# Patient Record
Sex: MALE | Race: WHITE | NOT HISPANIC OR LATINO
[De-identification: names, ages, dates, MRNs, and addresses within clinical notes are randomized per-mention and may not be internally consistent; named-entity substitution may affect disease eponyms.]

---

## 2004-10-01 RX ADMIN — Medication 300 MILLIGRAM(S): at 17:07

## 2017-08-31 ENCOUNTER — APPOINTMENT (OUTPATIENT)
Dept: INTERNAL MEDICINE | Facility: CLINIC | Age: 62
End: 2017-08-31
Payer: COMMERCIAL

## 2017-08-31 VITALS
HEART RATE: 78 BPM | RESPIRATION RATE: 15 BRPM | DIASTOLIC BLOOD PRESSURE: 78 MMHG | HEIGHT: 69 IN | SYSTOLIC BLOOD PRESSURE: 138 MMHG | WEIGHT: 209 LBS | BODY MASS INDEX: 30.96 KG/M2

## 2017-08-31 VITALS — TEMPERATURE: 98.1 F

## 2017-08-31 PROCEDURE — 99215 OFFICE O/P EST HI 40 MIN: CPT

## 2017-10-04 ENCOUNTER — APPOINTMENT (OUTPATIENT)
Dept: CARDIOLOGY | Facility: CLINIC | Age: 62
End: 2017-10-04

## 2017-10-30 ENCOUNTER — APPOINTMENT (OUTPATIENT)
Dept: CARDIOLOGY | Facility: CLINIC | Age: 62
End: 2017-10-30

## 2017-11-08 ENCOUNTER — APPOINTMENT (OUTPATIENT)
Dept: INTERNAL MEDICINE | Facility: CLINIC | Age: 62
End: 2017-11-08
Payer: COMMERCIAL

## 2017-11-08 VITALS
HEART RATE: 80 BPM | HEIGHT: 69 IN | SYSTOLIC BLOOD PRESSURE: 134 MMHG | RESPIRATION RATE: 14 BRPM | DIASTOLIC BLOOD PRESSURE: 74 MMHG | WEIGHT: 220 LBS | BODY MASS INDEX: 32.58 KG/M2

## 2017-11-08 PROCEDURE — 99215 OFFICE O/P EST HI 40 MIN: CPT

## 2017-11-09 ENCOUNTER — LABORATORY RESULT (OUTPATIENT)
Age: 62
End: 2017-11-09

## 2017-11-10 ENCOUNTER — LABORATORY RESULT (OUTPATIENT)
Age: 62
End: 2017-11-10

## 2017-11-13 ENCOUNTER — APPOINTMENT (OUTPATIENT)
Dept: SURGERY | Facility: CLINIC | Age: 62
End: 2017-11-13
Payer: COMMERCIAL

## 2017-11-13 VITALS — WEIGHT: 220 LBS | BODY MASS INDEX: 32.58 KG/M2 | HEIGHT: 69 IN

## 2017-11-13 LAB
25(OH)D3 SERPL-MCNC: 16.1 NG/ML
ALBUMIN SERPL ELPH-MCNC: 4.5 G/DL
ALP BLD-CCNC: 78 U/L
ALT SERPL-CCNC: 48 U/L
ANION GAP SERPL CALC-SCNC: 13 MMOL/L
APPEARANCE: CLEAR
AST SERPL-CCNC: 29 U/L
BASOPHILS # BLD AUTO: 0.02 K/UL
BASOPHILS NFR BLD AUTO: 0.3 %
BILIRUB SERPL-MCNC: 3.9 MG/DL
BILIRUBIN URINE: NEGATIVE
BLOOD URINE: NEGATIVE
BUN SERPL-MCNC: 17 MG/DL
CALCIUM SERPL-MCNC: 9.6 MG/DL
CHLORIDE SERPL-SCNC: 102 MMOL/L
CHOLEST SERPL-MCNC: 210 MG/DL
CHOLEST/HDLC SERPL: 4.7 RATIO
CO2 SERPL-SCNC: 28 MMOL/L
COLOR: YELLOW
CREAT SERPL-MCNC: 1.17 MG/DL
CRP SERPL HS-MCNC: 0.5 MG/L
EOSINOPHIL # BLD AUTO: 0.26 K/UL
EOSINOPHIL NFR BLD AUTO: 3.4 %
GLUCOSE BS SERPL-MCNC: 86 MG/DL
GLUCOSE QUALITATIVE U: 100 MG/DL
GLUCOSE SERPL-MCNC: 98 MG/DL
HBA1C MFR BLD HPLC: 4.8 %
HCT VFR BLD CALC: 46.4 %
HDLC SERPL-MCNC: 45 MG/DL
HGB BLD-MCNC: 16.6 G/DL
IMM GRANULOCYTES NFR BLD AUTO: 0.1 %
KETONES URINE: NEGATIVE
LDLC SERPL CALC-MCNC: 145 MG/DL
LEUKOCYTE ESTERASE URINE: NEGATIVE
LYMPHOCYTES # BLD AUTO: 1.68 K/UL
LYMPHOCYTES NFR BLD AUTO: 22.2 %
MAN DIFF?: NORMAL
MCHC RBC-ENTMCNC: 33.1 PG
MCHC RBC-ENTMCNC: 35.8 GM/DL
MCV RBC AUTO: 92.4 FL
MONOCYTES # BLD AUTO: 0.58 K/UL
MONOCYTES NFR BLD AUTO: 7.7 %
NEUTROPHILS # BLD AUTO: 5.03 K/UL
NEUTROPHILS NFR BLD AUTO: 66.3 %
NITRITE URINE: NEGATIVE
PH URINE: 5.5
PLATELET # BLD AUTO: 256 K/UL
POTASSIUM SERPL-SCNC: 4.4 MMOL/L
PROT SERPL-MCNC: 7 G/DL
PROTEIN URINE: NEGATIVE MG/DL
PSA FREE FLD-MCNC: 42.1
PSA FREE SERPL-MCNC: 0.38 NG/ML
PSA SERPL-MCNC: 0.9 NG/ML
RBC # BLD: 5.02 M/UL
RBC # FLD: 13.8 %
SODIUM SERPL-SCNC: 143 MMOL/L
SPECIFIC GRAVITY URINE: 1.02
T4 FREE SERPL-MCNC: 1.2 NG/DL
TRIGL SERPL-MCNC: 98 MG/DL
TSH SERPL-ACNC: 2.77 UIU/ML
UROBILINOGEN URINE: 1 MG/DL
VIT B12 SERPL-MCNC: 409 PG/ML
WBC # FLD AUTO: 7.58 K/UL

## 2017-11-13 PROCEDURE — 99203 OFFICE O/P NEW LOW 30 MIN: CPT

## 2017-11-17 ENCOUNTER — OUTPATIENT (OUTPATIENT)
Dept: OUTPATIENT SERVICES | Facility: HOSPITAL | Age: 62
LOS: 1 days | End: 2017-11-17
Payer: COMMERCIAL

## 2017-11-17 ENCOUNTER — APPOINTMENT (OUTPATIENT)
Dept: CT IMAGING | Facility: HOSPITAL | Age: 62
End: 2017-11-17
Payer: COMMERCIAL

## 2017-11-17 PROCEDURE — 74178 CT ABD&PLV WO CNTR FLWD CNTR: CPT

## 2017-11-17 PROCEDURE — 74178 CT ABD&PLV WO CNTR FLWD CNTR: CPT | Mod: 26

## 2017-11-29 DIAGNOSIS — Z85.51 PERSONAL HISTORY OF MALIGNANT NEOPLASM OF BLADDER: ICD-10-CM

## 2017-12-05 ENCOUNTER — OUTPATIENT (OUTPATIENT)
Dept: OUTPATIENT SERVICES | Facility: HOSPITAL | Age: 62
LOS: 1 days | End: 2017-12-05
Payer: COMMERCIAL

## 2017-12-05 DIAGNOSIS — Z01.818 ENCOUNTER FOR OTHER PREPROCEDURAL EXAMINATION: ICD-10-CM

## 2017-12-05 DIAGNOSIS — K42.9 UMBILICAL HERNIA WITHOUT OBSTRUCTION OR GANGRENE: ICD-10-CM

## 2017-12-05 DIAGNOSIS — K40.90 UNILATERAL INGUINAL HERNIA, WITHOUT OBSTRUCTION OR GANGRENE, NOT SPECIFIED AS RECURRENT: ICD-10-CM

## 2017-12-05 PROCEDURE — 80048 BASIC METABOLIC PNL TOTAL CA: CPT

## 2017-12-05 PROCEDURE — 85027 COMPLETE CBC AUTOMATED: CPT

## 2017-12-05 PROCEDURE — 93010 ELECTROCARDIOGRAM REPORT: CPT | Mod: NC

## 2017-12-05 PROCEDURE — G0463: CPT

## 2017-12-05 PROCEDURE — 93005 ELECTROCARDIOGRAM TRACING: CPT

## 2017-12-05 PROCEDURE — 36415 COLL VENOUS BLD VENIPUNCTURE: CPT

## 2017-12-11 ENCOUNTER — APPOINTMENT (OUTPATIENT)
Dept: INTERNAL MEDICINE | Facility: CLINIC | Age: 62
End: 2017-12-11
Payer: COMMERCIAL

## 2017-12-11 VITALS
RESPIRATION RATE: 15 BRPM | WEIGHT: 220 LBS | HEART RATE: 76 BPM | HEIGHT: 69 IN | DIASTOLIC BLOOD PRESSURE: 76 MMHG | SYSTOLIC BLOOD PRESSURE: 132 MMHG | BODY MASS INDEX: 32.58 KG/M2

## 2017-12-11 VITALS — SYSTOLIC BLOOD PRESSURE: 164 MMHG | DIASTOLIC BLOOD PRESSURE: 90 MMHG

## 2017-12-11 DIAGNOSIS — Z78.9 OTHER SPECIFIED HEALTH STATUS: ICD-10-CM

## 2017-12-11 DIAGNOSIS — Z83.49 FAMILY HISTORY OF OTHER ENDOCRINE, NUTRITIONAL AND METABOLIC DISEASES: ICD-10-CM

## 2017-12-11 DIAGNOSIS — Z83.42 FAMILY HISTORY OF FAMILIAL HYPERCHOLESTEROLEMIA: ICD-10-CM

## 2017-12-11 DIAGNOSIS — Z63.4 DISAPPEARANCE AND DEATH OF FAMILY MEMBER: ICD-10-CM

## 2017-12-11 PROCEDURE — 99245 OFF/OP CONSLTJ NEW/EST HI 55: CPT

## 2017-12-11 RX ORDER — AZITHROMYCIN 250 MG/1
250 TABLET, FILM COATED ORAL
Qty: 6 | Refills: 1 | Status: DISCONTINUED | COMMUNITY
Start: 2017-08-31 | End: 2017-12-11

## 2017-12-11 RX ORDER — PREDNISONE 10 MG/1
10 TABLET ORAL TWICE DAILY
Qty: 60 | Refills: 0 | Status: DISCONTINUED | COMMUNITY
Start: 2017-10-02 | End: 2017-12-11

## 2017-12-11 RX ORDER — FLUTICASONE PROPIONATE 50 UG/1
50 SPRAY, METERED NASAL DAILY
Qty: 1 | Refills: 3 | Status: DISCONTINUED | COMMUNITY
Start: 2017-08-31 | End: 2017-12-11

## 2017-12-11 SDOH — SOCIAL STABILITY - SOCIAL INSECURITY: DISSAPEARANCE AND DEATH OF FAMILY MEMBER: Z63.4

## 2017-12-12 ENCOUNTER — APPOINTMENT (OUTPATIENT)
Dept: CARDIOLOGY | Facility: CLINIC | Age: 62
End: 2017-12-12
Payer: COMMERCIAL

## 2017-12-12 ENCOUNTER — NON-APPOINTMENT (OUTPATIENT)
Age: 62
End: 2017-12-12

## 2017-12-12 VITALS
SYSTOLIC BLOOD PRESSURE: 167 MMHG | DIASTOLIC BLOOD PRESSURE: 97 MMHG | WEIGHT: 225 LBS | BODY MASS INDEX: 33.33 KG/M2 | HEIGHT: 69 IN | OXYGEN SATURATION: 98 % | RESPIRATION RATE: 17 BRPM | HEART RATE: 69 BPM

## 2017-12-12 DIAGNOSIS — R07.89 OTHER CHEST PAIN: ICD-10-CM

## 2017-12-12 PROCEDURE — 93000 ELECTROCARDIOGRAM COMPLETE: CPT

## 2017-12-12 PROCEDURE — 99244 OFF/OP CNSLTJ NEW/EST MOD 40: CPT

## 2017-12-18 ENCOUNTER — APPOINTMENT (OUTPATIENT)
Dept: CARDIOLOGY | Facility: CLINIC | Age: 62
End: 2017-12-18
Payer: COMMERCIAL

## 2017-12-18 PROCEDURE — 93306 TTE W/DOPPLER COMPLETE: CPT

## 2017-12-21 ENCOUNTER — APPOINTMENT (OUTPATIENT)
Dept: INTERNAL MEDICINE | Facility: CLINIC | Age: 62
End: 2017-12-21

## 2018-01-02 ENCOUNTER — APPOINTMENT (OUTPATIENT)
Dept: CARDIOLOGY | Facility: CLINIC | Age: 63
End: 2018-01-02
Payer: COMMERCIAL

## 2018-01-02 PROCEDURE — 93015 CV STRESS TEST SUPVJ I&R: CPT

## 2018-01-02 PROCEDURE — 78452 HT MUSCLE IMAGE SPECT MULT: CPT

## 2018-01-02 PROCEDURE — A9500: CPT

## 2018-01-19 ENCOUNTER — APPOINTMENT (OUTPATIENT)
Dept: SURGERY | Facility: CLINIC | Age: 63
End: 2018-01-19
Payer: COMMERCIAL

## 2018-01-19 DIAGNOSIS — D41.4 NEOPLASM OF UNCERTAIN BEHAVIOR OF BLADDER: ICD-10-CM

## 2018-01-19 DIAGNOSIS — Z86.79 PERSONAL HISTORY OF OTHER DISEASES OF THE CIRCULATORY SYSTEM: ICD-10-CM

## 2018-01-19 DIAGNOSIS — Z82.49 FAMILY HISTORY OF ISCHEMIC HEART DISEASE AND OTHER DISEASES OF THE CIRCULATORY SYSTEM: ICD-10-CM

## 2018-01-19 DIAGNOSIS — Z80.9 FAMILY HISTORY OF MALIGNANT NEOPLASM, UNSPECIFIED: ICD-10-CM

## 2018-01-19 PROCEDURE — 99214 OFFICE O/P EST MOD 30 MIN: CPT

## 2018-01-30 ENCOUNTER — OUTPATIENT (OUTPATIENT)
Dept: OUTPATIENT SERVICES | Facility: HOSPITAL | Age: 63
LOS: 1 days | End: 2018-01-30
Payer: COMMERCIAL

## 2018-01-30 DIAGNOSIS — I10 ESSENTIAL (PRIMARY) HYPERTENSION: ICD-10-CM

## 2018-01-30 DIAGNOSIS — K42.9 UMBILICAL HERNIA WITHOUT OBSTRUCTION OR GANGRENE: ICD-10-CM

## 2018-01-30 DIAGNOSIS — Z01.818 ENCOUNTER FOR OTHER PREPROCEDURAL EXAMINATION: ICD-10-CM

## 2018-01-30 DIAGNOSIS — K40.90 UNILATERAL INGUINAL HERNIA, WITHOUT OBSTRUCTION OR GANGRENE, NOT SPECIFIED AS RECURRENT: ICD-10-CM

## 2018-01-30 PROCEDURE — 93005 ELECTROCARDIOGRAM TRACING: CPT

## 2018-01-30 PROCEDURE — 80048 BASIC METABOLIC PNL TOTAL CA: CPT

## 2018-01-30 PROCEDURE — G0463: CPT

## 2018-01-30 PROCEDURE — 93010 ELECTROCARDIOGRAM REPORT: CPT | Mod: NC

## 2018-01-30 PROCEDURE — 85027 COMPLETE CBC AUTOMATED: CPT

## 2018-01-30 PROCEDURE — 36415 COLL VENOUS BLD VENIPUNCTURE: CPT

## 2018-02-01 ENCOUNTER — APPOINTMENT (OUTPATIENT)
Dept: CARDIOLOGY | Facility: CLINIC | Age: 63
End: 2018-02-01
Payer: COMMERCIAL

## 2018-02-01 VITALS
DIASTOLIC BLOOD PRESSURE: 80 MMHG | SYSTOLIC BLOOD PRESSURE: 127 MMHG | HEART RATE: 100 BPM | HEIGHT: 69 IN | RESPIRATION RATE: 15 BRPM | OXYGEN SATURATION: 99 % | WEIGHT: 223 LBS | BODY MASS INDEX: 33.03 KG/M2

## 2018-02-01 VITALS — DIASTOLIC BLOOD PRESSURE: 80 MMHG | SYSTOLIC BLOOD PRESSURE: 118 MMHG | HEART RATE: 84 BPM

## 2018-02-01 PROCEDURE — 93000 ELECTROCARDIOGRAM COMPLETE: CPT

## 2018-02-01 PROCEDURE — 99214 OFFICE O/P EST MOD 30 MIN: CPT

## 2018-02-02 ENCOUNTER — APPOINTMENT (OUTPATIENT)
Dept: CARDIOLOGY | Facility: CLINIC | Age: 63
End: 2018-02-02

## 2018-02-06 ENCOUNTER — APPOINTMENT (OUTPATIENT)
Dept: INTERNAL MEDICINE | Facility: CLINIC | Age: 63
End: 2018-02-06
Payer: COMMERCIAL

## 2018-02-06 VITALS — WEIGHT: 225 LBS | BODY MASS INDEX: 33.33 KG/M2 | HEIGHT: 69 IN

## 2018-02-06 PROCEDURE — 99245 OFF/OP CONSLTJ NEW/EST HI 55: CPT

## 2018-02-08 ENCOUNTER — TRANSCRIPTION ENCOUNTER (OUTPATIENT)
Age: 63
End: 2018-02-08

## 2018-02-08 ENCOUNTER — OUTPATIENT (OUTPATIENT)
Dept: OUTPATIENT SERVICES | Facility: HOSPITAL | Age: 63
LOS: 1 days | End: 2018-02-08
Payer: COMMERCIAL

## 2018-02-08 DIAGNOSIS — K40.90 UNILATERAL INGUINAL HERNIA, WITHOUT OBSTRUCTION OR GANGRENE, NOT SPECIFIED AS RECURRENT: ICD-10-CM

## 2018-02-08 DIAGNOSIS — K42.9 UMBILICAL HERNIA WITHOUT OBSTRUCTION OR GANGRENE: ICD-10-CM

## 2018-02-08 PROCEDURE — 49585: CPT | Mod: 59

## 2018-02-08 PROCEDURE — 49585: CPT

## 2018-02-08 PROCEDURE — 49505 PRP I/HERN INIT REDUC >5 YR: CPT | Mod: RT

## 2018-02-08 PROCEDURE — 49505 PRP I/HERN INIT REDUC >5 YR: CPT

## 2018-02-08 PROCEDURE — C1781: CPT

## 2018-02-23 ENCOUNTER — APPOINTMENT (OUTPATIENT)
Dept: SURGERY | Facility: CLINIC | Age: 63
End: 2018-02-23
Payer: COMMERCIAL

## 2018-02-23 DIAGNOSIS — K42.9 UMBILICAL HERNIA W/OUT OBSTRUCTION OR GANGRENE: ICD-10-CM

## 2018-02-23 DIAGNOSIS — K40.90 UNILATERAL INGUINAL HERNIA, W/OUT OBSTRUCTION OR GANGRENE, NOT SPECIFIED AS RECURRENT: ICD-10-CM

## 2018-02-23 PROCEDURE — 99024 POSTOP FOLLOW-UP VISIT: CPT

## 2018-06-02 ENCOUNTER — RX RENEWAL (OUTPATIENT)
Age: 63
End: 2018-06-02

## 2018-12-28 ENCOUNTER — LABORATORY RESULT (OUTPATIENT)
Age: 63
End: 2018-12-28

## 2018-12-28 ENCOUNTER — EMERGENCY (EMERGENCY)
Facility: HOSPITAL | Age: 63
LOS: 1 days | Discharge: HOME | End: 2018-12-28
Attending: EMERGENCY MEDICINE | Admitting: EMERGENCY MEDICINE
Payer: COMMERCIAL

## 2018-12-28 ENCOUNTER — APPOINTMENT (OUTPATIENT)
Dept: INTERNAL MEDICINE | Facility: CLINIC | Age: 63
End: 2018-12-28
Payer: COMMERCIAL

## 2018-12-28 VITALS — DIASTOLIC BLOOD PRESSURE: 92 MMHG | RESPIRATION RATE: 18 BRPM | SYSTOLIC BLOOD PRESSURE: 192 MMHG | HEART RATE: 84 BPM

## 2018-12-28 VITALS
RESPIRATION RATE: 18 BRPM | TEMPERATURE: 98 F | DIASTOLIC BLOOD PRESSURE: 116 MMHG | HEART RATE: 89 BPM | HEIGHT: 69 IN | WEIGHT: 227.96 LBS | SYSTOLIC BLOOD PRESSURE: 192 MMHG | OXYGEN SATURATION: 93 %

## 2018-12-28 VITALS
HEIGHT: 69 IN | SYSTOLIC BLOOD PRESSURE: 200 MMHG | BODY MASS INDEX: 33.77 KG/M2 | WEIGHT: 228 LBS | HEART RATE: 90 BPM | TEMPERATURE: 97.8 F | RESPIRATION RATE: 18 BRPM | DIASTOLIC BLOOD PRESSURE: 100 MMHG | OXYGEN SATURATION: 96 %

## 2018-12-28 VITALS — SYSTOLIC BLOOD PRESSURE: 190 MMHG | DIASTOLIC BLOOD PRESSURE: 100 MMHG

## 2018-12-28 VITALS — DIASTOLIC BLOOD PRESSURE: 94 MMHG | SYSTOLIC BLOOD PRESSURE: 182 MMHG

## 2018-12-28 LAB
25(OH)D3 SERPL-MCNC: 11.2 NG/ML
ALBUMIN SERPL ELPH-MCNC: 4.5 G/DL
ALP BLD-CCNC: 92 U/L
ALT SERPL-CCNC: 57 U/L
ANION GAP SERPL CALC-SCNC: 15 MMOL/L
ANION GAP SERPL CALC-SCNC: 8 MMOL/L — SIGNIFICANT CHANGE UP (ref 5–17)
APPEARANCE UR: CLEAR — SIGNIFICANT CHANGE UP
APPEARANCE: CLEAR
AST SERPL-CCNC: 33 U/L
BACTERIA # UR AUTO: ABNORMAL /HPF
BASOPHILS # BLD AUTO: 0.03 K/UL
BASOPHILS NFR BLD AUTO: 0.5 %
BILIRUB SERPL-MCNC: 1.6 MG/DL
BILIRUB UR-MCNC: ABNORMAL
BILIRUBIN URINE: NEGATIVE
BLOOD URINE: ABNORMAL
BUN SERPL-MCNC: 16 MG/DL
BUN SERPL-MCNC: 18 MG/DL — SIGNIFICANT CHANGE UP (ref 7–23)
CALCIUM SERPL-MCNC: 9.3 MG/DL — SIGNIFICANT CHANGE UP (ref 8.4–10.5)
CALCIUM SERPL-MCNC: 9.6 MG/DL
CHLORIDE SERPL-SCNC: 100 MMOL/L
CHLORIDE SERPL-SCNC: 104 MMOL/L — SIGNIFICANT CHANGE UP (ref 96–108)
CHOLEST SERPL-MCNC: 203 MG/DL
CHOLEST/HDLC SERPL: 4.6 RATIO
CO2 SERPL-SCNC: 26 MMOL/L
CO2 SERPL-SCNC: 29 MMOL/L — SIGNIFICANT CHANGE UP (ref 22–31)
COLOR SPEC: YELLOW — SIGNIFICANT CHANGE UP
COLOR: ABNORMAL
CREAT SERPL-MCNC: 1.11 MG/DL
CREAT SERPL-MCNC: 1.18 MG/DL — SIGNIFICANT CHANGE UP (ref 0.5–1.3)
CRP SERPL HS-MCNC: 0.4 MG/L
DIFF PNL FLD: ABNORMAL
EOSINOPHIL # BLD AUTO: 0.21 K/UL
EOSINOPHIL NFR BLD AUTO: 3.3 %
EPI CELLS # UR: SIGNIFICANT CHANGE UP
GLUCOSE BS SERPL-MCNC: 109 MG/DL
GLUCOSE QUALITATIVE U: 250 MG/DL
GLUCOSE SERPL-MCNC: 106 MG/DL
GLUCOSE SERPL-MCNC: 108 MG/DL — HIGH (ref 70–99)
GLUCOSE UR QL: 50 MG/DL
HBA1C MFR BLD HPLC: 5 %
HCT VFR BLD CALC: 43.8 % — SIGNIFICANT CHANGE UP (ref 39–50)
HCT VFR BLD CALC: 44.5 %
HDLC SERPL-MCNC: 44 MG/DL
HGB BLD-MCNC: 15.8 G/DL
HGB BLD-MCNC: 16.1 G/DL — SIGNIFICANT CHANGE UP (ref 13–17)
IMM GRANULOCYTES NFR BLD AUTO: 0.2 %
KETONES UR-MCNC: NEGATIVE — SIGNIFICANT CHANGE UP
KETONES URINE: NEGATIVE
LDLC SERPL CALC-MCNC: 139 MG/DL
LEUKOCYTE ESTERASE UR-ACNC: ABNORMAL
LEUKOCYTE ESTERASE URINE: NEGATIVE
LYMPHOCYTES # BLD AUTO: 1.59 K/UL
LYMPHOCYTES NFR BLD AUTO: 24.8 %
MAN DIFF?: NORMAL
MCHC RBC-ENTMCNC: 31.7 PG
MCHC RBC-ENTMCNC: 33.1 PG — SIGNIFICANT CHANGE UP (ref 27–34)
MCHC RBC-ENTMCNC: 35.5 GM/DL
MCHC RBC-ENTMCNC: 36.8 GM/DL — HIGH (ref 32–36)
MCV RBC AUTO: 89.4 FL
MCV RBC AUTO: 89.8 FL — SIGNIFICANT CHANGE UP (ref 80–100)
MONOCYTES # BLD AUTO: 0.53 K/UL
MONOCYTES NFR BLD AUTO: 8.3 %
NEUTROPHILS # BLD AUTO: 4.03 K/UL
NEUTROPHILS NFR BLD AUTO: 62.9 %
NITRITE UR-MCNC: NEGATIVE — SIGNIFICANT CHANGE UP
NITRITE URINE: NEGATIVE
PH UR: 6.5 — SIGNIFICANT CHANGE UP (ref 5–8)
PH URINE: 5.5
PLATELET # BLD AUTO: 231 K/UL
PLATELET # BLD AUTO: 258 K/UL — SIGNIFICANT CHANGE UP (ref 150–400)
POTASSIUM SERPL-MCNC: 3.5 MMOL/L — SIGNIFICANT CHANGE UP (ref 3.5–5.3)
POTASSIUM SERPL-SCNC: 3.5 MMOL/L — SIGNIFICANT CHANGE UP (ref 3.5–5.3)
POTASSIUM SERPL-SCNC: 3.9 MMOL/L
PROT SERPL-MCNC: 6.8 G/DL
PROT UR-MCNC: 30 MG/DL
PROTEIN URINE: 30 MG/DL
PSA FREE FLD-MCNC: 44 %
PSA FREE SERPL-MCNC: 0.31 NG/ML
PSA SERPL-MCNC: 0.71 NG/ML
RBC # BLD: 4.88 M/UL — SIGNIFICANT CHANGE UP (ref 4.2–5.8)
RBC # BLD: 4.98 M/UL
RBC # FLD: 11.1 % — SIGNIFICANT CHANGE UP (ref 10.3–14.5)
RBC # FLD: 13.1 %
RBC CASTS # UR COMP ASSIST: NEGATIVE /HPF — SIGNIFICANT CHANGE UP (ref 0–4)
SODIUM SERPL-SCNC: 141 MMOL/L
SODIUM SERPL-SCNC: 141 MMOL/L — SIGNIFICANT CHANGE UP (ref 135–145)
SP GR SPEC: 1.02 — SIGNIFICANT CHANGE UP (ref 1.01–1.02)
SPECIFIC GRAVITY URINE: 1.03
T4 FREE SERPL-MCNC: 1.2 NG/DL
TRIGL SERPL-MCNC: 101 MG/DL
TROPONIN I SERPL-MCNC: 0.03 NG/ML — SIGNIFICANT CHANGE UP (ref 0.02–0.06)
TSH SERPL-ACNC: 2.94 UIU/ML
UROBILINOGEN FLD QL: 1
UROBILINOGEN URINE: 1 MG/DL
VIT B12 SERPL-MCNC: 468 PG/ML
WBC # BLD: 7.9 K/UL — SIGNIFICANT CHANGE UP (ref 3.8–10.5)
WBC # FLD AUTO: 6.4 K/UL
WBC # FLD AUTO: 7.9 K/UL — SIGNIFICANT CHANGE UP (ref 3.8–10.5)
WBC UR QL: NEGATIVE /HPF — SIGNIFICANT CHANGE UP (ref 0–5)

## 2018-12-28 PROCEDURE — 84484 ASSAY OF TROPONIN QUANT: CPT

## 2018-12-28 PROCEDURE — 93005 ELECTROCARDIOGRAM TRACING: CPT

## 2018-12-28 PROCEDURE — 99284 EMERGENCY DEPT VISIT MOD MDM: CPT

## 2018-12-28 PROCEDURE — 70450 CT HEAD/BRAIN W/O DYE: CPT | Mod: 26

## 2018-12-28 PROCEDURE — 85027 COMPLETE CBC AUTOMATED: CPT

## 2018-12-28 PROCEDURE — 81001 URINALYSIS AUTO W/SCOPE: CPT

## 2018-12-28 PROCEDURE — 93010 ELECTROCARDIOGRAM REPORT: CPT

## 2018-12-28 PROCEDURE — 99284 EMERGENCY DEPT VISIT MOD MDM: CPT | Mod: 25

## 2018-12-28 PROCEDURE — 80048 BASIC METABOLIC PNL TOTAL CA: CPT

## 2018-12-28 PROCEDURE — 99215 OFFICE O/P EST HI 40 MIN: CPT

## 2018-12-28 PROCEDURE — 70450 CT HEAD/BRAIN W/O DYE: CPT

## 2018-12-28 RX ORDER — AMLODIPINE BESYLATE 2.5 MG/1
1 TABLET ORAL
Qty: 14 | Refills: 0
Start: 2018-12-28 | End: 2019-01-10

## 2018-12-28 RX ORDER — OXYCODONE AND ACETAMINOPHEN 5; 325 MG/1; MG/1
5-325 TABLET ORAL
Qty: 25 | Refills: 0 | Status: DISCONTINUED | COMMUNITY
Start: 2018-02-08 | End: 2018-12-28

## 2018-12-28 RX ORDER — LISINOPRIL 2.5 MG/1
20 TABLET ORAL ONCE
Qty: 0 | Refills: 0 | Status: COMPLETED | OUTPATIENT
Start: 2018-12-28 | End: 2018-12-28

## 2018-12-28 RX ORDER — AMLODIPINE BESYLATE 2.5 MG/1
10 TABLET ORAL ONCE
Qty: 0 | Refills: 0 | Status: COMPLETED | OUTPATIENT
Start: 2018-12-28 | End: 2018-12-28

## 2018-12-28 RX ADMIN — AMLODIPINE BESYLATE 10 MILLIGRAM(S): 2.5 TABLET ORAL at 16:56

## 2018-12-28 RX ADMIN — LISINOPRIL 20 MILLIGRAM(S): 2.5 TABLET ORAL at 14:35

## 2018-12-28 NOTE — ED ADULT NURSE NOTE - OBJECTIVE STATEMENT
63 yr old male to ED A&Ox3 presents with +headaches, dizziness, and fatigue. Pt has a a hx-HTN. Pt reports being non=compliant with Lisinopril. Pt does not remember the last time that he took his medication. Pt states a 2/10 headache at this time.  Pt went to PMD and sent here for further evaluation.  No acute resp distress noted. Resp even and unlabored. Abd soft, NT, ND. +BS. +PP. ARCHULETA. Skin warm, dry and intact. Pt placed on tele monitor- Sinus rhythm noted @87 bpm. B/p-196/101.  Denies chest pain or SOB. Denies numbness or tingling to ext. Will continue to monitor. Safety maintained.

## 2018-12-28 NOTE — REVIEW OF SYSTEMS
[Fatigue] : fatigue [Headache] : headache [Fever] : no fever [Chills] : no chills [Hot Flashes] : no hot flashes [Night Sweats] : no night sweats [Recent Change In Weight] : ~T no recent weight change [Discharge] : no discharge [Pain] : no pain [Redness] : no redness [Dryness] : no dryness [Vision Problems] : no vision problems [Itching] : no itching [Earache] : no earache [Hearing Loss] : no hearing loss [Nosebleeds] : no nosebleeds [Postnasal Drip] : no postnasal drip [Nasal Discharge] : no nasal discharge [Sore Throat] : no sore throat [Hoarseness] : no hoarseness [Chest Pain] : no chest pain [Palpitations] : no palpitations [Claudication] : no  leg claudication [Lower Ext Edema] : no lower extremity edema [Orthopena] : no orthopnea [Paroysmal Nocturnal Dyspnea] : no paroysmal nocturnal dyspnea [Shortness Of Breath] : no shortness of breath [Wheezing] : no wheezing [Cough] : no cough [Dyspnea on Exertion] : not dyspnea on exertion [Abdominal Pain] : no abdominal pain [Nausea] : no nausea [Constipation] : no constipation [Diarrhea] : no diarrhea [Vomiting] : no vomiting [Heartburn] : no heartburn [Melena] : no melena [Dysuria] : no dysuria [Incontinence] : no incontinence [Hesitancy] : no hesitancy [Nocturia] : no nocturia [Hematuria] : no hematuria [Frequency] : no frequency [Impotence] : no impotency [Poor Libido] : libido not poor [Joint Pain] : no joint pain [Joint Stiffness] : no joint stiffness [Muscle Pain] : no muscle pain [Muscle Weakness] : no muscle weakness [Back Pain] : no back pain [Joint Swelling] : no joint swelling [Itching] : no itching [Mole Changes] : no mole changes [Nail Changes] : no nail changes [Hair Changes] : no hair changes [Skin Rash] : no skin rash [Dizziness] : no dizziness [Fainting] : no fainting [Confusion] : no confusion [Unsteady Walk] : no ataxia [Memory Loss] : no memory loss [Suicidal] : not suicidal [Insomnia] : no insomnia [Anxiety] : no anxiety [Depression] : no depression [Easy Bleeding] : no easy bleeding [Easy Bruising] : no easy bruising [Swollen Glands] : no swollen glands [FreeTextEntry4] : snoring

## 2018-12-28 NOTE — HEALTH RISK ASSESSMENT
[No falls in past year] : Patient reported no falls in the past year [0] : 2) Feeling down, depressed, or hopeless: Not at all (0) [[id number]] : 0

## 2018-12-28 NOTE — ED PROVIDER NOTE - MEDICAL DECISION MAKING DETAILS
imp- asymptomatic hypertension   plan- labs reviewed, head CT negative. Patient given lisinopril 20mg, BP still mildly elevated, spoke with Dr. Jones, will start norvasc 10mg and send UA, he will f/u UA results

## 2018-12-28 NOTE — ED PROVIDER NOTE - OBJECTIVE STATEMENT
62 y/o M with PMH of HTN (non compliant with meds x months) was sent to the ED by Dr. Kyle for elevated BP. Patient reports intermittent HA x 1 year and lightheadedness x 2 weeks, which promoted him to see Dr. Kyle, his BP was 200's/100's in the office. Patient reports his takes ~1200mg of advil daily x months for his headache. He currently denies HA or dizziness, CP, SOB, palpitations, n/v, f/c, abd pain, 62 y/o M with PMH of HTN (non compliant with meds x months) was sent to the ED by Dr. Kyle for elevated BP. Patient reports intermittent HA x 1 year and lightheadedness x 2 weeks, which promoted him to see Dr. Kyle, his BP was 200's/100's in the office. Patient reports his takes ~1200mg of advil daily x months for his headache. He currently denies HA or dizziness, CP, SOB, palpitations, n/v, f/c, abd pain, urinary symptoms, neuro symptoms or all other complaints

## 2018-12-28 NOTE — ASSESSMENT
[FreeTextEntry1] : Physical exam shows a somewhat anxious man in no acute distress his blood pressure when taken by the nurse was 190/100 taken in my room with the patient laying down is 182/94 of note the patient has not taken his lisinopril for several months HEENT was unremarkable chest was clear her cardiovascular exam showed normal S1-S2 no rubs or murmurs abdomen was soft and nontender extremities showed no clubbing cyanosis or edema the neurologic exam was nonfocal patient will go home and take his 20 mg of lisinopril return to the office in 2 hours if blood pressure still over 180 systolic he emergency room for management of blood tests will be obtained patient is is advised to seek an ear nose and throat appointment for possible sleep apnea and to see a neurologist for his headaches name is for both of these were given patient claims not to be depressed and is in a stable relationship for one minute after his wife

## 2018-12-28 NOTE — ED PROVIDER NOTE - ATTENDING CONTRIBUTION TO CARE
Pt seen and examined, history taken and chart reviewed. Case discussed with Dr. Kyle and pt's daughter. Pt instructed to stop taking ibuprofen and to be compliant with medications. Pt is awake and alert. lungs cba, cor s1 and s2 abdomen soft, extremities no edema. I agree with acp's plan and management.

## 2018-12-28 NOTE — HISTORY OF PRESENT ILLNESS
[FreeTextEntry1] : Comes to the office for followup to review his medications discuss his overall health recently has felt fatigued and has had a feeling of malaise [de-identified] : 63-year-old man comes to the office for followup and review his medications discuss his overall health patient has not been taking his blood pressure pill for the last several months he has no explanation as to why patient since noticed that he has gained some weight his chronic headaches are worse than usual and that he has been snoring actively patient denies chest pain shortness of breath exertional dyspnea lightheadedness palpitations dizziness vertigo or syncope he denies sinus congestion sore throat cough wheezing, no pain nausea vomiting diarrhea constipation bright red blood per rectum or black stools he has been under some stress recently

## 2019-01-24 PROBLEM — I10 ESSENTIAL (PRIMARY) HYPERTENSION: Chronic | Status: ACTIVE | Noted: 2018-12-28

## 2019-02-07 ENCOUNTER — FORM ENCOUNTER (OUTPATIENT)
Age: 64
End: 2019-02-07

## 2019-02-08 ENCOUNTER — APPOINTMENT (OUTPATIENT)
Dept: ULTRASOUND IMAGING | Facility: CLINIC | Age: 64
End: 2019-02-08
Payer: COMMERCIAL

## 2019-02-08 ENCOUNTER — OUTPATIENT (OUTPATIENT)
Dept: OUTPATIENT SERVICES | Facility: HOSPITAL | Age: 64
LOS: 1 days | End: 2019-02-08
Payer: COMMERCIAL

## 2019-02-08 DIAGNOSIS — Z00.8 ENCOUNTER FOR OTHER GENERAL EXAMINATION: ICD-10-CM

## 2019-02-08 PROCEDURE — 93975 VASCULAR STUDY: CPT | Mod: 26

## 2019-02-08 PROCEDURE — 93975 VASCULAR STUDY: CPT

## 2019-02-12 ENCOUNTER — APPOINTMENT (OUTPATIENT)
Dept: CARDIOLOGY | Facility: CLINIC | Age: 64
End: 2019-02-12
Payer: COMMERCIAL

## 2019-02-12 ENCOUNTER — NON-APPOINTMENT (OUTPATIENT)
Age: 64
End: 2019-02-12

## 2019-02-12 VITALS
OXYGEN SATURATION: 97 % | RESPIRATION RATE: 16 BRPM | WEIGHT: 229 LBS | BODY MASS INDEX: 33.92 KG/M2 | SYSTOLIC BLOOD PRESSURE: 132 MMHG | HEART RATE: 70 BPM | HEIGHT: 69 IN | DIASTOLIC BLOOD PRESSURE: 66 MMHG

## 2019-02-12 VITALS — DIASTOLIC BLOOD PRESSURE: 70 MMHG | SYSTOLIC BLOOD PRESSURE: 98 MMHG | HEART RATE: 72 BPM

## 2019-02-12 VITALS — SYSTOLIC BLOOD PRESSURE: 104 MMHG | DIASTOLIC BLOOD PRESSURE: 70 MMHG

## 2019-02-12 PROCEDURE — 99215 OFFICE O/P EST HI 40 MIN: CPT

## 2019-02-12 PROCEDURE — 93000 ELECTROCARDIOGRAM COMPLETE: CPT

## 2019-02-12 NOTE — ASSESSMENT
[FreeTextEntry1] : Impression:\par 1. Hypertension improved but still not optimally controlled in a patient who is still symptomatic. Based on the fact that his pressure doesn't come down at times he most probably does not have renal artery stenosis but will await final results of renal stone\par \par Plan:\par 1. Due to persistent fatigue and headache, will taper labetalol to off. He will take half a dose for one week and a half the dose every other day for a week and then stop\par 2. Start hydrochlorothiazide 12.5 mg per day\par 3. If after pressure is better controlled patient still has symptoms could attempt to reassess coronary circulation although this is most probably not a cause of his symptoms based on his normal nuclear stress test one year ago

## 2019-02-12 NOTE — PHYSICAL EXAM
[General Appearance - Well Developed] : well developed [Normal Appearance] : normal appearance [Well Groomed] : well groomed [General Appearance - Well Nourished] : well nourished [No Deformities] : no deformities [General Appearance - In No Acute Distress] : no acute distress [Normal Conjunctiva] : the conjunctiva exhibited no abnormalities [Normal Oral Mucosa] : normal oral mucosa [No Oral Pallor] : no oral pallor [Normal Oropharynx] : normal oropharynx [Normal Jugular Venous V Waves Present] : normal jugular venous V waves present [Respiration, Rhythm And Depth] : normal respiratory rhythm and effort [Auscultation Breath Sounds / Voice Sounds] : lungs were clear to auscultation bilaterally [Abdomen Soft] : soft [Abdomen Tenderness] : non-tender [Abnormal Walk] : normal gait [Nail Clubbing] : no clubbing of the fingernails [Cyanosis, Localized] : no localized cyanosis [Skin Color & Pigmentation] : normal skin color and pigmentation [] : no rash [Oriented To Time, Place, And Person] : oriented to person, place, and time [No Anxiety] : not feeling anxious [Normal] : normal [No Precordial Heave] : no precordial heave was noted [Normal Rate] : normal [Rhythm Regular] : regular [Normal S1] : normal S1 [Normal S2] : normal S2 [No Gallop] : no gallop heard [S3] : no S3 [S4] : no S4 [Click] : no click [Pericardial Rub] : no pericardial rub [I] : a grade 1 [2+] : left 2+ [Right Carotid Bruit] : no bruit heard over the right carotid [Left Carotid Bruit] : no bruit heard over the left carotid [No Pitting Edema] : no pitting edema present

## 2019-02-12 NOTE — REASON FOR VISIT
[Follow-Up - Clinic] : a clinic follow-up of [Dyspnea] : dyspnea [Fatigue] : feeling tired (fatigue) [Hypertension] : hypertension [Medication Management] : Medication management [FreeTextEntry1] : Patient returns for reassessment. Apparently following his hernia surgery last year he stopped taking his medication for blood pressure. Approximately several months ago he began having fatigue and headaches and ultimately was found to have a blood pressure of 210/100. He ultimately was placed on a regimen of lisinopril twice a day amlodipine 10 mg a day and labetalol 100 mg twice a day. He brings a blood pressure record with him and his pressure although still optimally controlled is certainly improved. All of his pressures are essentially in the 140-160/ range. He has had a pressure as low as 109/70. He continues however to have fatigue and some shortness of breath when walking up steps. He does work part-time at [employer] and is able to do it but does state he does get very fatigued. He recently went for a renal sonogram the results of which are pending.

## 2019-02-21 ENCOUNTER — RX RENEWAL (OUTPATIENT)
Age: 64
End: 2019-02-21

## 2019-02-26 ENCOUNTER — APPOINTMENT (OUTPATIENT)
Dept: OPHTHALMOLOGY | Facility: CLINIC | Age: 64
End: 2019-02-26
Payer: COMMERCIAL

## 2019-02-26 DIAGNOSIS — H35.372 PUCKERING OF MACULA, LEFT EYE: ICD-10-CM

## 2019-02-26 DIAGNOSIS — H35.63 RETINAL HEMORRHAGE, BILATERAL: ICD-10-CM

## 2019-02-26 PROCEDURE — 92004 COMPRE OPH EXAM NEW PT 1/>: CPT

## 2019-02-26 PROCEDURE — 92134 CPTRZ OPH DX IMG PST SGM RTA: CPT

## 2019-02-26 PROCEDURE — 92225: CPT | Mod: RT

## 2019-02-27 ENCOUNTER — APPOINTMENT (OUTPATIENT)
Dept: CARDIOLOGY | Facility: CLINIC | Age: 64
End: 2019-02-27
Payer: COMMERCIAL

## 2019-02-27 ENCOUNTER — NON-APPOINTMENT (OUTPATIENT)
Age: 64
End: 2019-02-27

## 2019-02-27 VITALS
SYSTOLIC BLOOD PRESSURE: 123 MMHG | DIASTOLIC BLOOD PRESSURE: 77 MMHG | HEIGHT: 69 IN | HEART RATE: 87 BPM | WEIGHT: 226 LBS | BODY MASS INDEX: 33.47 KG/M2 | RESPIRATION RATE: 15 BRPM | OXYGEN SATURATION: 96 %

## 2019-02-27 VITALS — HEIGHT: 69 IN | WEIGHT: 226 LBS | BODY MASS INDEX: 33.47 KG/M2

## 2019-02-27 VITALS — DIASTOLIC BLOOD PRESSURE: 80 MMHG | SYSTOLIC BLOOD PRESSURE: 130 MMHG

## 2019-02-27 VITALS — HEART RATE: 84 BPM | SYSTOLIC BLOOD PRESSURE: 110 MMHG | DIASTOLIC BLOOD PRESSURE: 74 MMHG

## 2019-02-27 PROCEDURE — 93306 TTE W/DOPPLER COMPLETE: CPT

## 2019-02-27 PROCEDURE — 99215 OFFICE O/P EST HI 40 MIN: CPT

## 2019-02-27 PROCEDURE — 93000 ELECTROCARDIOGRAM COMPLETE: CPT

## 2019-02-27 RX ORDER — LABETALOL HYDROCHLORIDE 100 MG/1
100 TABLET, FILM COATED ORAL
Qty: 60 | Refills: 1 | Status: DISCONTINUED | COMMUNITY
Start: 2018-12-31 | End: 2019-02-27

## 2019-02-27 NOTE — ASSESSMENT
[FreeTextEntry1] : The patient was referred to the echocardiography laboratory. 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 Overall left ventricular systolic performance was well-preserved.\par \par In summary, the patient is a 64-year-old man with hypertension and hyperlipidemia who has recently had a retinal artery occlusion. Noninvasive workup in the past has revealed no evidence of significant obstructive coronary disease.\par \par For now I have advised him to be on aspirin 162 mg per day. He will also begin statin therapy with atorvastatin 40 mg per day. He will return for carotid sonography. Consideration could be given to loop recording if carotid sonography is negative.

## 2019-02-27 NOTE — REASON FOR VISIT
[FreeTextEntry2] : assessment of retinal artery occlusion [FreeTextEntry1] : Patient presents for reassessment at the advice of his ophthalmologist due to the fact that he was found to have a retinal artery occlusion. She wishes him to have an echocardiogram and a carotid sonogram. The patient has no new symptomatology. The patient feels better since coming off his beta blocker. He is much more awake and alert and has less shortness of breath. Review of his most recent lipid profile revealed a total cholesterol 203 HDL 44 and .

## 2019-03-05 ENCOUNTER — APPOINTMENT (OUTPATIENT)
Dept: CARDIOLOGY | Facility: CLINIC | Age: 64
End: 2019-03-05
Payer: COMMERCIAL

## 2019-03-05 ENCOUNTER — NON-APPOINTMENT (OUTPATIENT)
Age: 64
End: 2019-03-05

## 2019-03-05 VITALS
OXYGEN SATURATION: 97 % | WEIGHT: 226 LBS | DIASTOLIC BLOOD PRESSURE: 73 MMHG | HEIGHT: 69 IN | RESPIRATION RATE: 16 BRPM | HEART RATE: 93 BPM | BODY MASS INDEX: 33.47 KG/M2 | SYSTOLIC BLOOD PRESSURE: 120 MMHG

## 2019-03-05 VITALS — SYSTOLIC BLOOD PRESSURE: 120 MMHG | HEART RATE: 88 BPM | DIASTOLIC BLOOD PRESSURE: 70 MMHG

## 2019-03-05 PROCEDURE — 93000 ELECTROCARDIOGRAM COMPLETE: CPT

## 2019-03-05 PROCEDURE — 93880 EXTRACRANIAL BILAT STUDY: CPT

## 2019-03-05 PROCEDURE — 99215 OFFICE O/P EST HI 40 MIN: CPT

## 2019-03-05 NOTE — REASON FOR VISIT
[Follow-Up - Clinic] : a clinic follow-up of [FreeTextEntry1] : Patient returns for followup. He is feeling well. He offers no complaints of chest discomfort shortness of breath palpitations dizziness or syncope. He underwent carotid sonography today which was negative.

## 2019-03-05 NOTE — ASSESSMENT
[FreeTextEntry1] : Impression:\par 1. Possible cryptogenic stroke\par 2. Hypertension well controlled\par 3.Hyperlipidemia\par \par Plan:\par 1. Implantable loop recorder to look for underlying atrial fibrillation as a possible etiology of patient's ocular event.\par 2. Will defer SONIA for now as even if it were present would not necessarily assist in management

## 2019-03-19 ENCOUNTER — APPOINTMENT (OUTPATIENT)
Dept: CARDIOLOGY | Facility: CLINIC | Age: 64
End: 2019-03-19

## 2019-03-22 ENCOUNTER — APPOINTMENT (OUTPATIENT)
Dept: INTERNAL MEDICINE | Facility: CLINIC | Age: 64
End: 2019-03-22
Payer: COMMERCIAL

## 2019-03-22 VITALS
TEMPERATURE: 97.9 F | DIASTOLIC BLOOD PRESSURE: 73 MMHG | SYSTOLIC BLOOD PRESSURE: 131 MMHG | RESPIRATION RATE: 14 BRPM | HEART RATE: 84 BPM | OXYGEN SATURATION: 98 %

## 2019-03-22 VITALS — BODY MASS INDEX: 33.67 KG/M2 | WEIGHT: 228 LBS

## 2019-03-22 VITALS — HEIGHT: 69 IN

## 2019-03-22 DIAGNOSIS — R31.9 HEMATURIA, UNSPECIFIED: ICD-10-CM

## 2019-03-22 PROCEDURE — 99215 OFFICE O/P EST HI 40 MIN: CPT

## 2019-03-22 RX ORDER — HYDROCHLOROTHIAZIDE 12.5 MG/1
12.5 CAPSULE ORAL
Refills: 0 | Status: DISCONTINUED | COMMUNITY
Start: 2019-02-12 | End: 2019-03-22

## 2019-03-23 NOTE — HEALTH RISK ASSESSMENT
[No falls in past year] : Patient reported no falls in the past year [0] : 2) Feeling down, depressed, or hopeless: Not at all (0) [[id number]] : 0

## 2019-03-23 NOTE — REVIEW OF SYSTEMS
[Impotence] : impotence [Poor Libido] : poor libido [Postnasal Drip] : postnasal drip [Nocturia] : nocturia [Joint Stiffness] : joint stiffness [Anxiety] : anxiety [Fever] : no fever [Chills] : no chills [Fatigue] : no fatigue [Hot Flashes] : no hot flashes [Night Sweats] : no night sweats [Recent Change In Weight] : ~T no recent weight change [Discharge] : no discharge [Pain] : no pain [Redness] : no redness [Dryness] : no dryness [Vision Problems] : no vision problems [Itching] : no itching [Earache] : no earache [Hearing Loss] : no hearing loss [Nosebleeds] : no nosebleeds [Nasal Discharge] : no nasal discharge [Sore Throat] : no sore throat [Hoarseness] : no hoarseness [Chest Pain] : no chest pain [Palpitations] : no palpitations [Claudication] : no  leg claudication [Lower Ext Edema] : no lower extremity edema [Orthopena] : no orthopnea [Paroysmal Nocturnal Dyspnea] : no paroysmal nocturnal dyspnea [Shortness Of Breath] : no shortness of breath [Wheezing] : no wheezing [Cough] : no cough [Dyspnea on Exertion] : not dyspnea on exertion [Abdominal Pain] : no abdominal pain [Nausea] : no nausea [Constipation] : no constipation [Diarrhea] : no diarrhea [Vomiting] : no vomiting [Heartburn] : no heartburn [Melena] : no melena [Dysuria] : no dysuria [Incontinence] : no incontinence [Hesitancy] : no hesitancy [Hematuria] : no hematuria [Frequency] : no frequency [Joint Pain] : no joint pain [Muscle Pain] : no muscle pain [Muscle Weakness] : no muscle weakness [Back Pain] : no back pain [Joint Swelling] : no joint swelling [Mole Changes] : no mole changes [Nail Changes] : no nail changes [Hair Changes] : no hair changes [Skin Rash] : no skin rash [Headache] : no headache [Dizziness] : no dizziness [Fainting] : no fainting [Confusion] : no confusion [Unsteady Walk] : no ataxia [Memory Loss] : no memory loss [Suicidal] : not suicidal [Insomnia] : no insomnia [Depression] : no depression [Easy Bleeding] : no easy bleeding [Easy Bruising] : no easy bruising [Swollen Glands] : no swollen glands

## 2019-03-23 NOTE — ASSESSMENT
[FreeTextEntry1] : Physical exam shows a well-developed man in no acute distress his blood pressure was 131/73 height 5 feet 9 inches weight 220 pounds pulse 84 respirations 14 HEENT was unremarkable chest was near cardiovascular exam was regular with no extra heart sounds or murmurs abdomen soft and nontender extremities showed no edema neurologic exam of tests were performed December 28, 2018 showing a normal hemoglobin A1c his total cholesterol was 203 his HDL was 44 his LDL was slightly elevated at 139. He has plans to improve his diet and to increase his exercise patient is working with his urologist recently as given above good report on his bladder with attempting Cialis this is noneffective injection therapy was discussed with the patient is up-to-date with his ophthalmologist and dermatologist

## 2019-03-23 NOTE — HISTORY OF PRESENT ILLNESS
[FreeTextEntry1] : Comes to the office for followup to review his medications and discuss his overall health issues to discuss some issues with erections [de-identified] : 64-year-old man with a history of hyperlipidemia cryptogenic stroke bladder cancer hypertension comes to the office for routine followup recently the patient complaining of nocturia and some erectile dysfunction he denies temperature chills sweats or myalgias he's had no headache sinus congestion sore throat cough wheezing pleurisy chest pain shortness of breath exertional dyspnea lightheadedness palpitations dizziness vertigo or syncope he denies abdominal pain nausea vomiting diarrhea constipation bright red blood per rectum or black stools his joints have been stiff but denies any significant musculoskeletal complaints his appetite has been good his weight has been stable has noted his blood pressure at home to be well controlled

## 2019-04-03 ENCOUNTER — APPOINTMENT (OUTPATIENT)
Dept: OPHTHALMOLOGY | Facility: CLINIC | Age: 64
End: 2019-04-03

## 2019-04-22 ENCOUNTER — APPOINTMENT (OUTPATIENT)
Dept: INTERNAL MEDICINE | Facility: CLINIC | Age: 64
End: 2019-04-22
Payer: COMMERCIAL

## 2019-04-22 VITALS — DIASTOLIC BLOOD PRESSURE: 76 MMHG | SYSTOLIC BLOOD PRESSURE: 128 MMHG

## 2019-04-22 VITALS
DIASTOLIC BLOOD PRESSURE: 82 MMHG | SYSTOLIC BLOOD PRESSURE: 124 MMHG | RESPIRATION RATE: 15 BRPM | HEART RATE: 76 BPM | TEMPERATURE: 98.8 F

## 2019-04-22 VITALS — HEIGHT: 69 IN | WEIGHT: 228 LBS | BODY MASS INDEX: 33.77 KG/M2

## 2019-04-22 DIAGNOSIS — Z87.09 PERSONAL HISTORY OF OTHER DISEASES OF THE RESPIRATORY SYSTEM: ICD-10-CM

## 2019-04-22 PROCEDURE — 99214 OFFICE O/P EST MOD 30 MIN: CPT

## 2019-04-29 ENCOUNTER — MEDICATION RENEWAL (OUTPATIENT)
Age: 64
End: 2019-04-29

## 2019-05-29 ENCOUNTER — EMERGENCY (EMERGENCY)
Facility: HOSPITAL | Age: 64
LOS: 1 days | Discharge: HOME | End: 2019-05-29
Attending: EMERGENCY MEDICINE | Admitting: EMERGENCY MEDICINE
Payer: COMMERCIAL

## 2019-05-29 ENCOUNTER — APPOINTMENT (OUTPATIENT)
Dept: INTERNAL MEDICINE | Facility: CLINIC | Age: 64
End: 2019-05-29
Payer: COMMERCIAL

## 2019-05-29 VITALS
HEIGHT: 69 IN | TEMPERATURE: 98 F | WEIGHT: 210.1 LBS | SYSTOLIC BLOOD PRESSURE: 142 MMHG | OXYGEN SATURATION: 98 % | HEART RATE: 96 BPM | RESPIRATION RATE: 18 BRPM | DIASTOLIC BLOOD PRESSURE: 86 MMHG

## 2019-05-29 VITALS
OXYGEN SATURATION: 97 % | HEART RATE: 86 BPM | SYSTOLIC BLOOD PRESSURE: 112 MMHG | DIASTOLIC BLOOD PRESSURE: 67 MMHG | RESPIRATION RATE: 15 BRPM

## 2019-05-29 VITALS
WEIGHT: 229 LBS | RESPIRATION RATE: 15 BRPM | HEART RATE: 80 BPM | TEMPERATURE: 98.4 F | DIASTOLIC BLOOD PRESSURE: 74 MMHG | HEIGHT: 69 IN | BODY MASS INDEX: 33.92 KG/M2 | SYSTOLIC BLOOD PRESSURE: 122 MMHG

## 2019-05-29 DIAGNOSIS — M79.669 PAIN IN UNSPECIFIED LOWER LEG: ICD-10-CM

## 2019-05-29 DIAGNOSIS — Z90.49 ACQUIRED ABSENCE OF OTHER SPECIFIED PARTS OF DIGESTIVE TRACT: Chronic | ICD-10-CM

## 2019-05-29 DIAGNOSIS — Z98.890 OTHER SPECIFIED POSTPROCEDURAL STATES: Chronic | ICD-10-CM

## 2019-05-29 PROCEDURE — 93971 EXTREMITY STUDY: CPT

## 2019-05-29 PROCEDURE — 99284 EMERGENCY DEPT VISIT MOD MDM: CPT | Mod: 25

## 2019-05-29 PROCEDURE — 99284 EMERGENCY DEPT VISIT MOD MDM: CPT

## 2019-05-29 PROCEDURE — 99215 OFFICE O/P EST HI 40 MIN: CPT

## 2019-05-29 PROCEDURE — 93971 EXTREMITY STUDY: CPT | Mod: 26,RT

## 2019-05-29 RX ORDER — LISINOPRIL 2.5 MG/1
0 TABLET ORAL
Qty: 0 | Refills: 0 | DISCHARGE

## 2019-05-29 RX ORDER — AZITHROMYCIN 250 MG/1
250 TABLET, FILM COATED ORAL
Qty: 1 | Refills: 1 | Status: DISCONTINUED | COMMUNITY
Start: 2019-04-22 | End: 2019-05-29

## 2019-05-29 NOTE — ASSESSMENT
[FreeTextEntry1] : Physical exam shows a well developed man in no acute distress blood pressure 124/82 pulse of 76 temperature 98.8°F orally respiratory rate of 15 HEENT showed nostrils watery mucus chest showed scattered rhonchi there was no rales wheezing or dullness to percussion cardiovascular exam was regular abdomen was soft extremities showed no clubbing cyanosis or edema neurologic exam was nonfocal question upper respiratory infection treated with Flonase and antihistamine Mucinex DM Solu-Medrol pack and azithromycin. He will call back daily to check in

## 2019-05-29 NOTE — HISTORY OF PRESENT ILLNESS
[___ Days ago] : [unfilled] days ago [Musculoskeletal Symptoms Legs] : leg [Moderate] : moderate [Constant] : constant [Ice] : ice [Activity] : with activity [Worsening] : worsening [FreeTextEntry8] : 64-year-old man with a history of hypertension bladder cancer erectile dysfunction hyperlipidemia degenerative stroke presents to the office with 2 days of incredible pain and swelling in his right leg right calf is swollen tense and painful he doesn't remember doing anything to cause it but it did occur while working at [employer] where he is on his feet constantly he denies temperature chills sweats or myalgias he's had no headache sinus congestion sore throat cough wheezing pleurisy chest pain shortness of breath exertional dyspnea lightheadedness palpitations dizziness vertigo or syncope he has had no abdominal pain nausea vomiting diarrhea or constipation bright red blood per rectum or black stools his appetite has been good his weight is stable

## 2019-05-29 NOTE — ED ADULT NURSE REASSESSMENT NOTE - NS ED NURSE REASSESS COMMENT FT1
ultrasound in progress at this time
MD and PA aware of results. family at bedside. pt d/c to self. NAD. denies pain presently

## 2019-05-29 NOTE — PHYSICAL EXAM
[No Acute Distress] : no acute distress [Well Developed] : well developed [Well Nourished] : well nourished [Well-Appearing] : well-appearing [Normal Voice/Communication] : normal voice/communication [PERRL] : pupils equal round and reactive to light [Normal Sclera/Conjunctiva] : normal sclera/conjunctiva [EOMI] : extraocular movements intact [Normal Outer Ear/Nose] : the outer ears and nose were normal in appearance [Normal TMs] : both tympanic membranes were normal [Normal Oropharynx] : the oropharynx was normal [No JVD] : no jugular venous distention [Supple] : supple [No Lymphadenopathy] : no lymphadenopathy [No Respiratory Distress] : no respiratory distress  [Thyroid Normal, No Nodules] : the thyroid was normal and there were no nodules present [No Accessory Muscle Use] : no accessory muscle use [Clear to Auscultation] : lungs were clear to auscultation bilaterally [Normal Percussion] : the chest was normal to percussion [Normal Rate] : normal rate  [Regular Rhythm] : with a regular rhythm [No Murmur] : no murmur heard [Normal S1, S2] : normal S1 and S2 [No Carotid Bruits] : no carotid bruits [No Abdominal Bruit] : a ~M bruit was not heard ~T in the abdomen [No Varicosities] : no varicosities [Pedal Pulses Present] : the pedal pulses are present [No Edema] : there was no peripheral edema [No Extremity Clubbing/Cyanosis] : no extremity clubbing/cyanosis [No Palpable Aorta] : no palpable aorta [Declined Breast Exam] : declined breast exam  [Non Tender] : non-tender [Soft] : abdomen soft [Non-distended] : non-distended [No HSM] : no HSM [No Masses] : no abdominal mass palpated [No Hernias] : no hernias [Normal Bowel Sounds] : normal bowel sounds [Declined Rectal Exam] : declined rectal exam [Normal Supraclavicular Nodes] : no supraclavicular lymphadenopathy [Normal Posterior Cervical Nodes] : no posterior cervical lymphadenopathy [Normal Axillary Nodes] : no axillary lymphadenopathy [Normal Anterior Cervical Nodes] : no anterior cervical lymphadenopathy [Normal Femoral Nodes] : no femoral lymphadenopathy [Normal Inguinal Nodes] : no inguinal lymphadenopathy [No CVA Tenderness] : no CVA  tenderness [No Spinal Tenderness] : no spinal tenderness [No Joint Swelling] : no joint swelling [Grossly Normal Strength/Tone] : grossly normal strength/tone [No Skin Lesions] : no skin lesions [No Rash] : no rash [Normal Gait] : normal gait [Coordination Grossly Intact] : coordination grossly intact [No Focal Deficits] : no focal deficits [Deep Tendon Reflexes (DTR)] : deep tendon reflexes were 2+ and symmetric [Memory Grossly Normal] : memory grossly normal [Speech Grossly Normal] : speech grossly normal [Normal Affect] : the affect was normal [Alert and Oriented x3] : oriented to person, place, and time [Normal Mood] : the mood was normal [Normal Insight/Judgement] : insight and judgment were intact [Scoliosis] : no scoliosis [Kyphosis] : no kyphosis [Acne] : no acne [de-identified] : watery

## 2019-05-29 NOTE — PHYSICAL EXAM
[Well Nourished] : well nourished [Well Developed] : well developed [No Acute Distress] : no acute distress [Well-Appearing] : well-appearing [Normal Voice/Communication] : normal voice/communication [Normal Sclera/Conjunctiva] : normal sclera/conjunctiva [Normal Outer Ear/Nose] : the outer ears and nose were normal in appearance [PERRL] : pupils equal round and reactive to light [EOMI] : extraocular movements intact [Normal TMs] : both tympanic membranes were normal [No JVD] : no jugular venous distention [Normal Oropharynx] : the oropharynx was normal [Supple] : supple [Thyroid Normal, No Nodules] : the thyroid was normal and there were no nodules present [No Lymphadenopathy] : no lymphadenopathy [No Respiratory Distress] : no respiratory distress  [Clear to Auscultation] : lungs were clear to auscultation bilaterally [No Accessory Muscle Use] : no accessory muscle use [Normal Rate] : normal rate  [Normal Percussion] : the chest was normal to percussion [Regular Rhythm] : with a regular rhythm [Normal S1, S2] : normal S1 and S2 [No Murmur] : no murmur heard [Pedal Pulses Present] : the pedal pulses are present [No Abdominal Bruit] : a ~M bruit was not heard ~T in the abdomen [No Carotid Bruits] : no carotid bruits [No Varicosities] : no varicosities [No Palpable Aorta] : no palpable aorta [No Edema] : there was no peripheral edema [No Extremity Clubbing/Cyanosis] : no extremity clubbing/cyanosis [Non Tender] : non-tender [Soft] : abdomen soft [Declined Breast Exam] : declined breast exam  [No Masses] : no abdominal mass palpated [Non-distended] : non-distended [No HSM] : no HSM [Normal Bowel Sounds] : normal bowel sounds [Declined Rectal Exam] : declined rectal exam [No Hernias] : no hernias [Normal Supraclavicular Nodes] : no supraclavicular lymphadenopathy [Normal Axillary Nodes] : no axillary lymphadenopathy [Normal Femoral Nodes] : no femoral lymphadenopathy [Normal Posterior Cervical Nodes] : no posterior cervical lymphadenopathy [No CVA Tenderness] : no CVA  tenderness [Normal Anterior Cervical Nodes] : no anterior cervical lymphadenopathy [Normal Inguinal Nodes] : no inguinal lymphadenopathy [No Spinal Tenderness] : no spinal tenderness [No Joint Swelling] : no joint swelling [No Skin Lesions] : no skin lesions [No Rash] : no rash [Normal Gait] : normal gait [Coordination Grossly Intact] : coordination grossly intact [Deep Tendon Reflexes (DTR)] : deep tendon reflexes were 2+ and symmetric [No Focal Deficits] : no focal deficits [Speech Grossly Normal] : speech grossly normal [Normal Affect] : the affect was normal [Alert and Oriented x3] : oriented to person, place, and time [Memory Grossly Normal] : memory grossly normal [Normal Mood] : the mood was normal [Normal Insight/Judgement] : insight and judgment were intact [Kyphosis] : no kyphosis [Scoliosis] : no scoliosis [Acne] : no acne [de-identified] : watery [de-identified] : massive right leg swelling

## 2019-05-29 NOTE — REVIEW OF SYSTEMS
[Fatigue] : fatigue [Postnasal Drip] : postnasal drip [Nasal Discharge] : nasal discharge [Hoarseness] : hoarseness [Cough] : cough [Nocturia] : nocturia [Impotence] : impotence [Poor Libido] : poor libido [Joint Stiffness] : joint stiffness [Muscle Pain] : muscle pain [Anxiety] : anxiety [Fever] : no fever [Night Sweats] : no night sweats [Hot Flashes] : no hot flashes [Chills] : no chills [Recent Change In Weight] : ~T no recent weight change [Discharge] : no discharge [Pain] : no pain [Redness] : no redness [Vision Problems] : no vision problems [Dryness] : no dryness [Itching] : no itching [Hearing Loss] : no hearing loss [Earache] : no earache [Nosebleeds] : no nosebleeds [Sore Throat] : no sore throat [Palpitations] : no palpitations [Claudication] : no  leg claudication [Chest Pain] : no chest pain [Paroysmal Nocturnal Dyspnea] : no paroysmal nocturnal dyspnea [Orthopena] : no orthopnea [Lower Ext Edema] : no lower extremity edema [Shortness Of Breath] : no shortness of breath [Wheezing] : no wheezing [Abdominal Pain] : no abdominal pain [Dyspnea on Exertion] : not dyspnea on exertion [Nausea] : no nausea [Constipation] : no constipation [Diarrhea] : no diarrhea [Vomiting] : no vomiting [Melena] : no melena [Dysuria] : no dysuria [Heartburn] : no heartburn [Incontinence] : no incontinence [Hesitancy] : no hesitancy [Hematuria] : no hematuria [Joint Pain] : no joint pain [Frequency] : no frequency [Muscle Weakness] : no muscle weakness [Joint Swelling] : no joint swelling [Back Pain] : no back pain [Mole Changes] : no mole changes [Nail Changes] : no nail changes [Skin Rash] : no skin rash [Hair Changes] : no hair changes [Dizziness] : no dizziness [Headache] : no headache [Fainting] : no fainting [Unsteady Walk] : no ataxia [Confusion] : no confusion [Memory Loss] : no memory loss [Suicidal] : not suicidal [Easy Bleeding] : no easy bleeding [Depression] : no depression [Insomnia] : no insomnia [Swollen Glands] : no swollen glands [Easy Bruising] : no easy bruising [FreeTextEntry9] : tender swollen calf

## 2019-05-29 NOTE — ASSESSMENT
[FreeTextEntry1] : Physical exam shows a well-developed man in moderate discomfort secondary to leg pain pain is about a 7/10 is afebrile at 98.4°F orally blood pressure 122/74 pulse of 80 respirations 15 HEENT was unremarkable chest was clear to auscultation and percussion cardiovascular exam showed normal S1 and S2 with no extra heart sounds or murmurs abdomen was soft extremities right leg was swollen 2 times the size of the left leg calf being the most intense area with extreme tenderness to palpation tenderness also behind the right knee and Homans positive and will be sent immediately to the emergency room differential includes muscle or tendon rupture or tear versus spontaneous bleed doubt DVT

## 2019-05-29 NOTE — HEALTH RISK ASSESSMENT
[No falls in past year] : Patient reported no falls in the past year [0] : 2) Feeling down, depressed, or hopeless: Not at all (0) [[id number]] : 0

## 2019-05-29 NOTE — ED PROVIDER NOTE - CLINICAL SUMMARY MEDICAL DECISION MAKING FREE TEXT BOX
pt 64 m hx HTN c/o right leg pain that started this AM. pt works at [employer] and he was walking all day and as the day went on the pain behind the knee increased and started radiating up leg and down to calf and he noticed swelling. went to Dr. Jones and sent for US. Ext:   TTP right posterior knee and calf. +swelling. no ecchymosis or discoloration. soft compartments. FROM, normal cap refill. 2+pedal pulse. US neg DVT. f/u Dr. Jones pt 64 m hx HTN c/o right leg pain that started this AM. pt works at [employer] and he was walking all day and as the day went on the pain behind the knee increased and started radiating up leg and down to calf and he noticed swelling. went to Dr. Jones and sent for US. Ext:   TTP right posterior knee and calf. +swelling. no ecchymosis or discoloration. soft compartments. FROM, normal cap refill. 2+pedal pulse. US neg DVT. f/u Dr. Jones. discussed if pain is still persistent to repeat US in 10 days to reevlauate for blood clot

## 2019-05-29 NOTE — ED PROVIDER NOTE - OBJECTIVE STATEMENT
pt 64 m hx HTN c/o right leg pain that started this AM. pt works at [employer] and he was walking all day and as the day went on the pain behind the knee increased and started radiating up leg and down to calf and he noticed swelling. went to Dr. Jones and sent for US  did not take any medications for pain and does not want any  denies recent travel, hx or fhx blood clots

## 2019-05-29 NOTE — ED PROVIDER NOTE - PHYSICAL EXAMINATION
General:     NAD, well-nourished, well-appearing  Eyes: PERRL  Head:     NC/AT, EOMI, oral mucosa moist  Neck:     trachea midline  Lungs:     CTA b/l  CVS:     RRR  Abd:     +BS, s/nt/nd  Ext:   TTP roght posterior knee and calf. +swelling. no ecchymosis or discoloration. soft compartments. FROM, normal cap refill. 2+pedal pulse  Neuro: AAOx3, normal gait General:     NAD, well-nourished, well-appearing  Eyes: PERRL  Head:     NC/AT, EOMI, oral mucosa moist  Neck:     trachea midline  Lungs:     CTA b/l  CVS:     RRR  Abd:     +BS, s/nt/nd  Ext:   TTP right posterior knee and calf. +swelling. no ecchymosis or discoloration. soft compartments. FROM, normal cap refill. 2+pedal pulse  Neuro: AAOx3, normal gait

## 2019-05-29 NOTE — ED PROVIDER NOTE - NSFOLLOWUPINSTRUCTIONS_ED_ALL_ED_FT
Rest, ice, elevate area.  Take Motrin 600mg every 8 hrs with food for pain.  Follow up with primary care provider within 48-72 hrs.  Any worsening pain, swelling, weakness, numbness return to ED.

## 2019-05-29 NOTE — HEALTH RISK ASSESSMENT
[No falls in past year] : Patient reported no falls in the past year [0] : 2) Feeling down, depressed, or hopeless: Not at all (0) [[id number]] : 0

## 2019-05-29 NOTE — ED ADULT NURSE NOTE - OBJECTIVE STATEMENT
pt reports pain behind his right knee and to his lower right leg. reports pain started yesterday pt reports pain behind his right knee and to his lower right leg. reports pain started yesterday. pt denies long care ride or recent air travel

## 2019-05-29 NOTE — ED PROVIDER NOTE - ATTENDING CONTRIBUTION TO CARE
Ramses with ANNIKA Ascencio. pt 64 m hx HTN c/o right leg pain that started this AM. pt works at [employer] and he was walking all day and as the day went on the pain behind the knee increased and started radiating up leg and down to calf and he noticed swelling. went to Dr. Kyle and sent for US. Ext:   TTP right posterior knee and calf. +swelling. no ecchymosis or discoloration. soft compartments. FROM, normal cap refill. 2+pedal pulse. US neg DVT. f/u Dr. Kyle. discussed if pain is still persistent to repeat US in 10 days to re-evaluate for blood clot. I performed a face to face bedside interview with patient regarding history of present illness, review of symptoms and past medical history. I completed an independent physical exam.  I have discussed the patient's plan of care with Physician Assistant (PA). I agree with note as stated above, having amended the EMR as needed to reflect my findings.   This includes History of Present Illness, HIV, Past Medical/Surgical/Family/Social History, Allergies and Home Medications, Review of Systems, Physical Exam, and any Progress Notes during the time I functioned as the attending physician for this patient.

## 2019-05-29 NOTE — HISTORY OF PRESENT ILLNESS
[Moderate] : moderate [Cold Symptoms] : cold symptoms [Constant] : constant [Gradual] : gradually [Congestion] : congestion [Cough] : cough [OTC Remedies] : OTC remedies [Rest] : rest [Worsening] : worsening [___ Days ago] : [unfilled] days ago [Wheezing] : no wheezing [Sore Throat] : no sore throat [Anorexia] : no anorexia [Chills] : no chills [Shortness Of Breath] : no shortness of breath [Earache] : no earache [Headache] : no headache [Fatigue] : not fatigue [Fever] : no fever [FreeTextEntry8] : 64-year-old man comes to the acutely with a 5 day history of progressive sinus congestion scratchy throat and a productive cough patient has had significant postnasal drip prompting spasms of coughing patient has not seen any colorful mucus although it has been he denies temperature chills sweats or myalgias his head no headache wheezing pleurisy chest pain shortness of breath exertional dyspnea lightheadedness palpitations dizziness vertigo or syncope patient denies abdominal pain nausea vomiting diarrhea constipation bright red blood per rectum or black stools his appetite has been good his weight has been stable he denies significant musculoskeletal complaints he has had no dysuria or polyuria or significant nocturia

## 2019-05-29 NOTE — REVIEW OF SYSTEMS
[Postnasal Drip] : postnasal drip [Nasal Discharge] : nasal discharge [Hoarseness] : hoarseness [Cough] : cough [Nocturia] : nocturia [Impotence] : impotence [Poor Libido] : poor libido [Joint Stiffness] : joint stiffness [Anxiety] : anxiety [Fatigue] : fatigue [Fever] : no fever [Hot Flashes] : no hot flashes [Night Sweats] : no night sweats [Chills] : no chills [Recent Change In Weight] : ~T no recent weight change [Discharge] : no discharge [Redness] : no redness [Pain] : no pain [Vision Problems] : no vision problems [Dryness] : no dryness [Itching] : no itching [Hearing Loss] : no hearing loss [Earache] : no earache [Sore Throat] : no sore throat [Nosebleeds] : no nosebleeds [Palpitations] : no palpitations [Chest Pain] : no chest pain [Lower Ext Edema] : no lower extremity edema [Orthopena] : no orthopnea [Claudication] : no  leg claudication [Paroysmal Nocturnal Dyspnea] : no paroysmal nocturnal dyspnea [Shortness Of Breath] : no shortness of breath [Abdominal Pain] : no abdominal pain [Wheezing] : no wheezing [Dyspnea on Exertion] : not dyspnea on exertion [Nausea] : no nausea [Constipation] : no constipation [Diarrhea] : no diarrhea [Vomiting] : no vomiting [Melena] : no melena [Heartburn] : no heartburn [Incontinence] : no incontinence [Dysuria] : no dysuria [Hesitancy] : no hesitancy [Hematuria] : no hematuria [Muscle Pain] : no muscle pain [Joint Pain] : no joint pain [Frequency] : no frequency [Muscle Weakness] : no muscle weakness [Back Pain] : no back pain [Joint Swelling] : no joint swelling [Hair Changes] : no hair changes [Mole Changes] : no mole changes [Nail Changes] : no nail changes [Skin Rash] : no skin rash [Headache] : no headache [Fainting] : no fainting [Confusion] : no confusion [Dizziness] : no dizziness [Suicidal] : not suicidal [Unsteady Walk] : no ataxia [Memory Loss] : no memory loss [Easy Bleeding] : no easy bleeding [Insomnia] : no insomnia [Depression] : no depression [Easy Bruising] : no easy bruising [Swollen Glands] : no swollen glands

## 2019-05-29 NOTE — ED ADULT NURSE NOTE - NSIMPLEMENTINTERV_GEN_ALL_ED
Implemented All Fall with Harm Risk Interventions:  [place] to call system. Call bell, personal items and telephone within reach. Instruct patient to call for assistance. Room bathroom lighting operational. Non-slip footwear when patient is off stretcher. Physically safe environment: no spills, clutter or unnecessary equipment. Stretcher in lowest position, wheels locked, appropriate side rails in place. Provide visual cue, wrist band, yellow gown, etc. Monitor gait and stability. Monitor for mental status changes and reorient to person, place, and time. Review medications for side effects contributing to fall risk. Reinforce activity limits and safety measures with patient and family. Provide visual clues: red socks.

## 2019-06-10 PROBLEM — E78.00 PURE HYPERCHOLESTEROLEMIA, UNSPECIFIED: Chronic | Status: ACTIVE | Noted: 2019-05-29

## 2019-06-13 ENCOUNTER — APPOINTMENT (OUTPATIENT)
Dept: ORTHOPEDIC SURGERY | Facility: CLINIC | Age: 64
End: 2019-06-13
Payer: OTHER MISCELLANEOUS

## 2019-06-13 VITALS — WEIGHT: 228 LBS | HEIGHT: 69 IN | BODY MASS INDEX: 33.77 KG/M2

## 2019-06-13 PROCEDURE — 73564 X-RAY EXAM KNEE 4 OR MORE: CPT | Mod: RT

## 2019-06-13 PROCEDURE — 99203 OFFICE O/P NEW LOW 30 MIN: CPT

## 2019-06-13 NOTE — HISTORY OF PRESENT ILLNESS
[de-identified] : Pt is a 63 y/o male c/o right knee pain x 2 weeks.  He states that he was at work on 5/28/19 and felt sudden and immediate pain over the posterior knee.  He does not recall the mechanism of injury but he states that he was pulling a lot of weight that day. By the end of the day he was unable to walk.  He was seen by his PCP Dr Jones, 2 days later  who sent him to [place] ED to evaluate for DVT.  Doppler report returned negative for DVT. He had swelling in his leg as well.  He has pain when he walks and stands.  It is difficult to stand from sitting.  The swelling and pain have both improved slightly but it has not resolved.  The pain has improved a little.  He returned to work this week.  He is not currently taking anything for pain.\par Worker's Compensation initial visit.

## 2019-06-13 NOTE — ADDENDUM
[FreeTextEntry1] : I, Mayte Lentz wrote this note acting as a scribe for Dr. Helio Ellison on Jun 13, 2019.

## 2019-06-13 NOTE — END OF VISIT
[FreeTextEntry3] : I, Helio Ellison MD, ordering physician, have read and attest that all the information, medical decision making and discharge instructions within are true and accurate.

## 2019-06-13 NOTE — PHYSICAL EXAM
[de-identified] : Patient is WDWN, alert, and in no acute distress. Breathing is unlabored. He is grossly oriented to person, place, and time. \par \par Right knee: There is medial joint line tenderness and tenderness to palpation over the posterior aspect. Edema present. No valgus or valgus instability present on provocative testing. Flexion and extension 5/5.\par Range of motion: Extension 0 °, flexion to 100 °. No crepitus.\par Tests and Signs: All tests for stability are normal. \par Strength: flexion and extension 5/5  [de-identified] : AP, lateral, skyline, and tunnel views of the right knee were obtained and revealed moderate tricompartmental osteoarthritis.

## 2019-06-13 NOTE — DISCUSSION/SUMMARY
[de-identified] : The underlying pathophysiology was reviewed with the patient. Treatment options were discussed including; observation, NSAIDS, analgesics, bracing, injection, physical therapy. \par \par A script for Meloxicam 15 mg QD was provided. \par Range of motion exercises were encouraged. \par Topical analgesics as needed. \par Patient was advised to continue with observation of his symptoms. Follow up as needed. \par

## 2019-06-21 ENCOUNTER — APPOINTMENT (OUTPATIENT)
Dept: INTERNAL MEDICINE | Facility: CLINIC | Age: 64
End: 2019-06-21
Payer: COMMERCIAL

## 2019-06-21 ENCOUNTER — RX RENEWAL (OUTPATIENT)
Age: 64
End: 2019-06-21

## 2019-06-21 ENCOUNTER — OTHER (OUTPATIENT)
Age: 64
End: 2019-06-21

## 2019-06-21 VITALS
BODY MASS INDEX: 33.62 KG/M2 | HEART RATE: 78 BPM | SYSTOLIC BLOOD PRESSURE: 128 MMHG | HEIGHT: 69 IN | WEIGHT: 227 LBS | DIASTOLIC BLOOD PRESSURE: 74 MMHG | RESPIRATION RATE: 14 BRPM

## 2019-06-21 PROCEDURE — 99215 OFFICE O/P EST HI 40 MIN: CPT

## 2019-06-21 RX ORDER — FLUTICASONE PROPIONATE 50 UG/1
50 SPRAY, METERED NASAL DAILY
Qty: 1 | Refills: 2 | Status: DISCONTINUED | COMMUNITY
Start: 2019-04-22 | End: 2019-06-21

## 2019-06-21 NOTE — HEALTH RISK ASSESSMENT
[No falls in past year] : Patient reported no falls in the past year [0] : 1) Little interest or pleasure doing things: Not at all (0) [[id number]] : 0

## 2019-06-21 NOTE — REVIEW OF SYSTEMS
[Fatigue] : fatigue [Postnasal Drip] : postnasal drip [Nasal Discharge] : nasal discharge [Hoarseness] : hoarseness [Cough] : cough [Nocturia] : nocturia [Impotence] : impotence [Poor Libido] : poor libido [Joint Stiffness] : joint stiffness [Muscle Pain] : muscle pain [Anxiety] : anxiety [Fever] : no fever [Chills] : no chills [Hot Flashes] : no hot flashes [Night Sweats] : no night sweats [Recent Change In Weight] : ~T no recent weight change [Discharge] : no discharge [Pain] : no pain [Redness] : no redness [Dryness] : no dryness [Vision Problems] : no vision problems [Itching] : no itching [Earache] : no earache [Hearing Loss] : no hearing loss [Nosebleeds] : no nosebleeds [Sore Throat] : no sore throat [Chest Pain] : no chest pain [Palpitations] : no palpitations [Claudication] : no  leg claudication [Lower Ext Edema] : no lower extremity edema [Orthopena] : no orthopnea [Paroysmal Nocturnal Dyspnea] : no paroysmal nocturnal dyspnea [Shortness Of Breath] : no shortness of breath [Wheezing] : no wheezing [Dyspnea on Exertion] : not dyspnea on exertion [Abdominal Pain] : no abdominal pain [Nausea] : no nausea [Constipation] : no constipation [Diarrhea] : no diarrhea [Vomiting] : no vomiting [Heartburn] : no heartburn [Melena] : no melena [Dysuria] : no dysuria [Incontinence] : no incontinence [Hesitancy] : no hesitancy [Hematuria] : no hematuria [Frequency] : no frequency [Muscle Weakness] : no muscle weakness [Back Pain] : no back pain [Joint Swelling] : no joint swelling [Itching] : no itching [Mole Changes] : no mole changes [Nail Changes] : no nail changes [Hair Changes] : no hair changes [Skin Rash] : no skin rash [Headache] : no headache [Dizziness] : no dizziness [Fainting] : no fainting [Confusion] : no confusion [Unsteady Walk] : no ataxia [Memory Loss] : no memory loss [Suicidal] : not suicidal [Insomnia] : no insomnia [Depression] : no depression [Easy Bleeding] : no easy bleeding [Easy Bruising] : no easy bruising [Swollen Glands] : no swollen glands [FreeTextEntry9] : tender swollen calf and swollen knee /right

## 2019-06-21 NOTE — ASSESSMENT
[FreeTextEntry1] : Physical examination shows a well-developed man in some distress secondary to knee pain blood pressure 128/74 pulse is 78 respirations 16 HEENT was unremarkable chest was clear cardiovascular was regular abdomen was soft extremities showed swollen right knee with associated right calf swelling negative Homans sign there was trace bilateral edema neurologic exam was nonfocal.............. patient reports x-rays taken by the orthopedic showed severe osteoarthritis he was offered an injection but deferred suggested physical therapy to get that injection of steroids and/or cartilage and to use a brace support the need for now we will continue his anti-inflammatory meloxicam and if no improvement will require an MRI of the right knee and possible consideration for surgical knee replacement.Blood pressure well controlled at current meeting patient is up-to-date with his ophthalmologist and dermatologist and will call me daily to report in

## 2019-06-21 NOTE — PHYSICAL EXAM
[No Acute Distress] : no acute distress [Well Nourished] : well nourished [Well Developed] : well developed [Well-Appearing] : well-appearing [Normal Sclera/Conjunctiva] : normal sclera/conjunctiva [Normal Voice/Communication] : normal voice/communication [PERRL] : pupils equal round and reactive to light [EOMI] : extraocular movements intact [Normal Outer Ear/Nose] : the outer ears and nose were normal in appearance [Normal Oropharynx] : the oropharynx was normal [Normal TMs] : both tympanic membranes were normal [No JVD] : no jugular venous distention [No Lymphadenopathy] : no lymphadenopathy [Supple] : supple [No Respiratory Distress] : no respiratory distress  [Thyroid Normal, No Nodules] : the thyroid was normal and there were no nodules present [No Accessory Muscle Use] : no accessory muscle use [Clear to Auscultation] : lungs were clear to auscultation bilaterally [Normal Rate] : normal rate  [Normal Percussion] : the chest was normal to percussion [Regular Rhythm] : with a regular rhythm [Normal S1, S2] : normal S1 and S2 [No Murmur] : no murmur heard [No Carotid Bruits] : no carotid bruits [No Varicosities] : no varicosities [No Abdominal Bruit] : a ~M bruit was not heard ~T in the abdomen [Pedal Pulses Present] : the pedal pulses are present [No Edema] : there was no peripheral edema [No Extremity Clubbing/Cyanosis] : no extremity clubbing/cyanosis [No Palpable Aorta] : no palpable aorta [Soft] : abdomen soft [Declined Breast Exam] : declined breast exam  [Non Tender] : non-tender [Non-distended] : non-distended [No Masses] : no abdominal mass palpated [Normal Bowel Sounds] : normal bowel sounds [No HSM] : no HSM [No Hernias] : no hernias [Declined Rectal Exam] : declined rectal exam [Normal Supraclavicular Nodes] : no supraclavicular lymphadenopathy [Normal Axillary Nodes] : no axillary lymphadenopathy [Normal Posterior Cervical Nodes] : no posterior cervical lymphadenopathy [Normal Femoral Nodes] : no femoral lymphadenopathy [Normal Anterior Cervical Nodes] : no anterior cervical lymphadenopathy [Normal Inguinal Nodes] : no inguinal lymphadenopathy [No CVA Tenderness] : no CVA  tenderness [No Spinal Tenderness] : no spinal tenderness [No Rash] : no rash [No Joint Swelling] : no joint swelling [No Skin Lesions] : no skin lesions [Normal Gait] : normal gait [Coordination Grossly Intact] : coordination grossly intact [Deep Tendon Reflexes (DTR)] : deep tendon reflexes were 2+ and symmetric [No Focal Deficits] : no focal deficits [Speech Grossly Normal] : speech grossly normal [Normal Affect] : the affect was normal [Memory Grossly Normal] : memory grossly normal [Normal Mood] : the mood was normal [Alert and Oriented x3] : oriented to person, place, and time [Normal Insight/Judgement] : insight and judgment were intact [Kyphosis] : no kyphosis [Scoliosis] : no scoliosis [Acne] : no acne [de-identified] : watery [de-identified] : massive right leg swelling

## 2019-06-21 NOTE — HISTORY OF PRESENT ILLNESS
[FreeTextEntry1] : Comes to the office for followup review his medications and discuss his overall health main issues dealing with severe right knee and calf tenderness [de-identified] : 64-year-old man with a history of bladder cancer hypertension degenerative stroke chronic headaches hyperlipidemia and osteoarthritis of the right knee and so the office for followup accompanied by his significant other patient's main complaint is swelling of the right knee and calf patient went to the emergency room 10 days ago and a Doppler study which was negative patient has been seeing Dr. Ellison/orthopedist who placed him on  Meloxicam which has given him no relief he denies temperature chills sweats or myalgias he's had no headache sinus congestion sore throat cough wheezing pleurisy chest pain shortness of breath exertional dyspnea lightheadedness palpitations dizziness vertigo or syncope denies urinary symptoms has had no claudication or skin rashes he denies abdominal pain nausea vomiting diarrhea constipation bright red blood per rectum or black stools appetite has been good his weight has been stable despite his swollen knee is continuing full-time employment at [employer]

## 2019-06-25 ENCOUNTER — APPOINTMENT (OUTPATIENT)
Dept: MRI IMAGING | Facility: HOSPITAL | Age: 64
End: 2019-06-25
Payer: COMMERCIAL

## 2019-06-25 ENCOUNTER — OUTPATIENT (OUTPATIENT)
Dept: OUTPATIENT SERVICES | Facility: HOSPITAL | Age: 64
LOS: 1 days | End: 2019-06-25
Payer: COMMERCIAL

## 2019-06-25 DIAGNOSIS — Z98.890 OTHER SPECIFIED POSTPROCEDURAL STATES: Chronic | ICD-10-CM

## 2019-06-25 DIAGNOSIS — M17.11 UNILATERAL PRIMARY OSTEOARTHRITIS, RIGHT KNEE: ICD-10-CM

## 2019-06-25 DIAGNOSIS — Z90.49 ACQUIRED ABSENCE OF OTHER SPECIFIED PARTS OF DIGESTIVE TRACT: Chronic | ICD-10-CM

## 2019-06-25 PROCEDURE — 73721 MRI JNT OF LWR EXTRE W/O DYE: CPT

## 2019-06-25 PROCEDURE — 73721 MRI JNT OF LWR EXTRE W/O DYE: CPT | Mod: 26,RT

## 2019-07-01 ENCOUNTER — RX RENEWAL (OUTPATIENT)
Age: 64
End: 2019-07-01

## 2019-07-01 ENCOUNTER — APPOINTMENT (OUTPATIENT)
Dept: ORTHOPEDIC SURGERY | Facility: CLINIC | Age: 64
End: 2019-07-01
Payer: OTHER MISCELLANEOUS

## 2019-07-01 VITALS — WEIGHT: 227 LBS | HEIGHT: 69 IN | BODY MASS INDEX: 33.62 KG/M2

## 2019-07-01 PROCEDURE — 99213 OFFICE O/P EST LOW 20 MIN: CPT

## 2019-07-02 NOTE — HISTORY OF PRESENT ILLNESS
[de-identified] : Pt is a 65 y/o male employee of [employer] who presents for a followup visit involving the right knee.  He states that he was at work on 5/28/19 and felt sudden and immediate pain over the posterior knee.  He does not recall the mechanism of injury but he states that he was pulling a lot of weight that day. By the end of the day he was unable to walk.  He was seen by his PCP Dr Jones, 2 days later  who sent him to [place] ED to evaluate for DVT.  Doppler report returned negative for DVT. He had swelling in his leg as well.  He has pain when he walks and stands.  It is difficult to stand from sitting.  The swelling and pain are both active and have not resolved. An MRI of the knee was done and revealed a complex tear of the posterior root and posterior horn of the medial meniscus. He presents today in a knee hinge brace to review the results and further treatment. \par Worker's Compensation followup visit.

## 2019-07-02 NOTE — DISCUSSION/SUMMARY
[de-identified] : I referred the patient to Dr. Fernández for further evaluation of his condition.

## 2019-07-02 NOTE — PHYSICAL EXAM
[de-identified] : Patient is WDWN, alert, and in no acute distress. Breathing is unlabored. He is grossly oriented to person, place, and time. \par \par Right knee: There is medial joint line tenderness and tenderness to palpation over the posterior aspect. Edema present. No valgus or valgus instability present on provocative testing. Flexion and extension 5/5.\par Range of motion: Extension 0 °, flexion to 100 °. No crepitus.\par Tests and Signs: All tests for stability are normal. \par Strength: flexion and extension 5/5  [de-identified] : MRI right knee 06/28/2019: Complex tear of the posterior root and posterior horn of the medial meniscus. focal high-grade chondral defect n the weightbearing aspect of the medial femoral condyle measuring approximately 7 x 7 mm. Small area of high-grade chondrosis in the medial tibial plateau.

## 2019-07-02 NOTE — ADDENDUM
[FreeTextEntry1] : I, Mayte Lentz wrote this note acting as a scribe for Dr. Helio Ellison on Jul 01, 2019.

## 2019-07-07 ENCOUNTER — RX RENEWAL (OUTPATIENT)
Age: 64
End: 2019-07-07

## 2019-07-08 ENCOUNTER — RX RENEWAL (OUTPATIENT)
Age: 64
End: 2019-07-08

## 2019-07-11 ENCOUNTER — APPOINTMENT (OUTPATIENT)
Dept: ORTHOPEDIC SURGERY | Facility: CLINIC | Age: 64
End: 2019-07-11
Payer: OTHER MISCELLANEOUS

## 2019-07-11 VITALS — WEIGHT: 227 LBS | BODY MASS INDEX: 33.62 KG/M2 | HEIGHT: 69 IN

## 2019-07-11 PROCEDURE — 20610 DRAIN/INJ JOINT/BURSA W/O US: CPT | Mod: RT

## 2019-07-11 PROCEDURE — 99214 OFFICE O/P EST MOD 30 MIN: CPT | Mod: 25

## 2019-07-11 NOTE — PHYSICAL EXAM
[Normal RLE] : Right Lower Extremity: No scars, rashes, lesions, ulcers, skin intact [Normal LLE] : Left Lower Extremity: No scars, rashes, lesions, ulcers, skin intact [Normal] : No swelling, no edema, normal pedal pulses and normal temperature [Normal Touch] : sensation intact for touch [Obese] : obese [Poor Appearance] : well-appearing [Acute Distress] : not in acute distress [de-identified] : o XRays of the right knee were taken in the office on 6/13/2019, revealed:\par o Right Knee : AP, lateral, sunrise, and Lang views of the knee were obtained, there are no soft tissue abnormalities, no fractures, varus alignment, moderate medial compartment osteoarthritis. \par \par o An MRI of the right knee was obtained at [facility] [place] on 6/25/2019, revealed:\par 1. Complex tear of the posterior root and posterior horn of the medial meniscus. \par 2. Focal high-grade chondral defect in the weightbearing aspect of the medial femoral condyle measuring approximately 7 x 7 mm. Small area of high-grade chondrosis in the medial tibial plateau. \par 3. Moderate knee joint effusion. \par 4. Small popliteal cyst with fluid tracking along the medial aspect of the proximal lower leg. \par 5. Mild heterogeneous increased signal in the ACL, question degeneration or sequela of prior partial sprain.  [de-identified] : Right Lower Extremity\par o Knee :\par ¦ Inspection/Palpation : marked medial tenderness, swelling with 2+ effusion, no deformity, varus alignment \par ¦ Range of Motion : 0 - 110 degrees, no crepitus\par ¦ Stability : no valgus or varus instability present on provocative testing, Lachman’s Test (-)\par ¦ Strength : flexion and extension 4/5\par o Muscle Bulk : normal muscle bulk present\par o Skin : no erythema, no ecchymosis \par o Sensation : sensation to pin intact\par o Vascular Exam : no edema, no cyanosis, dorsalis pedis artery pulse 2+, posterior tibial artery pulse 2+ \par \par Left Lower Extremity\par o Knee :\par ¦ Strength : flexion and extension 5/5

## 2019-07-11 NOTE — HISTORY OF PRESENT ILLNESS
[de-identified] : Worker's Compensation Visit:\par 64 year old male presents for an evaluation of right knee pain that began on 5/28/2019 when he was injured at work while pulling a heavy object when he twisted his knee and noted pain the the posterior aspect of his knee, which was making it difficult for him to walk. On 5/30/2019 he was seen at [facility] and had a US Doppler Exam performed which was negative for DVT. XRays performed in the office on 6/13/2019 revealed moderate medial compartment osteoarthritis. On 6/25/2019 MRI of the knee was done and revealed a complex tear of the posterior root and posterior horn of the medial meniscus.\par \par Since his last visit: His pain is unchanged compared to his previous visit. He is still experiencing moderate pain and swelling about his knee that is constant in nature. The patient says his symptoms are exacerbated with walking, deep squatting, climbing stairs and ladders as well as getting up from a seated position. He also reports occasional episodes of instability where he feels as though his right knee is going to give out on him. \par \par Test or Referrals: Authorization for physical therapy of the right knee, medial  brace for the right knee, and a Monovisc injection of the right knee. \par Work Status: The patient is not working, 100% disabled\par Prognosis:The patients prognosis for recovery is good.

## 2019-07-11 NOTE — END OF VISIT
[FreeTextEntry3] : All medical record entries made by the Tobyibe were at my, Dr. Les Fernández, direction and personally dictated by me on 07/11/2019. I have reviewed the chart and agree that the record accurately reflects my personal performance of the history, physical exam, assessment and plan. I have also personally directed, reviewed, and agreed with the chart.

## 2019-07-11 NOTE — PROCEDURE
[de-identified] : At this point I recommended aspiration and therapeutic injection and under sterile precautions an injection of 2 cc 1% lidocaine with 0.5 cc of Kenalog and 0.5 cc of Dexamethasone- was placed into the joint of the Right knee without complication after 25 cc of clear synovial fluid was aspirated and after several minutes the patient felt significant relief.

## 2019-07-11 NOTE — ADDENDUM
[FreeTextEntry1] : I, Marleen Levy, acted solely as a scribe for Dr. Les Fernández on this date 07/11/2019.

## 2019-07-11 NOTE — DISCUSSION/SUMMARY
[de-identified] : The underlying pathophysiology was reviewed in great detail with the patient as well as the various treatment options, including ice, analgesics, NSAIDs, Physical therapy, steroid injections, right knee arthroscopic medial meniscal repair, medial  brace. \par \par The patient wishes to proceed with an ASPIRATION and INJECTION of the right knee. \par \par Prescriptions were provided for Physical Therapy, a medial  brace, and Meloxicam. \par \par Monovisc was ordered for the right knee.\par \par Requests were sent to workman's compensation. \par \par FU after injection is obtained.

## 2019-07-12 ENCOUNTER — MEDICATION RENEWAL (OUTPATIENT)
Age: 64
End: 2019-07-12

## 2019-07-13 ENCOUNTER — RX RENEWAL (OUTPATIENT)
Age: 64
End: 2019-07-13

## 2019-08-01 ENCOUNTER — APPOINTMENT (OUTPATIENT)
Dept: ORTHOPEDIC SURGERY | Facility: CLINIC | Age: 64
End: 2019-08-01
Payer: OTHER MISCELLANEOUS

## 2019-08-01 VITALS — HEIGHT: 69 IN | WEIGHT: 227 LBS | BODY MASS INDEX: 33.62 KG/M2

## 2019-08-01 PROCEDURE — 99214 OFFICE O/P EST MOD 30 MIN: CPT

## 2019-08-01 NOTE — HISTORY OF PRESENT ILLNESS
[de-identified] : Worker's Compensation Visit:\par 64 year old male presents for an evaluation of right knee pain that began on 5/28/2019 when he was injured at work while pulling a heavy object when he twisted his knee and noted pain the the posterior aspect of his knee, which was making it difficult for him to walk. On 5/30/2019 he was seen at [facility] and had a US Doppler Exam performed which was negative for DVT. XRays performed in the office on 6/13/2019 revealed moderate medial compartment osteoarthritis. On 6/25/2019 MRI of the knee was obtained and revealed a complex tear of the posterior root and posterior horn of the medial meniscus as well as a mild heterogeneous increased signal in the ACL.\par \par Since his last visit:  At his last visit on 7/11/2019 he underwent an aspiration and received a corticosteroid injection of the right knee, which temporarily alleviated his symptoms. His swelling has returned and he describes a constant fullness of his knee that is restricting his range of motion. He describes a severe throbbing pain about his knee that is constant in nature. His symptoms are exacerbated with walking, deep knee flexion, climbing stairs and ladders. He also reports occasional episodes of instability where he feels as though his right knee is going to give out on him. \par \par Test or Referrals: Authorization for physical therapy of the right knee, medial  brace for the right knee, and a Monovisc injection of the right knee. \par Work Status: The patient is not working, 100% disabled\par Prognosis:The patients prognosis for recovery is guarded without physical therapy and use of a medial  brace.

## 2019-08-01 NOTE — PHYSICAL EXAM
[Normal RLE] : Right Lower Extremity: No scars, rashes, lesions, ulcers, skin intact [Normal LLE] : Left Lower Extremity: No scars, rashes, lesions, ulcers, skin intact [Normal Touch] : sensation intact for touch [Normal] : No swelling, no edema, normal pedal pulses and normal temperature [Obese] : obese [Poor Appearance] : well-appearing [Acute Distress] : not in acute distress [de-identified] : Right Lower Extremity\par o Knee :\par ¦ Inspection/Palpation : marked medial joint line tenderness, swelling with 1-2+ effusion, no deformity, varus alignment \par ¦ Range of Motion : 0 - 110 degrees, no crepitus\par ¦ Stability : no valgus or varus instability present on provocative testing, Lachman’s Test (1+)\par ¦ Strength : flexion and extension 4/5\par o Muscle Bulk : normal muscle bulk present\par o Skin : no erythema, no ecchymosis \par o Sensation : sensation to pin intact\par o Vascular Exam : no edema, no cyanosis, dorsalis pedis artery pulse 2+, posterior tibial artery pulse 2+

## 2019-08-01 NOTE — END OF VISIT
[FreeTextEntry3] : All medical record entries made by the Tobyibreuben were at my, Dr. Les Fernández, direction and personally dictated by me on 08/01/2019. I have reviewed the chart and agree that the record accurately reflects my personal performance of the history, physical exam, assessment and plan. I have also personally directed, reviewed, and agreed with the chart.

## 2019-08-01 NOTE — ADDENDUM
[FreeTextEntry1] : I, Marleen Levy, acted solely as a scribe for Dr. Les Fernández on this date 08/01/2019.

## 2019-08-01 NOTE — DISCUSSION/SUMMARY
[de-identified] : The underlying pathophysiology was reviewed in great detail with the patient as well as the various treatment options, including ice, analgesics, NSAIDs, Physical therapy, steroid injections, right knee arthroscopic medial meniscal repair, medial  brace. \par \par Prescriptions were provided for Physical Therapy, a medial  brace, and Calcitonin-[place] nasal spray.\par \par Monovisc was ordered for the right knee.\par \par Requests were sent to workman's compensation. \par \par FU after injection is obtained.

## 2019-08-02 ENCOUNTER — APPOINTMENT (OUTPATIENT)
Dept: INTERNAL MEDICINE | Facility: CLINIC | Age: 64
End: 2019-08-02
Payer: COMMERCIAL

## 2019-08-02 VITALS — SYSTOLIC BLOOD PRESSURE: 126 MMHG | DIASTOLIC BLOOD PRESSURE: 76 MMHG

## 2019-08-02 VITALS — RESPIRATION RATE: 15 BRPM | HEART RATE: 74 BPM | WEIGHT: 226 LBS | HEIGHT: 69 IN | BODY MASS INDEX: 33.47 KG/M2

## 2019-08-02 PROCEDURE — 99215 OFFICE O/P EST HI 40 MIN: CPT

## 2019-08-02 RX ORDER — MELOXICAM 15 MG/1
15 TABLET ORAL
Qty: 30 | Refills: 0 | Status: DISCONTINUED | COMMUNITY
Start: 2019-06-13 | End: 2019-08-02

## 2019-08-02 NOTE — REVIEW OF SYSTEMS
[Nocturia] : nocturia [Impotence] : impotence [Joint Stiffness] : joint stiffness [Poor Libido] : poor libido [Muscle Pain] : muscle pain [Anxiety] : anxiety [Lower Ext Edema] : lower extremity edema [Joint Pain] : joint pain [Fever] : no fever [Chills] : no chills [Fatigue] : no fatigue [Hot Flashes] : no hot flashes [Night Sweats] : no night sweats [Discharge] : no discharge [Recent Change In Weight] : ~T no recent weight change [Redness] : no redness [Dryness] : no dryness [Pain] : no pain [Vision Problems] : no vision problems [Itching] : no itching [Earache] : no earache [Hearing Loss] : no hearing loss [Nosebleeds] : no nosebleeds [Nasal Discharge] : no nasal discharge [Postnasal Drip] : no postnasal drip [Sore Throat] : no sore throat [Hoarseness] : no hoarseness [Chest Pain] : no chest pain [Palpitations] : no palpitations [Claudication] : no  leg claudication [Orthopena] : no orthopnea [Paroysmal Nocturnal Dyspnea] : no paroysmal nocturnal dyspnea [Shortness Of Breath] : no shortness of breath [Wheezing] : no wheezing [Cough] : no cough [Dyspnea on Exertion] : not dyspnea on exertion [Constipation] : no constipation [Nausea] : no nausea [Abdominal Pain] : no abdominal pain [Diarrhea] : no diarrhea [Vomiting] : no vomiting [Heartburn] : no heartburn [Dysuria] : no dysuria [Melena] : no melena [Incontinence] : no incontinence [Hesitancy] : no hesitancy [Hematuria] : no hematuria [Frequency] : no frequency [Muscle Weakness] : no muscle weakness [Back Pain] : no back pain [Joint Swelling] : no joint swelling [Mole Changes] : no mole changes [Nail Changes] : no nail changes [Hair Changes] : no hair changes [Skin Rash] : no skin rash [Dizziness] : no dizziness [Headache] : no headache [Confusion] : no confusion [Fainting] : no fainting [Unsteady Walk] : no ataxia [Suicidal] : not suicidal [Insomnia] : no insomnia [Memory Loss] : no memory loss [Easy Bleeding] : no easy bleeding [Depression] : no depression [Swollen Glands] : no swollen glands [Easy Bruising] : no easy bruising [FreeTextEntry9] : tender swollen calf and swollen knee /right

## 2019-08-02 NOTE — ASSESSMENT
[FreeTextEntry1] : Physical exam shows a well-developed man in no acute distress blood pressure 126/76 height 5 feet 9 inches weight 226 pounds heart rate is 78 respiratory rate of 15 HEENT is unremarkable chest clear cardiovascular exam showed a normal S1 and S2 with no extra heart sounds or murmurs abdomen was soft extremities there was significant swelling of his right knee with some mild pitting edema of the ankle neurologic exam was nonfocal of note has had a Doppler of his legs are no phlebitis her options presented to him at present or nonsurgical involving physical therapy anti-inflammatories and a brace now believe that his knee was bothering him much with whatever was seen on the x-ray and it seems that the acute injury relates to a meniscal tear is up to date with his ophthalmologist a dermatologist

## 2019-08-02 NOTE — PHYSICAL EXAM
[No Acute Distress] : no acute distress [Well Nourished] : well nourished [Well Developed] : well developed [Well-Appearing] : well-appearing [Normal Sclera/Conjunctiva] : normal sclera/conjunctiva [Normal Voice/Communication] : normal voice/communication [PERRL] : pupils equal round and reactive to light [EOMI] : extraocular movements intact [Normal Outer Ear/Nose] : the outer ears and nose were normal in appearance [Normal Oropharynx] : the oropharynx was normal [Normal TMs] : both tympanic membranes were normal [Normal Nasal Mucosa] : the nasal mucosa was normal [No JVD] : no jugular venous distention [No Lymphadenopathy] : no lymphadenopathy [Supple] : supple [Thyroid Normal, No Nodules] : the thyroid was normal and there were no nodules present [No Respiratory Distress] : no respiratory distress  [No Accessory Muscle Use] : no accessory muscle use [Clear to Auscultation] : lungs were clear to auscultation bilaterally [Normal Percussion] : the chest was normal to percussion [Normal Rate] : normal rate  [Regular Rhythm] : with a regular rhythm [Normal S1, S2] : normal S1 and S2 [No Murmur] : no murmur heard [No Carotid Bruits] : no carotid bruits [No Abdominal Bruit] : a ~M bruit was not heard ~T in the abdomen [No Varicosities] : no varicosities [Pedal Pulses Present] : the pedal pulses are present [No Edema] : there was no peripheral edema [No Extremity Clubbing/Cyanosis] : no extremity clubbing/cyanosis [No Palpable Aorta] : no palpable aorta [Declined Breast Exam] : declined breast exam  [Non Tender] : non-tender [Soft] : abdomen soft [Non-distended] : non-distended [No Masses] : no abdominal mass palpated [Normal Bowel Sounds] : normal bowel sounds [No HSM] : no HSM [No Hernias] : no hernias [Declined Rectal Exam] : declined rectal exam [Normal Axillary Nodes] : no axillary lymphadenopathy [Normal Supraclavicular Nodes] : no supraclavicular lymphadenopathy [Normal Posterior Cervical Nodes] : no posterior cervical lymphadenopathy [Normal Anterior Cervical Nodes] : no anterior cervical lymphadenopathy [Normal Femoral Nodes] : no femoral lymphadenopathy [Normal Inguinal Nodes] : no inguinal lymphadenopathy [No CVA Tenderness] : no CVA  tenderness [No Spinal Tenderness] : no spinal tenderness [Grossly Normal Strength/Tone] : grossly normal strength/tone [No Rash] : no rash [No Skin Lesions] : no skin lesions [Coordination Grossly Intact] : coordination grossly intact [No Focal Deficits] : no focal deficits [Normal Gait] : normal gait [Deep Tendon Reflexes (DTR)] : deep tendon reflexes were 2+ and symmetric [Speech Grossly Normal] : speech grossly normal [Memory Grossly Normal] : memory grossly normal [Alert and Oriented x3] : oriented to person, place, and time [Normal Affect] : the affect was normal [Normal Insight/Judgement] : insight and judgment were intact [Normal Mood] : the mood was normal [Kyphosis] : no kyphosis [Scoliosis] : no scoliosis [Acne] : no acne [de-identified] : right knee

## 2019-08-02 NOTE — HISTORY OF PRESENT ILLNESS
[de-identified] : 64-year-old man comes to the office for followup with a history of bladder cancer hypertension erectile dysfunction recent tear of his medial meniscus of his right knee associated with arthritis of his right is currently in a brace actively followed by orthopedist at present being treated conservatively with anti-inflammatory medicines physical therapy and a brace he denies temperature chills sweats or myalgias no headaches sinus congestion sore throat cough wheezing pleurisy chest pain shortness of breath exertional dyspnea lightheadedness palpitations dizziness vertigo or syncope he denies abdominal pain nausea vomiting diarrhea constipation bright red blood per rectum or black stools appetite has been good his weight is stable his main complaints orthopedically revolved to the swelling and pain in his right knee has had no recent skin rashes tinnitus no problems with erections gets up occasionally at night to urinate denies gross hematuria or dysuria [FreeTextEntry1] : Comes to the office for followup to review his medications and discuss his overall health chief complaint at present revolves around pain and discomfort involving his right knee

## 2019-08-02 NOTE — HEALTH RISK ASSESSMENT
[Yes] : Yes [No falls in past year] : Patient reported no falls in the past year [0] : 1) Little interest or pleasure doing things: Not at all (0) [[id number]] : 0 [] : No

## 2019-08-29 ENCOUNTER — APPOINTMENT (OUTPATIENT)
Dept: ORTHOPEDIC SURGERY | Facility: CLINIC | Age: 64
End: 2019-08-29
Payer: OTHER MISCELLANEOUS

## 2019-08-29 VITALS — BODY MASS INDEX: 33.47 KG/M2 | WEIGHT: 226 LBS | HEIGHT: 69 IN

## 2019-08-29 PROCEDURE — 20610 DRAIN/INJ JOINT/BURSA W/O US: CPT | Mod: RT

## 2019-08-29 PROCEDURE — 99214 OFFICE O/P EST MOD 30 MIN: CPT | Mod: 25

## 2019-08-29 NOTE — PROCEDURE
[de-identified] : At this point I recommended a therapeutic injection and under sterile precautions an injection of 3 cc of Gel-one, was placed into the joint of the [Right] knee without complication after 15 cc of clear synovial fluid was aspirated and after several minutes the patient felt significant relief.\par \par

## 2019-08-29 NOTE — PHYSICAL EXAM
[Normal LLE] : Left Lower Extremity: No scars, rashes, lesions, ulcers, skin intact [Normal RLE] : Right Lower Extremity: No scars, rashes, lesions, ulcers, skin intact [Normal Touch] : sensation intact for touch [Normal] : No swelling, no edema, normal pedal pulses and normal temperature [Obese] : obese [Poor Appearance] : well-appearing [Acute Distress] : not in acute distress [de-identified] : Right Lower Extremity\par o Knee :\par ¦ Inspection/Palpation : marked medial joint line tenderness, swelling with 2+ effusion, no deformity, varus alignment \par ¦ Range of Motion : 0 - 115 degrees, no crepitus\par ¦ Stability : no valgus or varus instability present on provocative testing, Lachman’s Test (1+)\par ¦ Strength : flexion and extension 5/5\par o Muscle Bulk : normal muscle bulk present\par o Skin : no erythema, no ecchymosis \par o Sensation : sensation to pin intact\par o Vascular Exam : no edema, no cyanosis, dorsalis pedis artery pulse 2+, posterior tibial artery pulse 2+  [de-identified] : No new images were taken in the office today.\par

## 2019-08-29 NOTE — ADDENDUM
[FreeTextEntry1] : I, Bradley Ash, acted solely as a scribe for Dr. Fernández on 08/29/2019  .\par  \par All medical record entries made by the scribe were at my, Dr. Fernández, direction and personally dictated by me on 08/29/2019. I have reviewed the chart and agree that the record accurately reflects my personal performance of the history, physical exam, assessment and plan. I have also personally directed, reviewed, and agreed with\par

## 2019-08-29 NOTE — HISTORY OF PRESENT ILLNESS
[de-identified] : Worker's Compensation Visit:\par 64 year old male presents for an evaluation of right knee pain that began on 5/28/2019 when he was injured at work while pulling a heavy object when he twisted his knee and noted pain the the posterior aspect of his knee, which was making it difficult for him to walk. On 5/30/2019 he was seen at [facility] and had a US Doppler Exam performed which was negative for DVT. XRays performed in the office on 6/13/2019 revealed moderate medial compartment osteoarthritis. On 6/25/2019 MRI of the knee was obtained and revealed a complex tear of the posterior root and posterior horn of the medial meniscus as well as a mild heterogeneous increased signal in the ACL.\par \par Since his last visit:  At his last visit on 7/11/2019 he underwent an aspiration and received a corticosteroid injection of the right knee, which temporarily alleviated his symptoms. His swelling has returned and he describes a constant fullness of his knee that is restricting his range of motion. He describes a severe throbbing pain about his knee that is constant in nature. His symptoms are exacerbated with walking, deep knee flexion, climbing stairs and ladders. He also reports occasional episodes of instability where he feels as though his right knee is going to give out on him. \par \par Test or Referrals: No new authorization is requested\par Work Status: The patient is not working, 100% disabled\par Prognosis:The patients prognosis for recovery is guarded

## 2019-08-29 NOTE — DISCUSSION/SUMMARY
[de-identified] : The underlying pathophysiology was reviewed in great detail with the patient as well as the various treatment options, including ice, analgesics, NSAIDs, Physical therapy, steroid injections, right knee arthroscopic medial meniscal repair, medial  brace. \par \par \par Gel One injection today was tolerated well. \par \par Continue PT and use of  brace\par \par FU in 4 weeks

## 2019-08-29 NOTE — PHYSICAL EXAM
[Normal LLE] : Left Lower Extremity: No scars, rashes, lesions, ulcers, skin intact [Normal RLE] : Right Lower Extremity: No scars, rashes, lesions, ulcers, skin intact [Normal] : No swelling, no edema, normal pedal pulses and normal temperature [Normal Touch] : sensation intact for touch [Obese] : obese [Poor Appearance] : well-appearing [Acute Distress] : not in acute distress [de-identified] : Right Lower Extremity\par o Knee :\par ¦ Inspection/Palpation : marked medial joint line tenderness, swelling with 2+ effusion, no deformity, varus alignment \par ¦ Range of Motion : 0 - 115 degrees, no crepitus\par ¦ Stability : no valgus or varus instability present on provocative testing, Lachman’s Test (1+)\par ¦ Strength : flexion and extension 5/5\par o Muscle Bulk : normal muscle bulk present\par o Skin : no erythema, no ecchymosis \par o Sensation : sensation to pin intact\par o Vascular Exam : no edema, no cyanosis, dorsalis pedis artery pulse 2+, posterior tibial artery pulse 2+  [de-identified] : No new images were taken in the office today.\par

## 2019-08-29 NOTE — DISCUSSION/SUMMARY
[de-identified] : The underlying pathophysiology was reviewed in great detail with the patient as well as the various treatment options, including ice, analgesics, NSAIDs, Physical therapy, steroid injections, right knee arthroscopic medial meniscal repair, medial  brace. \par \par \par Gel One injection today was tolerated well. \par \par Continue PT and use of  brace\par \par FU in 4 weeks

## 2019-08-29 NOTE — PROCEDURE
[de-identified] : At this point I recommended a therapeutic injection and under sterile precautions an injection of 3 cc of Gel-one, was placed into the joint of the [Right] knee without complication after 15 cc of clear synovial fluid was aspirated and after several minutes the patient felt significant relief.\par \par

## 2019-08-29 NOTE — HISTORY OF PRESENT ILLNESS
[de-identified] : Worker's Compensation Visit:\par 64 year old male presents for an evaluation of right knee pain that began on 5/28/2019 when he was injured at work while pulling a heavy object when he twisted his knee and noted pain the the posterior aspect of his knee, which was making it difficult for him to walk. On 5/30/2019 he was seen at [facility] and had a US Doppler Exam performed which was negative for DVT. XRays performed in the office on 6/13/2019 revealed moderate medial compartment osteoarthritis. On 6/25/2019 MRI of the knee was obtained and revealed a complex tear of the posterior root and posterior horn of the medial meniscus as well as a mild heterogeneous increased signal in the ACL.\par \par Since his last visit:  At his last visit on 7/11/2019 he underwent an aspiration and received a corticosteroid injection of the right knee, which temporarily alleviated his symptoms. His swelling has returned and he describes a constant fullness of his knee that is restricting his range of motion. He describes a severe throbbing pain about his knee that is constant in nature. His symptoms are exacerbated with walking, deep knee flexion, climbing stairs and ladders. He also reports occasional episodes of instability where he feels as though his right knee is going to give out on him. \par \par Test or Referrals: No new authorization is requested\par Work Status: The patient is not working, 100% disabled\par Prognosis:The patients prognosis for recovery is guarded

## 2019-09-04 ENCOUNTER — RX RENEWAL (OUTPATIENT)
Age: 64
End: 2019-09-04

## 2019-09-06 ENCOUNTER — RX RENEWAL (OUTPATIENT)
Age: 64
End: 2019-09-06

## 2019-09-26 ENCOUNTER — APPOINTMENT (OUTPATIENT)
Dept: ORTHOPEDIC SURGERY | Facility: CLINIC | Age: 64
End: 2019-09-26
Payer: OTHER MISCELLANEOUS

## 2019-09-26 PROCEDURE — 99214 OFFICE O/P EST MOD 30 MIN: CPT

## 2019-09-26 PROCEDURE — 73564 X-RAY EXAM KNEE 4 OR MORE: CPT | Mod: RT

## 2019-09-26 NOTE — ADDENDUM
[FreeTextEntry1] : I, Ana Alejandre, acted solely as a scribe for Dr. Les Fernández on this date 09/26/2019.

## 2019-09-26 NOTE — REASON FOR VISIT
[Follow-Up Visit] : a follow-up visit for [Knee Pain] : knee pain [Worker's Compensation] : This visit is related to worker's compensation

## 2019-09-26 NOTE — END OF VISIT
[FreeTextEntry3] : All medical record entries made by the Tobyibreuben were at my, Dr. Les Fernández, direction and personally dictated by me on 09/26/2019. I have reviewed the chart and agree that the record accurately reflects my personal performance of the history, physical exam, assessment and plan. I have also personally directed, reviewed, and agreed with the chart.

## 2019-09-26 NOTE — HISTORY OF PRESENT ILLNESS
[de-identified] : Worker's Compensation Visit:\par 64 year old male presents for an evaluation of right knee pain that began on 5/28/2019 when he was injured at work while pulling a heavy object when he twisted his knee and noted pain the the posterior aspect of his knee, which was making it difficult for him to walk. On 5/30/2019 he was seen at [facility] and had a US Doppler Exam performed which was negative for DVT. XRays performed in the office on 6/13/2019 revealed moderate medial compartment osteoarthritis. On 6/25/2019 MRI of the knee was obtained and revealed a complex tear of the posterior root and posterior horn of the medial meniscus as well as a mild heterogeneous increased signal in the ACL.\par \par Since his last visit: At his last visit on 8/29/2019 the patient received a Gel-One injection of the left knee which minimally alleviated his symptoms. He has been attending physical therapy and notes improvements in strength and ROM, but notes that he continues to experience a throbbing pain about his knee that is constant in nature. His symptoms are exacerbated with walking, deep bending, climbing stairs, and with extended periods of driving. He has been utilizing a medial  brace, but he notes that he continues to experience "buckling" of his right knee. He also notes "cracking" of the knee upon ROM. The patient has no other complaints at this time. \par \par Test or Referrals: No authorizations are needed at this time.\par Work Status: The patient is not working, 100% disabled.\par Prognosis:The patients prognosis for recovery is guarded .

## 2019-09-26 NOTE — PHYSICAL EXAM
[Normal RLE] : Right Lower Extremity: No scars, rashes, lesions, ulcers, skin intact [Normal LLE] : Left Lower Extremity: No scars, rashes, lesions, ulcers, skin intact [Normal Touch] : sensation intact for touch [Normal] : No swelling, no edema, normal pedal pulses and normal temperature [Obese] : obese [Poor Appearance] : well-appearing [Acute Distress] : not in acute distress [de-identified] : Right Lower Extremity\par o Knee :\par ¦ Inspection/Palpation : moderate medial joint line tenderness, swelling with 1-2+ effusion, no deformity, varus alignment \par ¦ Range of Motion : 0 - 120 degrees, no crepitus\par ¦ Stability : no valgus or varus instability present on provocative testing, Lachman’s Test (1+)\par ¦ Strength : flexion and extension 5/5\par o Muscle Bulk : normal muscle bulk present\par o Skin : no erythema, no ecchymosis \par o Sensation : sensation to pin intact\par o Vascular Exam : no edema, no cyanosis, dorsalis pedis artery pulse 2+, posterior tibial artery pulse 2+  [de-identified] : o Right Knee : AP, lateral, sunrise, and Lang views of the knee were obtained, there are no soft tissue abnormalities, no fractures, alignment is normal, severe medial compartment osteoarthritis with bone on bone apposition, normal bone density, no bony lesions.

## 2019-09-26 NOTE — DISCUSSION/SUMMARY
[de-identified] : The underlying pathophysiology was reviewed in great detail with the patient as well as the various treatment options, including ice, analgesics, NSAIDs, Physical therapy, steroid injections, partial vs. TKA\par . \par The patient wishes to proceed with SURGICAL INTERVENTION at this time. The risks and benefits of a RIGHT TOTAL KNEE ARTHROPLASTY were discussed in great detail today, including but not limited to bleeding, infection, nerve injury, DVT, allergy to the anesthetic or to the implants, persistent pain, stiffness, scarring, swelling or deformity. A request was sent to workman's compensation.

## 2019-10-10 ENCOUNTER — APPOINTMENT (OUTPATIENT)
Dept: ORTHOPEDIC SURGERY | Facility: CLINIC | Age: 64
End: 2019-10-10
Payer: COMMERCIAL

## 2019-10-10 PROCEDURE — 99214 OFFICE O/P EST MOD 30 MIN: CPT

## 2019-10-10 NOTE — ADDENDUM
[FreeTextEntry1] : I, Ana Alejandre, acted solely as a scribe for Dr. Les Fernández on this date 10/10/2019.

## 2019-10-10 NOTE — END OF VISIT
[FreeTextEntry3] : All medical record entries made by the Scribe were at my, Dr. Les Fernández, direction and personally dictated by me on 10/10/2019. I have reviewed the chart and agree that the record accurately reflects my personal performance of the history, physical exam, assessment and plan. I have also personally directed, reviewed, and agreed with the chart.

## 2019-10-10 NOTE — HISTORY OF PRESENT ILLNESS
[de-identified] : Worker's Compensation Visit:\par 64 year old male presents for an evaluation of right knee pain that began on 5/28/2019 when he was injured at work while pulling a heavy object when he twisted his knee and noted pain the the posterior aspect of his knee, which was making it difficult for him to walk. On 5/30/2019 he was seen at [place] [facility] and had a US Doppler Exam performed which was negative for DVT. XRays performed in the office on 6/13/2019 revealed moderate medial compartment osteoarthritis. On 6/25/2019 MRI of the knee was obtained and revealed a complex tear of the posterior root and posterior horn of the medial meniscus as well as a mild heterogeneous increased signal in the ACL.\par \par Since his last visit: The patient has been attending physical therapy and notes improvements in strength and ROM. He repots that he continues to experience a throbbing pain about his knee that is constant in nature. His symptoms are exacerbated with walking, deep bending, climbing stairs, and with extended periods of driving. He has been utilizing a medial  brace for added support and stability. The patient has no other complaints at this time. \par \par Test or Referrals: Authorization for a right total knee arthroplasty.\par Work Status: The patient is not working, but may return to sedentary work at 50% disability.\par Prognosis:The patients prognosis for recovery is guarded .

## 2019-10-10 NOTE — DISCUSSION/SUMMARY
[de-identified] : The underlying pathophysiology was reviewed in great detail with the patient as well as the various treatment options, including ice, analgesics, NSAIDs, Physical therapy, steroid injections, partial vs. TKA\par . \par The patient wishes to proceed with SURGICAL INTERVENTION at this time. The risks and benefits of a RIGHT TOTAL KNEE ARTHROPLASTY were discussed in great detail today, including but not limited to bleeding, infection, nerve injury, DVT, allergy to the anesthetic or to the implants, persistent pain, stiffness, scarring, swelling or deformity. A request was sent to workman's compensation. \par \par A note was provided stating that he is able to return to sedentary work.

## 2019-10-10 NOTE — PHYSICAL EXAM
[Normal LLE] : Left Lower Extremity: No scars, rashes, lesions, ulcers, skin intact [Normal RLE] : Right Lower Extremity: No scars, rashes, lesions, ulcers, skin intact [Normal Touch] : sensation intact for touch [Normal] : No swelling, no edema, normal pedal pulses and normal temperature [Obese] : obese [Poor Appearance] : well-appearing [Acute Distress] : not in acute distress [de-identified] : Right Lower Extremity\par o Knee :\par ¦ Inspection/Palpation : medial joint line tenderness, trace effusion, no deformity, marked varus alignment \par ¦ Range of Motion : 0 - 115 degrees, no crepitus\par ¦ Stability : no valgus or varus instability present on provocative testing, Lachman’s Test (-)\par ¦ Strength : flexion and extension 5/5\par o Muscle Bulk : normal muscle bulk present\par o Skin : no erythema, no ecchymosis \par o Sensation : sensation to pin intact\par o Vascular Exam : no edema, no cyanosis, dorsalis pedis artery pulse 2+, posterior tibial artery pulse 2+

## 2019-10-18 ENCOUNTER — RX RENEWAL (OUTPATIENT)
Age: 64
End: 2019-10-18

## 2019-11-25 RX ORDER — ASPIRIN/CALCIUM CARB/MAGNESIUM 324 MG
0 TABLET ORAL
Qty: 0 | Refills: 0 | DISCHARGE

## 2019-11-26 ENCOUNTER — OUTPATIENT (OUTPATIENT)
Dept: OUTPATIENT SERVICES | Facility: HOSPITAL | Age: 64
LOS: 1 days | End: 2019-11-26
Payer: COMMERCIAL

## 2019-11-26 VITALS
RESPIRATION RATE: 14 BRPM | DIASTOLIC BLOOD PRESSURE: 66 MMHG | HEART RATE: 82 BPM | SYSTOLIC BLOOD PRESSURE: 116 MMHG | OXYGEN SATURATION: 99 % | TEMPERATURE: 98 F | WEIGHT: 235.89 LBS | HEIGHT: 67 IN

## 2019-11-26 DIAGNOSIS — Z01.818 ENCOUNTER FOR OTHER PREPROCEDURAL EXAMINATION: ICD-10-CM

## 2019-11-26 DIAGNOSIS — M19.90 UNSPECIFIED OSTEOARTHRITIS, UNSPECIFIED SITE: ICD-10-CM

## 2019-11-26 DIAGNOSIS — R06.83 SNORING: ICD-10-CM

## 2019-11-26 DIAGNOSIS — M17.11 UNILATERAL PRIMARY OSTEOARTHRITIS, RIGHT KNEE: ICD-10-CM

## 2019-11-26 DIAGNOSIS — M19.92 POST-TRAUMATIC OSTEOARTHRITIS, UNSPECIFIED SITE: ICD-10-CM

## 2019-11-26 DIAGNOSIS — Z98.890 OTHER SPECIFIED POSTPROCEDURAL STATES: Chronic | ICD-10-CM

## 2019-11-26 DIAGNOSIS — R94.31 ABNORMAL ELECTROCARDIOGRAM [ECG] [EKG]: ICD-10-CM

## 2019-11-26 DIAGNOSIS — Z90.49 ACQUIRED ABSENCE OF OTHER SPECIFIED PARTS OF DIGESTIVE TRACT: Chronic | ICD-10-CM

## 2019-11-26 LAB
ALBUMIN SERPL ELPH-MCNC: 4.5 G/DL — SIGNIFICANT CHANGE UP (ref 3.3–5)
ALP SERPL-CCNC: 98 U/L — SIGNIFICANT CHANGE UP (ref 30–120)
ALT FLD-CCNC: 73 U/L DA — HIGH (ref 10–60)
ANION GAP SERPL CALC-SCNC: 7 MMOL/L — SIGNIFICANT CHANGE UP (ref 5–17)
APTT BLD: 31.6 SEC — SIGNIFICANT CHANGE UP (ref 28.5–37)
AST SERPL-CCNC: 35 U/L — SIGNIFICANT CHANGE UP (ref 10–40)
BILIRUB SERPL-MCNC: 1.8 MG/DL — HIGH (ref 0.2–1.2)
BLD GP AB SCN SERPL QL: SIGNIFICANT CHANGE UP
BUN SERPL-MCNC: 15 MG/DL — SIGNIFICANT CHANGE UP (ref 7–23)
CALCIUM SERPL-MCNC: 9.2 MG/DL — SIGNIFICANT CHANGE UP (ref 8.4–10.5)
CHLORIDE SERPL-SCNC: 100 MMOL/L — SIGNIFICANT CHANGE UP (ref 96–108)
CO2 SERPL-SCNC: 33 MMOL/L — HIGH (ref 22–31)
CREAT SERPL-MCNC: 1.03 MG/DL — SIGNIFICANT CHANGE UP (ref 0.5–1.3)
GLUCOSE SERPL-MCNC: 108 MG/DL — HIGH (ref 70–99)
HCT VFR BLD CALC: 43.1 % — SIGNIFICANT CHANGE UP (ref 39–50)
HGB BLD-MCNC: 15.1 G/DL — SIGNIFICANT CHANGE UP (ref 13–17)
INR BLD: 1.05 RATIO — SIGNIFICANT CHANGE UP (ref 0.88–1.16)
MCHC RBC-ENTMCNC: 31.5 PG — SIGNIFICANT CHANGE UP (ref 27–34)
MCHC RBC-ENTMCNC: 35 GM/DL — SIGNIFICANT CHANGE UP (ref 32–36)
MCV RBC AUTO: 90 FL — SIGNIFICANT CHANGE UP (ref 80–100)
NRBC # BLD: 0 /100 WBCS — SIGNIFICANT CHANGE UP (ref 0–0)
PLATELET # BLD AUTO: 255 K/UL — SIGNIFICANT CHANGE UP (ref 150–400)
POTASSIUM SERPL-MCNC: 3.9 MMOL/L — SIGNIFICANT CHANGE UP (ref 3.5–5.3)
POTASSIUM SERPL-SCNC: 3.9 MMOL/L — SIGNIFICANT CHANGE UP (ref 3.5–5.3)
PROT SERPL-MCNC: 7.3 G/DL — SIGNIFICANT CHANGE UP (ref 6–8.3)
PROTHROM AB SERPL-ACNC: 11.5 SEC — SIGNIFICANT CHANGE UP (ref 10–12.9)
RBC # BLD: 4.79 M/UL — SIGNIFICANT CHANGE UP (ref 4.2–5.8)
RBC # FLD: 12.7 % — SIGNIFICANT CHANGE UP (ref 10.3–14.5)
SODIUM SERPL-SCNC: 140 MMOL/L — SIGNIFICANT CHANGE UP (ref 135–145)
WBC # BLD: 7.68 K/UL — SIGNIFICANT CHANGE UP (ref 3.8–10.5)
WBC # FLD AUTO: 7.68 K/UL — SIGNIFICANT CHANGE UP (ref 3.8–10.5)

## 2019-11-26 PROCEDURE — 87641 MR-STAPH DNA AMP PROBE: CPT

## 2019-11-26 PROCEDURE — 85610 PROTHROMBIN TIME: CPT

## 2019-11-26 PROCEDURE — 80053 COMPREHEN METABOLIC PANEL: CPT

## 2019-11-26 PROCEDURE — 85027 COMPLETE CBC AUTOMATED: CPT

## 2019-11-26 PROCEDURE — 86900 BLOOD TYPING SEROLOGIC ABO: CPT

## 2019-11-26 PROCEDURE — 87640 STAPH A DNA AMP PROBE: CPT

## 2019-11-26 PROCEDURE — 93005 ELECTROCARDIOGRAM TRACING: CPT

## 2019-11-26 PROCEDURE — 85730 THROMBOPLASTIN TIME PARTIAL: CPT

## 2019-11-26 PROCEDURE — G0463: CPT

## 2019-11-26 PROCEDURE — 36415 COLL VENOUS BLD VENIPUNCTURE: CPT

## 2019-11-26 PROCEDURE — 86850 RBC ANTIBODY SCREEN: CPT

## 2019-11-26 PROCEDURE — 93010 ELECTROCARDIOGRAM REPORT: CPT

## 2019-11-26 PROCEDURE — 86901 BLOOD TYPING SEROLOGIC RH(D): CPT

## 2019-11-26 RX ORDER — MELOXICAM 15 MG/1
1 TABLET ORAL
Qty: 0 | Refills: 0 | DISCHARGE

## 2019-11-26 NOTE — H&P PST ADULT - NSICDXPASTMEDICALHX_GEN_ALL_CORE_FT
PAST MEDICAL HISTORY:  Bladder polyp 2010 being monitored by urologist    High cholesterol     HTN (hypertension)     Mini stroke left eye mini stroke 2018  secondary to uncontrolled HTN,  f/u ophthalmologist , eye injections every 10 weeks and laser Rx    Osteoarthritis of right knee PAST MEDICAL HISTORY:  Bladder polyp 2010 being monitored by urologist    High cholesterol     HTN (hypertension)     Mini stroke left eye mini stroke 2018  secondary to uncontrolled HTN,  Monitored by ophthalmologist , eye injections every 10 weeks and laser Rx    Osteoarthritis of right knee

## 2019-11-26 NOTE — H&P PST ADULT - NSICDXPROBLEM_GEN_ALL_CORE_FT
PROBLEM DIAGNOSES  Problem: Snoring  Assessment and Plan: Stop bang score 5   CASH precautions   Referred to PCP     Problem: Abnormal EKG  Assessment and Plan: Abnormal EKG ; patent referred to cardiologist   stress done 2018 attached in chart     Problem: Osteoarthritis  Assessment and Plan: Right knee replacement   Medical clearance   Pre op instructions     Problem: Traumatic osteoarthritis  Assessment and Plan: PROBLEM DIAGNOSES  Problem: Snoring  Assessment and Plan: Stop bang score 4  CASH precautions   Referred to PCP     Problem: Abnormal EKG  Assessment and Plan: Abnormal EKG ; patent referred to cardiologist   stress done 2018 attached in chart     Problem: Osteoarthritis  Assessment and Plan: Right knee replacement   Medical clearance   Pre op instructions     Problem: Traumatic osteoarthritis  Assessment and Plan:

## 2019-11-26 NOTE — H&P PST ADULT - NSICDXFAMILYHX_GEN_ALL_CORE_FT
FAMILY HISTORY:  Father  Still living? No  Family history of lung disease, Age at diagnosis: Age Unknown

## 2019-11-26 NOTE — H&P PST ADULT - NSICDXPASTSURGICALHX_GEN_ALL_CORE_FT
PAST SURGICAL HISTORY:  H/O hernia repair bilateral inguinal hernia 2018    History of cholecystectomy 2016    S/P reconstruction procedure left arm reconstruction surgery s/p laceration of left forearm with powersaw  2010

## 2019-11-26 NOTE — H&P PST ADULT - MUSCULOSKELETAL COMMENTS
Right knee pain traumatic arthritis s/p work injury Right knee on brace , tender on palpation medial aspect of knee

## 2019-11-26 NOTE — H&P PST ADULT - HISTORY OF PRESENT ILLNESS
This is a 65 y/o male who presents with 6 month history of right knee pain and difficulty walking. patient states he in=jured his knee at work on May, 28th 2019 and MRI revealed meiscus tear and severe arthritis This is a 63 y/o male who presents with 6 month history of right knee pain and difficulty walking. patient states he injured his knee at work on May, 28th 2019 and MRI revealed meiscus tear and severe arthritis ,. he has been experiencing constant pain since the injury . Using knee brace .scheduled for right knee replacement

## 2019-11-26 NOTE — H&P PST ADULT - NSANTHOSAYNRD_GEN_A_CORE
No. CASH screening performed.  STOP BANG Legend: 0-2 = LOW Risk; 3-4 = INTERMEDIATE Risk; 5-8 = HIGH Risk

## 2019-11-27 LAB
MRSA PCR RESULT.: SIGNIFICANT CHANGE UP
S AUREUS DNA NOSE QL NAA+PROBE: SIGNIFICANT CHANGE UP

## 2019-12-05 ENCOUNTER — APPOINTMENT (OUTPATIENT)
Dept: CARDIOLOGY | Facility: CLINIC | Age: 64
End: 2019-12-05
Payer: COMMERCIAL

## 2019-12-05 ENCOUNTER — APPOINTMENT (OUTPATIENT)
Dept: INTERNAL MEDICINE | Facility: CLINIC | Age: 64
End: 2019-12-05
Payer: COMMERCIAL

## 2019-12-05 ENCOUNTER — NON-APPOINTMENT (OUTPATIENT)
Age: 64
End: 2019-12-05

## 2019-12-05 VITALS
HEIGHT: 69 IN | BODY MASS INDEX: 34.8 KG/M2 | WEIGHT: 235 LBS | TEMPERATURE: 97.9 F | HEART RATE: 102 BPM | SYSTOLIC BLOOD PRESSURE: 108 MMHG | RESPIRATION RATE: 15 BRPM | OXYGEN SATURATION: 97 % | DIASTOLIC BLOOD PRESSURE: 68 MMHG

## 2019-12-05 VITALS — SYSTOLIC BLOOD PRESSURE: 110 MMHG | DIASTOLIC BLOOD PRESSURE: 60 MMHG

## 2019-12-05 VITALS
BODY MASS INDEX: 34.8 KG/M2 | RESPIRATION RATE: 15 BRPM | WEIGHT: 235 LBS | DIASTOLIC BLOOD PRESSURE: 68 MMHG | SYSTOLIC BLOOD PRESSURE: 108 MMHG | HEIGHT: 69 IN | TEMPERATURE: 97.9 F | HEART RATE: 102 BPM

## 2019-12-05 VITALS — WEIGHT: 235 LBS | BODY MASS INDEX: 34.8 KG/M2 | HEIGHT: 69 IN

## 2019-12-05 VITALS
WEIGHT: 232 LBS | HEART RATE: 95 BPM | SYSTOLIC BLOOD PRESSURE: 123 MMHG | DIASTOLIC BLOOD PRESSURE: 75 MMHG | OXYGEN SATURATION: 98 % | RESPIRATION RATE: 16 BRPM | HEIGHT: 69 IN | BODY MASS INDEX: 34.36 KG/M2

## 2019-12-05 DIAGNOSIS — R53.83 OTHER FATIGUE: ICD-10-CM

## 2019-12-05 PROCEDURE — 99245 OFF/OP CONSLTJ NEW/EST HI 55: CPT

## 2019-12-05 PROCEDURE — 93000 ELECTROCARDIOGRAM COMPLETE: CPT

## 2019-12-05 PROCEDURE — 99215 OFFICE O/P EST HI 40 MIN: CPT

## 2019-12-05 RX ORDER — MELOXICAM 7.5 MG/1
7.5 TABLET ORAL
Qty: 30 | Refills: 1 | Status: DISCONTINUED | COMMUNITY
Start: 2019-07-11 | End: 2019-12-05

## 2019-12-05 RX ORDER — ASPIRIN 81 MG/1
81 TABLET, CHEWABLE ORAL
Refills: 0 | Status: DISCONTINUED | COMMUNITY
Start: 2019-02-27 | End: 2019-12-05

## 2019-12-05 NOTE — PHYSICAL EXAM
[General Appearance - Well Developed] : well developed [Normal Appearance] : normal appearance [Well Groomed] : well groomed [General Appearance - Well Nourished] : well nourished [No Deformities] : no deformities [General Appearance - In No Acute Distress] : no acute distress [Normal Conjunctiva] : the conjunctiva exhibited no abnormalities [Normal Oral Mucosa] : normal oral mucosa [No Oral Pallor] : no oral pallor [Normal Oropharynx] : normal oropharynx [Normal Jugular Venous V Waves Present] : normal jugular venous V waves present [Respiration, Rhythm And Depth] : normal respiratory rhythm and effort [Auscultation Breath Sounds / Voice Sounds] : lungs were clear to auscultation bilaterally [Abdomen Soft] : soft [Abdomen Tenderness] : non-tender [Abnormal Walk] : normal gait [Nail Clubbing] : no clubbing of the fingernails [Cyanosis, Localized] : no localized cyanosis [Skin Color & Pigmentation] : normal skin color and pigmentation [] : no rash [Oriented To Time, Place, And Person] : oriented to person, place, and time [No Anxiety] : not feeling anxious [Normal] : normal [No Precordial Heave] : no precordial heave was noted [Normal Rate] : normal [Rhythm Regular] : regular [Normal S1] : normal S1 [Normal S2] : normal S2 [No Gallop] : no gallop heard [I] : a grade 1 [2+] : left 2+ [No Pitting Edema] : no pitting edema present [S3] : no S3 [Click] : no click [S4] : no S4 [Pericardial Rub] : no pericardial rub [Left Carotid Bruit] : no bruit heard over the left carotid [Right Carotid Bruit] : no bruit heard over the right carotid

## 2019-12-05 NOTE — PHYSICAL EXAM
[Kyphosis] : no kyphosis [Scoliosis] : no scoliosis [Acne] : no acne [de-identified] : right knee

## 2019-12-05 NOTE — ASSESSMENT
[Cardiology consultation] : Cardiology consultation [Continue medications as is] : Continue current medications [Patient NOT optimized for Surgery at this time] : Patient not optimized for surgery at this time [As per surgery] : as per surgery [FreeTextEntry4] : Physical exam shows a well-developed man in no acute distress blood pressure 108/68 height 5 feet 9 inches weight 235 pounds heart rate of  regular respiratory rate of 16 HEENT was unremarkable chest was clear cardiovascular exam was regular abdomen was soft and nontender extremities showed no clubbing cyanosis or edema neurologic exam is nonfocal EKG showed normal sinus rhythm nonspecific ST-T wave changes which were seen on prior cardiogram which point when he had these changes in early 2019 he was evaluated by cardiology underwent an echocardiogram previous stress test he had had a previous stress test in January of 2018 preop blood tests CBC chemical profile and coagulations are within normal limits... uncertain ideology to his borderline tachycardia and his complaints of being short of breath with exertion patient to see his cardiologist this afternoon at which point a discussion will be performed as to whether he requires workup from a pulmonary and cardiology basis concerned about possible heart disease and also of the possibility of a pulmonary embolus: causing his shortness of breath

## 2019-12-05 NOTE — ASSESSMENT
[FreeTextEntry1] : In summary, the patient is a 64-year-old man who is scheduled for total knee replacement but is admitted to fatigue of uncertain etiology.\par \par Since it has been almost 2 years since his prior coronary disease evaluation I advised him to undergo vasodilator myocardial perfusion imaging prior to clearing him for surgery

## 2019-12-05 NOTE — REASON FOR VISIT
[Consultation] : a consultation regarding [FreeTextEntry1] : This is a 64-year-old man who presents for cardiac clearance for total knee replacement. Patient has a history of hypertension and hyperlipidemia. he has no known history of heart disease and has normal left ventricular systolic function with most recent echocardiogram being in February 2019. He does have a history of mild to moderate aortic insufficiency and left ventricular hypertrophy. The patient who is anxious to have his knee replaced states that he is somewhat more fatigued in recent months. He states however that since his knee began bothering him in the summer he has done no significant activity and feels he just may be deconditioned. He denies chest discomfort palpitations dizziness or syncope. On careful questioning today he denies shortness of breath

## 2019-12-05 NOTE — RESULTS/DATA
[de-identified] : nl [de-identified] : nl [de-identified] : nl [de-identified] : unchanged/ non specific ST-T changes

## 2019-12-05 NOTE — HISTORY OF PRESENT ILLNESS
[No Pertinent Cardiac History] : no history of aortic stenosis, atrial fibrillation, coronary artery disease, recent myocardial infarction, or implantable device/pacemaker [No Pertinent Pulmonary History] : no history of asthma, COPD, sleep apnea, or smoking [No Adverse Anesthesia Reaction] : no adverse anesthesia reaction in self or family member [(Patient denies any chest pain, claudication, dyspnea on exertion, orthopnea, palpitations or syncope)] : Patient denies any chest pain, claudication, dyspnea on exertion, orthopnea, palpitations or syncope [Aortic Stenosis] : no aortic stenosis [Atrial Fibrillation] : no atrial fibrillation [Coronary Artery Disease] : no coronary artery disease [Recent Myocardial Infarction] : no recent myocardial infarction [Implantable Device/Pacemaker] : no implantable device/pacemaker [Asthma] : no asthma [COPD] : no COPD [Sleep Apnea] : no sleep apnea [Smoker] : not a smoker [Family Member] : no family member with adverse anesthesia reaction/sudden death [Self] : no previous adverse anesthesia reaction [Chronic Kidney Disease] : no chronic kidney disease [Chronic Anticoagulation] : no chronic anticoagulation [Diabetes] : no diabetes [FreeTextEntry1] : total knee replacement/right [FreeTextEntry2] : 12/11/19 [FreeTextEntry3] : dr adams [FreeTextEntry4] : 64-year-old man comes to the office for medical clearance for planned right knee replacement patient history is of hypertension bladder cancer cryptogenic stroke erectile dysfunction hyperlipidemia moderate aortic regurgitation fatigue and dyspnea on exertion over the past several months patient denies temperature chills sweats or myalgias he's had no headaches sinus congestion sore throat cough wheezing pleurisy chest pain shortness of breath exertional dyspnea lightheadedness palpitations dizziness vertigo or syncope he has had no abdominal pain nausea vomiting diarrhea or constipation bright red blood per rectum or black stools appetite has been good his weight is stable he denies any urinary symptoms leg edema or recent skin rashes [FreeTextEntry6] : reports dyspnea on exertion x 4 weeks

## 2019-12-05 NOTE — REVIEW OF SYSTEMS
[Fever] : no fever [Chills] : no chills [Hot Flashes] : no hot flashes [Recent Change In Weight] : ~T no recent weight change [Night Sweats] : no night sweats [Discharge] : no discharge [Pain] : no pain [Dryness] : no dryness [Vision Problems] : no vision problems [Redness] : no redness [Earache] : no earache [Itching] : no itching [Hearing Loss] : no hearing loss [Nosebleeds] : no nosebleeds [Postnasal Drip] : no postnasal drip [Nasal Discharge] : no nasal discharge [Hoarseness] : no hoarseness [Sore Throat] : no sore throat [Chest Pain] : no chest pain [Palpitations] : no palpitations [Claudication] : no  leg claudication [Lower Ext Edema] : no lower extremity edema [Shortness Of Breath] : no shortness of breath [Orthopena] : no orthopnea [Wheezing] : no wheezing [Cough] : no cough [Nausea] : no nausea [Abdominal Pain] : no abdominal pain [Constipation] : no constipation [Diarrhea] : no diarrhea [Heartburn] : no heartburn [Vomiting] : no vomiting [Melena] : no melena [Dysuria] : no dysuria [Incontinence] : no incontinence [Hesitancy] : no hesitancy [Hematuria] : no hematuria [Frequency] : no frequency [Back Pain] : no back pain [Muscle Weakness] : no muscle weakness [Joint Swelling] : no joint swelling [Itching] : no itching [Mole Changes] : no mole changes [Nail Changes] : no nail changes [Hair Changes] : no hair changes [Skin Rash] : no skin rash [Headache] : no headache [Dizziness] : no dizziness [Fainting] : no fainting [Confusion] : no confusion [Unsteady Walk] : no ataxia [Memory Loss] : no memory loss [Insomnia] : no insomnia [Suicidal] : not suicidal [Depression] : no depression [Easy Bleeding] : no easy bleeding [Easy Bruising] : no easy bruising [Swollen Glands] : no swollen glands [FreeTextEntry9] : swollen knee /right

## 2019-12-08 ENCOUNTER — FORM ENCOUNTER (OUTPATIENT)
Age: 64
End: 2019-12-08

## 2019-12-09 ENCOUNTER — OUTPATIENT (OUTPATIENT)
Dept: OUTPATIENT SERVICES | Facility: HOSPITAL | Age: 64
LOS: 1 days | End: 2019-12-09
Payer: COMMERCIAL

## 2019-12-09 DIAGNOSIS — Z90.49 ACQUIRED ABSENCE OF OTHER SPECIFIED PARTS OF DIGESTIVE TRACT: Chronic | ICD-10-CM

## 2019-12-09 DIAGNOSIS — Z01.810 ENCOUNTER FOR PREPROCEDURAL CARDIOVASCULAR EXAMINATION: ICD-10-CM

## 2019-12-09 DIAGNOSIS — R53.83 OTHER FATIGUE: ICD-10-CM

## 2019-12-09 DIAGNOSIS — Z98.890 OTHER SPECIFIED POSTPROCEDURAL STATES: Chronic | ICD-10-CM

## 2019-12-09 DIAGNOSIS — R06.00 DYSPNEA, UNSPECIFIED: ICD-10-CM

## 2019-12-09 PROBLEM — D41.4 NEOPLASM OF UNCERTAIN BEHAVIOR OF BLADDER: Chronic | Status: ACTIVE | Noted: 2019-11-26

## 2019-12-09 PROCEDURE — 93018 CV STRESS TEST I&R ONLY: CPT

## 2019-12-09 PROCEDURE — 78452 HT MUSCLE IMAGE SPECT MULT: CPT

## 2019-12-09 PROCEDURE — 78452 HT MUSCLE IMAGE SPECT MULT: CPT | Mod: 26

## 2019-12-09 PROCEDURE — 93017 CV STRESS TEST TRACING ONLY: CPT

## 2019-12-09 PROCEDURE — A9500: CPT

## 2019-12-09 PROCEDURE — 93016 CV STRESS TEST SUPVJ ONLY: CPT

## 2019-12-09 RX ORDER — REGADENOSON 0.08 MG/ML
0.4 INJECTION, SOLUTION INTRAVENOUS ONCE
Refills: 0 | Status: COMPLETED | OUTPATIENT
Start: 2019-12-09 | End: 2019-12-09

## 2019-12-09 RX ADMIN — REGADENOSON 0.4 MILLIGRAM(S): 0.08 INJECTION, SOLUTION INTRAVENOUS at 10:10

## 2019-12-10 ENCOUNTER — FORM ENCOUNTER (OUTPATIENT)
Age: 64
End: 2019-12-10

## 2019-12-11 ENCOUNTER — OUTPATIENT (OUTPATIENT)
Dept: OUTPATIENT SERVICES | Facility: HOSPITAL | Age: 64
LOS: 1 days | End: 2019-12-11
Payer: COMMERCIAL

## 2019-12-11 ENCOUNTER — APPOINTMENT (OUTPATIENT)
Dept: ORTHOPEDIC SURGERY | Facility: HOSPITAL | Age: 64
End: 2019-12-11

## 2019-12-11 DIAGNOSIS — R06.00 DYSPNEA, UNSPECIFIED: ICD-10-CM

## 2019-12-11 DIAGNOSIS — Z98.890 OTHER SPECIFIED POSTPROCEDURAL STATES: Chronic | ICD-10-CM

## 2019-12-11 DIAGNOSIS — Z90.49 ACQUIRED ABSENCE OF OTHER SPECIFIED PARTS OF DIGESTIVE TRACT: Chronic | ICD-10-CM

## 2019-12-11 LAB — DEPRECATED D DIMER PPP IA-ACNC: 154 NG/ML DDU

## 2019-12-11 PROCEDURE — 93970 EXTREMITY STUDY: CPT | Mod: 26

## 2019-12-11 PROCEDURE — 93970 EXTREMITY STUDY: CPT

## 2019-12-17 ENCOUNTER — OUTPATIENT (OUTPATIENT)
Dept: OUTPATIENT SERVICES | Facility: HOSPITAL | Age: 64
LOS: 1 days | End: 2019-12-17
Payer: COMMERCIAL

## 2019-12-17 VITALS
WEIGHT: 238.1 LBS | HEART RATE: 96 BPM | RESPIRATION RATE: 16 BRPM | HEIGHT: 69 IN | DIASTOLIC BLOOD PRESSURE: 77 MMHG | SYSTOLIC BLOOD PRESSURE: 128 MMHG | TEMPERATURE: 98 F | OXYGEN SATURATION: 97 %

## 2019-12-17 DIAGNOSIS — R94.39 ABNORMAL RESULT OF OTHER CARDIOVASCULAR FUNCTION STUDY: ICD-10-CM

## 2019-12-17 DIAGNOSIS — Z98.890 OTHER SPECIFIED POSTPROCEDURAL STATES: Chronic | ICD-10-CM

## 2019-12-17 DIAGNOSIS — Z90.49 ACQUIRED ABSENCE OF OTHER SPECIFIED PARTS OF DIGESTIVE TRACT: Chronic | ICD-10-CM

## 2019-12-17 LAB
ALBUMIN SERPL ELPH-MCNC: 4.9 G/DL — SIGNIFICANT CHANGE UP (ref 3.3–5)
ALP SERPL-CCNC: 87 U/L — SIGNIFICANT CHANGE UP (ref 40–120)
ALT FLD-CCNC: 48 U/L — HIGH (ref 10–45)
ANION GAP SERPL CALC-SCNC: 15 MMOL/L — SIGNIFICANT CHANGE UP (ref 5–17)
AST SERPL-CCNC: 32 U/L — SIGNIFICANT CHANGE UP (ref 10–40)
BILIRUB SERPL-MCNC: 2.5 MG/DL — HIGH (ref 0.2–1.2)
BUN SERPL-MCNC: 20 MG/DL — SIGNIFICANT CHANGE UP (ref 7–23)
CALCIUM SERPL-MCNC: 10 MG/DL — SIGNIFICANT CHANGE UP (ref 8.4–10.5)
CHLORIDE SERPL-SCNC: 98 MMOL/L — SIGNIFICANT CHANGE UP (ref 96–108)
CO2 SERPL-SCNC: 26 MMOL/L — SIGNIFICANT CHANGE UP (ref 22–31)
CREAT SERPL-MCNC: 1.1 MG/DL — SIGNIFICANT CHANGE UP (ref 0.5–1.3)
GLUCOSE SERPL-MCNC: 112 MG/DL — HIGH (ref 70–99)
HCT VFR BLD CALC: 46.5 % — SIGNIFICANT CHANGE UP (ref 39–50)
HGB BLD-MCNC: 16.6 G/DL — SIGNIFICANT CHANGE UP (ref 13–17)
MCHC RBC-ENTMCNC: 32.1 PG — SIGNIFICANT CHANGE UP (ref 27–34)
MCHC RBC-ENTMCNC: 35.7 GM/DL — SIGNIFICANT CHANGE UP (ref 32–36)
MCV RBC AUTO: 89.9 FL — SIGNIFICANT CHANGE UP (ref 80–100)
NRBC # BLD: 0 /100 WBCS — SIGNIFICANT CHANGE UP (ref 0–0)
PLATELET # BLD AUTO: 318 K/UL — SIGNIFICANT CHANGE UP (ref 150–400)
POTASSIUM SERPL-MCNC: 3.5 MMOL/L — SIGNIFICANT CHANGE UP (ref 3.5–5.3)
POTASSIUM SERPL-SCNC: 3.5 MMOL/L — SIGNIFICANT CHANGE UP (ref 3.5–5.3)
PROT SERPL-MCNC: 7.6 G/DL — SIGNIFICANT CHANGE UP (ref 6–8.3)
RBC # BLD: 5.17 M/UL — SIGNIFICANT CHANGE UP (ref 4.2–5.8)
RBC # FLD: 12.4 % — SIGNIFICANT CHANGE UP (ref 10.3–14.5)
SODIUM SERPL-SCNC: 139 MMOL/L — SIGNIFICANT CHANGE UP (ref 135–145)
WBC # BLD: 10.53 K/UL — HIGH (ref 3.8–10.5)
WBC # FLD AUTO: 10.53 K/UL — HIGH (ref 3.8–10.5)

## 2019-12-17 PROCEDURE — 99152 MOD SED SAME PHYS/QHP 5/>YRS: CPT

## 2019-12-17 PROCEDURE — 80053 COMPREHEN METABOLIC PANEL: CPT

## 2019-12-17 PROCEDURE — C1894: CPT

## 2019-12-17 PROCEDURE — 99153 MOD SED SAME PHYS/QHP EA: CPT

## 2019-12-17 PROCEDURE — 93005 ELECTROCARDIOGRAM TRACING: CPT

## 2019-12-17 PROCEDURE — 85027 COMPLETE CBC AUTOMATED: CPT

## 2019-12-17 PROCEDURE — 93454 CORONARY ARTERY ANGIO S&I: CPT | Mod: 26

## 2019-12-17 PROCEDURE — C1769: CPT

## 2019-12-17 PROCEDURE — 93454 CORONARY ARTERY ANGIO S&I: CPT

## 2019-12-17 PROCEDURE — C1887: CPT

## 2019-12-17 PROCEDURE — 93010 ELECTROCARDIOGRAM REPORT: CPT

## 2019-12-17 NOTE — H&P CARDIOLOGY - PMH
Bladder polyp  2010 being monitored by urologist  High cholesterol    HTN (hypertension)    Mini stroke  left eye mini stroke 2018  secondary to uncontrolled HTN,  Monitored by ophthalmologist , eye injections every 10 weeks and laser Rx  Osteoarthritis of right knee

## 2019-12-17 NOTE — H&P CARDIOLOGY - PSH
H/O hernia repair  bilateral inguinal hernia 2018  History of cholecystectomy  2016  S/P reconstruction procedure  left arm reconstruction surgery s/p laceration of left forearm with powersaw  2010

## 2019-12-17 NOTE — H&P CARDIOLOGY - HISTORY OF PRESENT ILLNESS
Patient is 65yo  male with PMHx HTN, HLD, Stroke (left eye with limited vision) who is undergoing medical clearance preop for right knee replacement (injured in July).  Pt was complaining of dyspnea on exertion and fatigue and was evaluated by cardiologist Dr Graham and underwent pharm NST that showed mild ischemia in the apex and distal inferolateral segment.  Pt was referred for [facility] for further ischemic evaluation for which he present stoday.  Denied dizziness, diaphoresis, palpitations, nausea, vomiting, peripheral edema, recent weight gain, or syncope.  No cardiac monitoring device.       Symptoms:        Angina (Class): II       Ischemic Symptoms: dyspnea on exertion    Heart Failure: no       Systolic/Diastolic/Combined:        NYHA Class (within 2 weeks):     Assessment of LVEF:        EF: 67%       Assessed by: Pharm NST       Date: 12/09/19    Prior Cardiac Interventions: no       PCI's:        CABG:     Noninvasive Testing:   Stress Test: Date: 12/9/19       Protocol:        Duration of Exercise:        Symptoms:        EKG Changes:        DTS:        Myocardial Imaging: < from: NM Nuclear Stress Pharmacologic Multiple (12.09.19 @ 12:07) >  Probably normal study however cannot exclude very mild   ischemia in the apex and distal inferolateral segments as described above.       No scan evidence of fixed perfusion defects.       Normal left ventricular wall motion with ejection fraction of 67 %   (normal: 50% or greater).       No regional wall motion abnormalities.     Risk Assessment:     Echo: no    Antianginal Therapies:        Beta Blockers:         Calcium Channel Blockers:        Long Acting Nitrates:        Ranexa:     Associated Risk Factors:        Cerebrovascular Disease: N/A       Chronic Lung Disease: N/A       Peripheral Arterial Disease: N/A       Chronic Kidney Disease (if yes, what is GFR): N/A       Uncontrolled Diabetes (if yes, what is HgbA1C or FBS): N/A       Poorly Controlled Hypertension (if yes, what is SBP): N/A       Morbid Obesity (if yes, what is BMI): N/A       History of Recent Ventricular Arrhythmia: N/A       Inability to Ambulate Safely: N/A       Need for Therapeutic Anticoagulation: N/A       Antiplatelet or Contrast Allergy: N/A

## 2019-12-17 NOTE — H&P CARDIOLOGY - FAMILY HISTORY
Father  Still living? No  Family history of lung disease, Age at diagnosis: Age Unknown     Mother  Still living? Unknown  Family history of sarcoidosis, Age at diagnosis: Age Unknown

## 2019-12-26 ENCOUNTER — APPOINTMENT (OUTPATIENT)
Dept: ORTHOPEDIC SURGERY | Facility: CLINIC | Age: 64
End: 2019-12-26

## 2019-12-27 ENCOUNTER — NON-APPOINTMENT (OUTPATIENT)
Age: 64
End: 2019-12-27

## 2019-12-27 ENCOUNTER — APPOINTMENT (OUTPATIENT)
Dept: CARDIOLOGY | Facility: CLINIC | Age: 64
End: 2019-12-27
Payer: COMMERCIAL

## 2019-12-27 VITALS
HEIGHT: 69 IN | HEART RATE: 99 BPM | SYSTOLIC BLOOD PRESSURE: 115 MMHG | WEIGHT: 230 LBS | BODY MASS INDEX: 34.07 KG/M2 | RESPIRATION RATE: 16 BRPM | DIASTOLIC BLOOD PRESSURE: 70 MMHG | OXYGEN SATURATION: 96 %

## 2019-12-27 VITALS — HEART RATE: 96 BPM | DIASTOLIC BLOOD PRESSURE: 62 MMHG | SYSTOLIC BLOOD PRESSURE: 90 MMHG

## 2019-12-27 PROCEDURE — 99214 OFFICE O/P EST MOD 30 MIN: CPT

## 2019-12-27 PROCEDURE — 93000 ELECTROCARDIOGRAM COMPLETE: CPT

## 2019-12-27 NOTE — REASON FOR VISIT
[Consultation] : a consultation regarding [FreeTextEntry1] : The patient presents for cardiac clearance for total knee replacement. Due unexplained shortness of breath the patient underwent vasodilator myocardial perfusion imaging which revealed the possibility of very mild ischemia. He ultimately underwent coronary angiography which revealed minimal atherosclerosis.

## 2019-12-27 NOTE — ASSESSMENT
[FreeTextEntry1] : In summary,the patient is a 64-year-old man with no significant coronary disease, milld to moderate aortic insufficiency and normal left ventricular systolic function.\par \par There is no cardiac contraindication to the proposed procedure

## 2019-12-27 NOTE — PHYSICAL EXAM
[General Appearance - Well Developed] : well developed [Normal Appearance] : normal appearance [General Appearance - In No Acute Distress] : no acute distress [General Appearance - Well Nourished] : well nourished [Well Groomed] : well groomed [No Deformities] : no deformities [Normal Conjunctiva] : the conjunctiva exhibited no abnormalities [No Oral Pallor] : no oral pallor [Normal Oral Mucosa] : normal oral mucosa [Normal Oropharynx] : normal oropharynx [Normal Jugular Venous V Waves Present] : normal jugular venous V waves present [Abdomen Soft] : soft [Respiration, Rhythm And Depth] : normal respiratory rhythm and effort [Auscultation Breath Sounds / Voice Sounds] : lungs were clear to auscultation bilaterally [Nail Clubbing] : no clubbing of the fingernails [Abdomen Tenderness] : non-tender [Abnormal Walk] : normal gait [] : no rash [Cyanosis, Localized] : no localized cyanosis [Oriented To Time, Place, And Person] : oriented to person, place, and time [Skin Color & Pigmentation] : normal skin color and pigmentation [Normal] : normal [No Anxiety] : not feeling anxious [Rhythm Regular] : regular [Normal Rate] : normal [No Precordial Heave] : no precordial heave was noted [Normal S2] : normal S2 [Normal S1] : normal S1 [No Gallop] : no gallop heard [S4] : no S4 [S3] : no S3 [Click] : no click [Pericardial Rub] : no pericardial rub [I] : a grade 1 [2+] : Carotid: right 2+ [Left Carotid Bruit] : no bruit heard over the left carotid [No Pitting Edema] : no pitting edema present [Right Carotid Bruit] : no bruit heard over the right carotid

## 2019-12-31 ENCOUNTER — OUTPATIENT (OUTPATIENT)
Dept: OUTPATIENT SERVICES | Facility: HOSPITAL | Age: 64
LOS: 1 days | End: 2019-12-31
Payer: COMMERCIAL

## 2019-12-31 VITALS
OXYGEN SATURATION: 99 % | SYSTOLIC BLOOD PRESSURE: 120 MMHG | HEIGHT: 69 IN | TEMPERATURE: 98 F | RESPIRATION RATE: 14 BRPM | DIASTOLIC BLOOD PRESSURE: 77 MMHG | HEART RATE: 98 BPM | WEIGHT: 229.28 LBS

## 2019-12-31 DIAGNOSIS — Z98.890 OTHER SPECIFIED POSTPROCEDURAL STATES: Chronic | ICD-10-CM

## 2019-12-31 DIAGNOSIS — M19.90 UNSPECIFIED OSTEOARTHRITIS, UNSPECIFIED SITE: ICD-10-CM

## 2019-12-31 DIAGNOSIS — Z90.49 ACQUIRED ABSENCE OF OTHER SPECIFIED PARTS OF DIGESTIVE TRACT: Chronic | ICD-10-CM

## 2019-12-31 DIAGNOSIS — R06.83 SNORING: ICD-10-CM

## 2019-12-31 DIAGNOSIS — M17.11 UNILATERAL PRIMARY OSTEOARTHRITIS, RIGHT KNEE: ICD-10-CM

## 2019-12-31 DIAGNOSIS — Z01.818 ENCOUNTER FOR OTHER PREPROCEDURAL EXAMINATION: ICD-10-CM

## 2019-12-31 LAB
ALBUMIN SERPL ELPH-MCNC: 4.2 G/DL — SIGNIFICANT CHANGE UP (ref 3.3–5)
ALP SERPL-CCNC: 82 U/L — SIGNIFICANT CHANGE UP (ref 30–120)
ALT FLD-CCNC: 70 U/L DA — HIGH (ref 10–60)
ANION GAP SERPL CALC-SCNC: 8 MMOL/L — SIGNIFICANT CHANGE UP (ref 5–17)
APTT BLD: 32.4 SEC — SIGNIFICANT CHANGE UP (ref 28.5–37)
AST SERPL-CCNC: 31 U/L — SIGNIFICANT CHANGE UP (ref 10–40)
BILIRUB SERPL-MCNC: 2.8 MG/DL — HIGH (ref 0.2–1.2)
BLD GP AB SCN SERPL QL: SIGNIFICANT CHANGE UP
BUN SERPL-MCNC: 18 MG/DL — SIGNIFICANT CHANGE UP (ref 7–23)
CALCIUM SERPL-MCNC: 9.3 MG/DL — SIGNIFICANT CHANGE UP (ref 8.4–10.5)
CHLORIDE SERPL-SCNC: 99 MMOL/L — SIGNIFICANT CHANGE UP (ref 96–108)
CO2 SERPL-SCNC: 32 MMOL/L — HIGH (ref 22–31)
CREAT SERPL-MCNC: 1.13 MG/DL — SIGNIFICANT CHANGE UP (ref 0.5–1.3)
GLUCOSE SERPL-MCNC: 150 MG/DL — HIGH (ref 70–99)
HCT VFR BLD CALC: 43.2 % — SIGNIFICANT CHANGE UP (ref 39–50)
HGB BLD-MCNC: 15.5 G/DL — SIGNIFICANT CHANGE UP (ref 13–17)
INR BLD: 1.12 RATIO — SIGNIFICANT CHANGE UP (ref 0.88–1.16)
MCHC RBC-ENTMCNC: 32.2 PG — SIGNIFICANT CHANGE UP (ref 27–34)
MCHC RBC-ENTMCNC: 35.9 GM/DL — SIGNIFICANT CHANGE UP (ref 32–36)
MCV RBC AUTO: 89.6 FL — SIGNIFICANT CHANGE UP (ref 80–100)
MRSA PCR RESULT.: SIGNIFICANT CHANGE UP
NRBC # BLD: 0 /100 WBCS — SIGNIFICANT CHANGE UP (ref 0–0)
PLATELET # BLD AUTO: 288 K/UL — SIGNIFICANT CHANGE UP (ref 150–400)
POTASSIUM SERPL-MCNC: 3.6 MMOL/L — SIGNIFICANT CHANGE UP (ref 3.5–5.3)
POTASSIUM SERPL-SCNC: 3.6 MMOL/L — SIGNIFICANT CHANGE UP (ref 3.5–5.3)
PROT SERPL-MCNC: 7.5 G/DL — SIGNIFICANT CHANGE UP (ref 6–8.3)
PROTHROM AB SERPL-ACNC: 12.2 SEC — SIGNIFICANT CHANGE UP (ref 10–12.9)
RBC # BLD: 4.82 M/UL — SIGNIFICANT CHANGE UP (ref 4.2–5.8)
RBC # FLD: 12.3 % — SIGNIFICANT CHANGE UP (ref 10.3–14.5)
S AUREUS DNA NOSE QL NAA+PROBE: SIGNIFICANT CHANGE UP
SODIUM SERPL-SCNC: 139 MMOL/L — SIGNIFICANT CHANGE UP (ref 135–145)
WBC # BLD: 7.78 K/UL — SIGNIFICANT CHANGE UP (ref 3.8–10.5)
WBC # FLD AUTO: 7.78 K/UL — SIGNIFICANT CHANGE UP (ref 3.8–10.5)

## 2019-12-31 PROCEDURE — G0463: CPT

## 2019-12-31 NOTE — H&P PST ADULT - NSICDXFAMILYHX_GEN_ALL_CORE_FT
FAMILY HISTORY:  Father  Still living? No  Family history of lung disease, Age at diagnosis: Age Unknown    Mother  Still living? Unknown  Family history of sarcoidosis, Age at diagnosis: Age Unknown

## 2019-12-31 NOTE — H&P PST ADULT - MUSCULOSKELETAL COMMENTS
Right knee  tender on palpation medial aspect of knee Right knee pain traumatic arthritis s/p work injury

## 2019-12-31 NOTE — H&P PST ADULT - NSICDXPASTMEDICALHX_GEN_ALL_CORE_FT
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

## 2019-12-31 NOTE — H&P PST ADULT - HISTORY OF PRESENT ILLNESS
This is a 63 y/o male who presents with 8 month history of right knee pain and difficulty walking. patient states he injured his knee at work on May, 28th 2019 and MRI revealed meiscus tear and severe arthritis ,. he has been experiencing constant pain since the injury, surgery was cancelled before due to pending stress test and cardiac clearance  .scheduled for right knee replacement on 1/15/20

## 2019-12-31 NOTE — H&P PST ADULT - VISION (WITH CORRECTIVE LENSES IF THE PATIENT USUALLY WEARS THEM):
Partially impaired: cannot see medication labels or newsprint, but can see obstacles in path, and the surrounding layout; can count fingers at arm's length/poor vision in left eye

## 2019-12-31 NOTE — H&P PST ADULT - NSICDXPROBLEM_GEN_ALL_CORE_FT
PROBLEM DIAGNOSES  Problem: Snoring  Assessment and Plan: stop bang 4   CASH precautions   Referred to PCP     Problem: OA (osteoarthritis)  Assessment and Plan: Right knee replacement   Medical  clearance   Cardiac clearance already seen   Pre op instructions

## 2020-01-06 ENCOUNTER — RX RENEWAL (OUTPATIENT)
Age: 65
End: 2020-01-06

## 2020-01-08 ENCOUNTER — APPOINTMENT (OUTPATIENT)
Dept: INTERNAL MEDICINE | Facility: CLINIC | Age: 65
End: 2020-01-08
Payer: OTHER MISCELLANEOUS

## 2020-01-08 VITALS
DIASTOLIC BLOOD PRESSURE: 72 MMHG | OXYGEN SATURATION: 98 % | TEMPERATURE: 97.9 F | SYSTOLIC BLOOD PRESSURE: 118 MMHG | WEIGHT: 229 LBS | RESPIRATION RATE: 16 BRPM | BODY MASS INDEX: 33.92 KG/M2 | HEART RATE: 95 BPM | HEIGHT: 69 IN

## 2020-01-08 PROCEDURE — 99245 OFF/OP CONSLTJ NEW/EST HI 55: CPT

## 2020-01-08 PROCEDURE — 99215 OFFICE O/P EST HI 40 MIN: CPT

## 2020-01-08 RX ORDER — CALCITONIN SALMON 200 [IU]/1
200 SPRAY, METERED NASAL DAILY
Qty: 1 | Refills: 0 | Status: DISCONTINUED | COMMUNITY
Start: 2019-08-01 | End: 2020-01-08

## 2020-01-08 NOTE — RESULTS/DATA
[] : results reviewed [de-identified] : nl [de-identified] : nl [de-identified] : nl [de-identified] : Patient underwent cardiac catheterization on December 17, 2019 with normal coronary arteries [de-identified] : nsr 75 nonspecific st t/  unchanged

## 2020-01-08 NOTE — HISTORY OF PRESENT ILLNESS
[No Pertinent Cardiac History] : no history of aortic stenosis, atrial fibrillation, coronary artery disease, recent myocardial infarction, or implantable device/pacemaker [Aortic Stenosis] : no aortic stenosis [Atrial Fibrillation] : no atrial fibrillation [Recent Myocardial Infarction] : no recent myocardial infarction [Coronary Artery Disease] : no coronary artery disease [Implantable Device/Pacemaker] : no implantable device/pacemaker [No Pertinent Pulmonary History] : no history of asthma, COPD, sleep apnea, or smoking [COPD] : no COPD [Asthma] : no asthma [Sleep Apnea] : no sleep apnea [Smoker] : not a smoker [No Adverse Anesthesia Reaction] : no adverse anesthesia reaction in self or family member [Self] : no previous adverse anesthesia reaction [Family Member] : no family member with adverse anesthesia reaction/sudden death [Chronic Anticoagulation] : no chronic anticoagulation [Chronic Kidney Disease] : no chronic kidney disease [Diabetes] : no diabetes [(Patient denies any chest pain, claudication, dyspnea on exertion, orthopnea, palpitations or syncope)] : Patient denies any chest pain, claudication, dyspnea on exertion, orthopnea, palpitations or syncope [FreeTextEntry1] : right knee replacement [FreeTextEntry3] : dr adams [FreeTextEntry2] : 01/15/20 [FreeTextEntry4] : 64-year-old man comes to the office for medical clearance for planned right total knee replacement patient's history is that of hypertension bladder cancer moderate aortic regurgitation erectile dysfunction and chronic headaches he denies temperature chills sweats or myalgias he's had no headache sinus congestion throat cough wheezing pleurisy chest pain shortness of breath exertional dyspnea lightheadedness palpitations dizziness vertigo or syncope he has had no abdominal pain nausea vomiting diarrhea constipation bright red blood per rectum or black stools appetite has been good his weight has been stable denies dysuria or gross hematuria he has had no leg edema and denies any recent skin rashes [FreeTextEntry7] : Cardiac catheterization normal December 17, 2019

## 2020-01-08 NOTE — REVIEW OF SYSTEMS
[Fatigue] : fatigue [Dyspnea on Exertion] : dyspnea on exertion [Nocturia] : nocturia [Impotence] : impotence [Joint Pain] : joint pain [Poor Libido] : poor libido [Muscle Pain] : muscle pain [Joint Stiffness] : joint stiffness [Anxiety] : anxiety [Fever] : no fever [Hot Flashes] : no hot flashes [Chills] : no chills [Night Sweats] : no night sweats [Recent Change In Weight] : ~T no recent weight change [Discharge] : no discharge [Pain] : no pain [Dryness] : no dryness [Redness] : no redness [Vision Problems] : no vision problems [Itching] : no itching [Hearing Loss] : no hearing loss [Earache] : no earache [Nasal Discharge] : no nasal discharge [Nosebleeds] : no nosebleeds [Postnasal Drip] : no postnasal drip [Hoarseness] : no hoarseness [Chest Pain] : no chest pain [Sore Throat] : no sore throat [Lower Ext Edema] : no lower extremity edema [Claudication] : no  leg claudication [Palpitations] : no palpitations [Wheezing] : no wheezing [Shortness Of Breath] : no shortness of breath [Orthopena] : no orthopnea [Nausea] : no nausea [Abdominal Pain] : no abdominal pain [Cough] : no cough [Constipation] : no constipation [Diarrhea] : no diarrhea [Vomiting] : no vomiting [Heartburn] : no heartburn [Melena] : no melena [Incontinence] : no incontinence [Dysuria] : no dysuria [Muscle Weakness] : no muscle weakness [Hematuria] : no hematuria [Frequency] : no frequency [Hesitancy] : no hesitancy [Joint Swelling] : no joint swelling [Back Pain] : no back pain [Nail Changes] : no nail changes [Mole Changes] : no mole changes [Hair Changes] : no hair changes [Dizziness] : no dizziness [Skin Rash] : no skin rash [Headache] : no headache [Fainting] : no fainting [Unsteady Walk] : no ataxia [Confusion] : no confusion [Memory Loss] : no memory loss [Suicidal] : not suicidal [Insomnia] : no insomnia [Easy Bleeding] : no easy bleeding [Depression] : no depression [Swollen Glands] : no swollen glands [Easy Bruising] : no easy bruising [FreeTextEntry9] : swollen knee /right

## 2020-01-08 NOTE — ASSESSMENT
[Patient Optimized for Surgery] : Patient optimized for surgery [Continue medications as is] : Continue current medications [No Further Testing Recommended] : no further testing recommended [As per surgery] : as per surgery [FreeTextEntry4] : Physical exam shows a well-developed man in no acute distress blood pressure 118/72 height 5 feet 9 inches weight 229 pounds afebrile at 97.9°F orally heart rate 95 respirations 16 HEENT was unremarkable chest was clear cardiovascular was regular abdomen was soft and the show no edema neurologic exam is nonfocal preoperative EKG nonspecific ST-T wave changes which are chronic the patient underwent cardiac clearance by Dr. Blas note patient had a coronary angiogram on December 17, 2019 which was normal preoperative blood tests are without abnormalities....... he is cleared medically for planned knee replacement

## 2020-01-08 NOTE — PHYSICAL EXAM
[No Acute Distress] : no acute distress [Well Nourished] : well nourished [Well Developed] : well developed [Well-Appearing] : well-appearing [Normal Sclera/Conjunctiva] : normal sclera/conjunctiva [Normal Voice/Communication] : normal voice/communication [PERRL] : pupils equal round and reactive to light [EOMI] : extraocular movements intact [Normal Outer Ear/Nose] : the outer ears and nose were normal in appearance [Normal Oropharynx] : the oropharynx was normal [Normal TMs] : both tympanic membranes were normal [No JVD] : no jugular venous distention [Normal Nasal Mucosa] : the nasal mucosa was normal [No Lymphadenopathy] : no lymphadenopathy [Supple] : supple [Thyroid Normal, No Nodules] : the thyroid was normal and there were no nodules present [No Accessory Muscle Use] : no accessory muscle use [Clear to Auscultation] : lungs were clear to auscultation bilaterally [No Respiratory Distress] : no respiratory distress  [Normal Rate] : normal rate  [Regular Rhythm] : with a regular rhythm [Normal Percussion] : the chest was normal to percussion [No Murmur] : no murmur heard [Normal S1, S2] : normal S1 and S2 [No Carotid Bruits] : no carotid bruits [No Abdominal Bruit] : a ~M bruit was not heard ~T in the abdomen [No Varicosities] : no varicosities [Pedal Pulses Present] : the pedal pulses are present [No Palpable Aorta] : no palpable aorta [No Edema] : there was no peripheral edema [No Extremity Clubbing/Cyanosis] : no extremity clubbing/cyanosis [Declined Breast Exam] : declined breast exam  [Soft] : abdomen soft [Non Tender] : non-tender [No Masses] : no abdominal mass palpated [Non-distended] : non-distended [Normal Bowel Sounds] : normal bowel sounds [No HSM] : no HSM [No Hernias] : no hernias [Declined Rectal Exam] : declined rectal exam [Normal Supraclavicular Nodes] : no supraclavicular lymphadenopathy [Normal Anterior Cervical Nodes] : no anterior cervical lymphadenopathy [Normal Axillary Nodes] : no axillary lymphadenopathy [Normal Posterior Cervical Nodes] : no posterior cervical lymphadenopathy [No CVA Tenderness] : no CVA  tenderness [Normal Inguinal Nodes] : no inguinal lymphadenopathy [Normal Femoral Nodes] : no femoral lymphadenopathy [No Spinal Tenderness] : no spinal tenderness [Grossly Normal Strength/Tone] : grossly normal strength/tone [No Skin Lesions] : no skin lesions [No Rash] : no rash [Normal Gait] : normal gait [No Focal Deficits] : no focal deficits [Coordination Grossly Intact] : coordination grossly intact [Deep Tendon Reflexes (DTR)] : deep tendon reflexes were 2+ and symmetric [Speech Grossly Normal] : speech grossly normal [Memory Grossly Normal] : memory grossly normal [Normal Mood] : the mood was normal [Normal Affect] : the affect was normal [Alert and Oriented x3] : oriented to person, place, and time [Normal Insight/Judgement] : insight and judgment were intact [Kyphosis] : no kyphosis [Scoliosis] : no scoliosis [Acne] : no acne [de-identified] : right knee

## 2020-01-14 ENCOUNTER — TRANSCRIPTION ENCOUNTER (OUTPATIENT)
Age: 65
End: 2020-01-14

## 2020-01-15 ENCOUNTER — APPOINTMENT (OUTPATIENT)
Dept: ORTHOPEDIC SURGERY | Facility: HOSPITAL | Age: 65
End: 2020-01-15

## 2020-01-15 ENCOUNTER — RESULT REVIEW (OUTPATIENT)
Age: 65
End: 2020-01-15

## 2020-01-15 ENCOUNTER — INPATIENT (INPATIENT)
Facility: HOSPITAL | Age: 65
LOS: 3 days | Discharge: HOME | DRG: 470 | End: 2020-01-19
Attending: ORTHOPAEDIC SURGERY | Admitting: ORTHOPAEDIC SURGERY
Payer: COMMERCIAL

## 2020-01-15 VITALS
RESPIRATION RATE: 23 BRPM | HEART RATE: 97 BPM | DIASTOLIC BLOOD PRESSURE: 69 MMHG | SYSTOLIC BLOOD PRESSURE: 140 MMHG | OXYGEN SATURATION: 98 % | WEIGHT: 226.86 LBS | HEIGHT: 69 IN | TEMPERATURE: 98 F

## 2020-01-15 DIAGNOSIS — M17.11 UNILATERAL PRIMARY OSTEOARTHRITIS, RIGHT KNEE: ICD-10-CM

## 2020-01-15 DIAGNOSIS — Z90.49 ACQUIRED ABSENCE OF OTHER SPECIFIED PARTS OF DIGESTIVE TRACT: Chronic | ICD-10-CM

## 2020-01-15 DIAGNOSIS — Z98.890 OTHER SPECIFIED POSTPROCEDURAL STATES: Chronic | ICD-10-CM

## 2020-01-15 LAB
ANION GAP SERPL CALC-SCNC: 11 MMOL/L — SIGNIFICANT CHANGE UP (ref 5–17)
ANION GAP SERPL CALC-SCNC: 5 MMOL/L — SIGNIFICANT CHANGE UP (ref 5–17)
BUN SERPL-MCNC: 20 MG/DL — SIGNIFICANT CHANGE UP (ref 7–23)
BUN SERPL-MCNC: 22 MG/DL — SIGNIFICANT CHANGE UP (ref 7–23)
CALCIUM SERPL-MCNC: 8.6 MG/DL — SIGNIFICANT CHANGE UP (ref 8.4–10.5)
CALCIUM SERPL-MCNC: 8.8 MG/DL — SIGNIFICANT CHANGE UP (ref 8.4–10.5)
CHLORIDE SERPL-SCNC: 102 MMOL/L — SIGNIFICANT CHANGE UP (ref 96–108)
CHLORIDE SERPL-SCNC: 105 MMOL/L — SIGNIFICANT CHANGE UP (ref 96–108)
CO2 SERPL-SCNC: 30 MMOL/L — SIGNIFICANT CHANGE UP (ref 22–31)
CO2 SERPL-SCNC: 31 MMOL/L — SIGNIFICANT CHANGE UP (ref 22–31)
CREAT SERPL-MCNC: 1.27 MG/DL — SIGNIFICANT CHANGE UP (ref 0.5–1.3)
CREAT SERPL-MCNC: 1.29 MG/DL — SIGNIFICANT CHANGE UP (ref 0.5–1.3)
GLUCOSE SERPL-MCNC: 150 MG/DL — HIGH (ref 70–99)
GLUCOSE SERPL-MCNC: 164 MG/DL — HIGH (ref 70–99)
HCT VFR BLD CALC: 37.5 % — LOW (ref 39–50)
HCT VFR BLD CALC: 38.1 % — LOW (ref 39–50)
HGB BLD-MCNC: 12.9 G/DL — LOW (ref 13–17)
HGB BLD-MCNC: 13.2 G/DL — SIGNIFICANT CHANGE UP (ref 13–17)
MCHC RBC-ENTMCNC: 31.7 PG — SIGNIFICANT CHANGE UP (ref 27–34)
MCHC RBC-ENTMCNC: 31.7 PG — SIGNIFICANT CHANGE UP (ref 27–34)
MCHC RBC-ENTMCNC: 34.4 GM/DL — SIGNIFICANT CHANGE UP (ref 32–36)
MCHC RBC-ENTMCNC: 34.6 GM/DL — SIGNIFICANT CHANGE UP (ref 32–36)
MCV RBC AUTO: 91.6 FL — SIGNIFICANT CHANGE UP (ref 80–100)
MCV RBC AUTO: 92.1 FL — SIGNIFICANT CHANGE UP (ref 80–100)
NRBC # BLD: 0 /100 WBCS — SIGNIFICANT CHANGE UP (ref 0–0)
NRBC # BLD: 0 /100 WBCS — SIGNIFICANT CHANGE UP (ref 0–0)
PLATELET # BLD AUTO: 218 K/UL — SIGNIFICANT CHANGE UP (ref 150–400)
PLATELET # BLD AUTO: 252 K/UL — SIGNIFICANT CHANGE UP (ref 150–400)
POTASSIUM SERPL-MCNC: 3.7 MMOL/L — SIGNIFICANT CHANGE UP (ref 3.5–5.3)
POTASSIUM SERPL-MCNC: 4.3 MMOL/L — SIGNIFICANT CHANGE UP (ref 3.5–5.3)
POTASSIUM SERPL-SCNC: 3.7 MMOL/L — SIGNIFICANT CHANGE UP (ref 3.5–5.3)
POTASSIUM SERPL-SCNC: 4.3 MMOL/L — SIGNIFICANT CHANGE UP (ref 3.5–5.3)
RBC # BLD: 4.07 M/UL — LOW (ref 4.2–5.8)
RBC # BLD: 4.16 M/UL — LOW (ref 4.2–5.8)
RBC # FLD: 12.4 % — SIGNIFICANT CHANGE UP (ref 10.3–14.5)
RBC # FLD: 12.5 % — SIGNIFICANT CHANGE UP (ref 10.3–14.5)
SODIUM SERPL-SCNC: 141 MMOL/L — SIGNIFICANT CHANGE UP (ref 135–145)
SODIUM SERPL-SCNC: 143 MMOL/L — SIGNIFICANT CHANGE UP (ref 135–145)
WBC # BLD: 16.32 K/UL — HIGH (ref 3.8–10.5)
WBC # BLD: 16.79 K/UL — HIGH (ref 3.8–10.5)
WBC # FLD AUTO: 16.32 K/UL — HIGH (ref 3.8–10.5)
WBC # FLD AUTO: 16.79 K/UL — HIGH (ref 3.8–10.5)

## 2020-01-15 PROCEDURE — 73562 X-RAY EXAM OF KNEE 3: CPT | Mod: 26,RT

## 2020-01-15 PROCEDURE — 88311 DECALCIFY TISSUE: CPT | Mod: 26

## 2020-01-15 PROCEDURE — 27447 TOTAL KNEE ARTHROPLASTY: CPT | Mod: RT

## 2020-01-15 PROCEDURE — 99223 1ST HOSP IP/OBS HIGH 75: CPT

## 2020-01-15 PROCEDURE — 27447 TOTAL KNEE ARTHROPLASTY: CPT | Mod: AS,RT

## 2020-01-15 PROCEDURE — 88305 TISSUE EXAM BY PATHOLOGIST: CPT | Mod: 26

## 2020-01-15 RX ORDER — CEFAZOLIN SODIUM 1 G
2000 VIAL (EA) INJECTION EVERY 8 HOURS
Refills: 0 | Status: COMPLETED | OUTPATIENT
Start: 2020-01-15 | End: 2020-01-16

## 2020-01-15 RX ORDER — PANTOPRAZOLE SODIUM 20 MG/1
40 TABLET, DELAYED RELEASE ORAL
Refills: 0 | Status: DISCONTINUED | OUTPATIENT
Start: 2020-01-15 | End: 2020-01-19

## 2020-01-15 RX ORDER — HYDROMORPHONE HYDROCHLORIDE 2 MG/ML
0.5 INJECTION INTRAMUSCULAR; INTRAVENOUS; SUBCUTANEOUS
Refills: 0 | Status: DISCONTINUED | OUTPATIENT
Start: 2020-01-15 | End: 2020-01-15

## 2020-01-15 RX ORDER — OXYCODONE HYDROCHLORIDE 5 MG/1
10 TABLET ORAL
Refills: 0 | Status: DISCONTINUED | OUTPATIENT
Start: 2020-01-15 | End: 2020-01-19

## 2020-01-15 RX ORDER — ACETAMINOPHEN 500 MG
1000 TABLET ORAL EVERY 6 HOURS
Refills: 0 | Status: COMPLETED | OUTPATIENT
Start: 2020-01-15 | End: 2020-01-16

## 2020-01-15 RX ORDER — APREPITANT 80 MG/1
40 CAPSULE ORAL ONCE
Refills: 0 | Status: COMPLETED | OUTPATIENT
Start: 2020-01-15 | End: 2020-01-15

## 2020-01-15 RX ORDER — SODIUM CHLORIDE 9 MG/ML
1000 INJECTION, SOLUTION INTRAVENOUS
Refills: 0 | Status: DISCONTINUED | OUTPATIENT
Start: 2020-01-15 | End: 2020-01-17

## 2020-01-15 RX ORDER — OXYCODONE HYDROCHLORIDE 5 MG/1
5 TABLET ORAL
Refills: 0 | Status: DISCONTINUED | OUTPATIENT
Start: 2020-01-15 | End: 2020-01-19

## 2020-01-15 RX ORDER — POLYETHYLENE GLYCOL 3350 17 G/17G
17 POWDER, FOR SOLUTION ORAL DAILY
Refills: 0 | Status: DISCONTINUED | OUTPATIENT
Start: 2020-01-15 | End: 2020-01-19

## 2020-01-15 RX ORDER — CELECOXIB 200 MG/1
200 CAPSULE ORAL EVERY 12 HOURS
Refills: 0 | Status: DISCONTINUED | OUTPATIENT
Start: 2020-01-16 | End: 2020-01-19

## 2020-01-15 RX ORDER — ASPIRIN/CALCIUM CARB/MAGNESIUM 324 MG
81 TABLET ORAL EVERY 12 HOURS
Refills: 0 | Status: DISCONTINUED | OUTPATIENT
Start: 2020-01-16 | End: 2020-01-19

## 2020-01-15 RX ORDER — TRANEXAMIC ACID 100 MG/ML
1000 INJECTION, SOLUTION INTRAVENOUS ONCE
Refills: 0 | Status: COMPLETED | OUTPATIENT
Start: 2020-01-15 | End: 2020-01-15

## 2020-01-15 RX ORDER — ONDANSETRON 8 MG/1
4 TABLET, FILM COATED ORAL ONCE
Refills: 0 | Status: DISCONTINUED | OUTPATIENT
Start: 2020-01-15 | End: 2020-01-15

## 2020-01-15 RX ORDER — MAGNESIUM HYDROXIDE 400 MG/1
30 TABLET, CHEWABLE ORAL DAILY
Refills: 0 | Status: DISCONTINUED | OUTPATIENT
Start: 2020-01-15 | End: 2020-01-19

## 2020-01-15 RX ORDER — LANOLIN ALCOHOL/MO/W.PET/CERES
3 CREAM (GRAM) TOPICAL AT BEDTIME
Refills: 0 | Status: COMPLETED | OUTPATIENT
Start: 2020-01-15 | End: 2020-01-15

## 2020-01-15 RX ORDER — CHLORHEXIDINE GLUCONATE 213 G/1000ML
1 SOLUTION TOPICAL ONCE
Refills: 0 | Status: COMPLETED | OUTPATIENT
Start: 2020-01-15 | End: 2020-01-15

## 2020-01-15 RX ORDER — HYDROMORPHONE HYDROCHLORIDE 2 MG/ML
0.5 INJECTION INTRAMUSCULAR; INTRAVENOUS; SUBCUTANEOUS
Refills: 0 | Status: DISCONTINUED | OUTPATIENT
Start: 2020-01-15 | End: 2020-01-19

## 2020-01-15 RX ORDER — ACETAMINOPHEN 500 MG
2 TABLET ORAL
Qty: 0 | Refills: 0 | DISCHARGE

## 2020-01-15 RX ORDER — ACETAMINOPHEN 500 MG
1000 TABLET ORAL ONCE
Refills: 0 | Status: COMPLETED | OUTPATIENT
Start: 2020-01-15 | End: 2020-01-15

## 2020-01-15 RX ORDER — ONDANSETRON 8 MG/1
4 TABLET, FILM COATED ORAL EVERY 6 HOURS
Refills: 0 | Status: DISCONTINUED | OUTPATIENT
Start: 2020-01-15 | End: 2020-01-19

## 2020-01-15 RX ORDER — CEFAZOLIN SODIUM 1 G
2000 VIAL (EA) INJECTION ONCE
Refills: 0 | Status: COMPLETED | OUTPATIENT
Start: 2020-01-15 | End: 2020-01-15

## 2020-01-15 RX ORDER — ACETAMINOPHEN 500 MG
1000 TABLET ORAL EVERY 8 HOURS
Refills: 0 | Status: DISCONTINUED | OUTPATIENT
Start: 2020-01-16 | End: 2020-01-19

## 2020-01-15 RX ORDER — SENNA PLUS 8.6 MG/1
2 TABLET ORAL AT BEDTIME
Refills: 0 | Status: DISCONTINUED | OUTPATIENT
Start: 2020-01-15 | End: 2020-01-19

## 2020-01-15 RX ORDER — ATORVASTATIN CALCIUM 80 MG/1
40 TABLET, FILM COATED ORAL AT BEDTIME
Refills: 0 | Status: DISCONTINUED | OUTPATIENT
Start: 2020-01-15 | End: 2020-01-19

## 2020-01-15 RX ORDER — LISINOPRIL 2.5 MG/1
20 TABLET ORAL DAILY
Refills: 0 | Status: DISCONTINUED | OUTPATIENT
Start: 2020-01-17 | End: 2020-01-19

## 2020-01-15 RX ORDER — AMLODIPINE BESYLATE 2.5 MG/1
10 TABLET ORAL DAILY
Refills: 0 | Status: DISCONTINUED | OUTPATIENT
Start: 2020-01-17 | End: 2020-01-19

## 2020-01-15 RX ORDER — SODIUM CHLORIDE 9 MG/ML
1000 INJECTION, SOLUTION INTRAVENOUS
Refills: 0 | Status: DISCONTINUED | OUTPATIENT
Start: 2020-01-15 | End: 2020-01-15

## 2020-01-15 RX ADMIN — HYDROMORPHONE HYDROCHLORIDE 0.5 MILLIGRAM(S): 2 INJECTION INTRAMUSCULAR; INTRAVENOUS; SUBCUTANEOUS at 23:30

## 2020-01-15 RX ADMIN — Medication 3 MILLIGRAM(S): at 23:14

## 2020-01-15 RX ADMIN — HYDROMORPHONE HYDROCHLORIDE 0.5 MILLIGRAM(S): 2 INJECTION INTRAMUSCULAR; INTRAVENOUS; SUBCUTANEOUS at 23:13

## 2020-01-15 RX ADMIN — Medication 400 MILLIGRAM(S): at 23:14

## 2020-01-15 RX ADMIN — Medication 100 MILLIGRAM(S): at 16:30

## 2020-01-15 RX ADMIN — Medication 400 MILLIGRAM(S): at 15:24

## 2020-01-15 RX ADMIN — ATORVASTATIN CALCIUM 40 MILLIGRAM(S): 80 TABLET, FILM COATED ORAL at 23:14

## 2020-01-15 RX ADMIN — Medication 1000 MILLIGRAM(S): at 15:40

## 2020-01-15 RX ADMIN — APREPITANT 40 MILLIGRAM(S): 80 CAPSULE ORAL at 06:57

## 2020-01-15 RX ADMIN — CHLORHEXIDINE GLUCONATE 1 APPLICATION(S): 213 SOLUTION TOPICAL at 06:57

## 2020-01-15 RX ADMIN — SODIUM CHLORIDE 100 MILLILITER(S): 9 INJECTION, SOLUTION INTRAVENOUS at 12:32

## 2020-01-15 RX ADMIN — Medication 1000 MILLIGRAM(S): at 23:15

## 2020-01-15 NOTE — PHYSICAL THERAPY INITIAL EVALUATION ADULT - RANGE OF MOTION EXAMINATION, REHAB EVAL
Left LE ROM was WFL (within functional limits)/right knee flexion 60 deg in sitting/deficits as listed below

## 2020-01-15 NOTE — CONSULT NOTE ADULT - ASSESSMENT
POD#0 s/p RIGHT TKR  - Pain control  - Bowel regimen  - PT/OT  - VTE PPx - caprini 8 - ASA BID    HLD  - continue statin    HTN  - hold HCTZ  - resume ACEI on POD#2  - continue amlodipine    Unequivocal nuclear stress test, followed up with cardiac cath which did NOT reveal any significant obstructive lesions. POD#0 s/p RIGHT TKR  - Pain control  - Bowel regimen  - PT/OT  - VTE PPx - caprini 8 - ASA BID    HLD  - continue statin    HTN  - hold HCTZ  - resume ACEI on POD#2  - continue amlodipine    At risk of CASH  - stop bang 4  - obesity  - monitor on tele and nocturnal oxygen    Unequivocal nuclear stress test, followed up with cardiac cath which did NOT reveal any significant obstructive lesions.

## 2020-01-15 NOTE — PROVIDER CONTACT NOTE (OTHER) - DATE AND TIME:
After Visit Summary   2/5/2018    Rosaura Guzmán    MRN: [id number]           Patient Information     Date Of Birth          1961        Visit Information        Provider Department      2/5/2018 4:30 PM Raza Watts, PT [facility] [place] [facility]        Today's Diagnoses     Lumbago    -  1       Follow-ups after your visit        Your next 10 appointments already scheduled     Feb 09, 2018  4:30 PM CST   LUCERO Spine with Raza Watts, PT   [facility] [place] [place] [facility] ([facility] [place])    [address]  [address]  [place] [address]   [phone]            Feb 12, 2018  5:10 PM CST   LUCERO Spine with Raza Watts, PT   [facility] [place] [facility] ([facility] [place])    [address]  [address]  [place] [address]   [phone]            Feb 16, 2018  4:30 PM CST   LUCERO Spine with Raza Watts PT   [facility] ([facility] [place])    [address]  [address]  [place] [address]   [phone]            Feb 19, 2018  4:30 PM CST   LUCERO Spine with Raza Watts, PT   [facility] [place] [facility] ([facility] [place])    [address]  [address]  [place] [address]   [phone]            Feb 23, 2018  4:30 PM CST   LUCERO Spine with Raza Watts, PT   [facility] ([facility] [place])    [address]  [address]  [place] [address]   [phone]              Who to contact     If you have questions or need follow up information about today's clinic visit or your schedule please contact [facility] directly at [phone].  Normal or non-critical lab and imaging results will be communicated to you by MyChart, letter or  "phone within 4 business days after the clinic has received the results. If you do not hear from us within 7 days, please contact the clinic through [facility] or phone. If you have a critical or abnormal lab result, we will notify you by phone as soon as possible.  Submit refill requests through [employer] or call your pharmacy and they will forward the refill request to us. Please allow 3 business days for your refill to be completed.          Additional Information About Your Visit        Cinario Information     [employer] lets you send messages to your doctor, view your test results, renew your prescriptions, schedule appointments and more. To sign up, go to [email or web address] . Click on \"Log in\" on the left side of the screen, which will take you to the Welcome page. Then click on \"Sign up Now\" on the right side of the page.     You will be asked to enter the access code listed below, as well as some personal information. Please follow the directions to create your username and password.     Your access code is: [email or web address]  Expires: 2018  5:45 PM     Your access code will  in 90 days. If you need help or a new code, please call your [facility] clinic or [phone].        Care EveryWhere ID     This is your Care EveryWhere ID. This could be used by other organizations to access your [facility] medical records  [id number]         Blood Pressure from Last 3 Encounters:   No data found for BP    Weight from Last 3 Encounters:   No data found for Wt              We Performed the Following     HC PT EVAL, LOW COMPLEXITY     LUCERO INITIAL EVAL REPORT     THERAPEUTIC EXERCISES        Primary Care Provider Fax #    Physician No Ref-Primary [phone]       No address on file        Equal Access to Services     [facility]: Haddanielle Jimenez, trey soria, jaciel mike. So [facility] [phone].    ATENCIÓN: Si becky espadiel martin a goff " 15-Wyatt-2020 14:00 disposición servicios gratuitos de asistencia lingüística. Darwin zapata [phone].    We comply with applicable federal civil rights laws and [place] laws. We do not discriminate on the basis of race, color, national origin, age, disability, sex, sexual orientation, or gender identity.            Thank you!     Thank you for choosing [facility]  for your care. Our goal is always to provide you with excellent care. Hearing back from our patients is one way we can continue to improve our services. Please take a few minutes to complete the written survey that you may receive in the mail after your visit with us. Thank you!             Your Updated Medication List - Protect others around you: Learn how to safely use, store and throw away your medicines at www.disposemymeds.org.      Notice  As of 2/5/2018  5:45 PM    You have not been prescribed any medications.

## 2020-01-15 NOTE — PHYSICAL THERAPY INITIAL EVALUATION ADULT - GAIT TRAINING, PT EVAL
Goals 2-4 days, Pt will ambulate 150 ft w/ rolling walker independently.   Pt will negotiate 15 steps with straight cane and rail with supervision

## 2020-01-15 NOTE — PROGRESS NOTE ADULT - SUBJECTIVE AND OBJECTIVE BOX
Post Op     ANDREY MCDERMOTTURINO      64y        Male                                                                                                                 T(C): 37.1 (01-15-20 @ 11:40), Max: 37.1 (01-15-20 @ 11:40)  HR: 83 (01-15-20 @ 15:00) (83 - 97)  BP: 103/45 (01-15-20 @ 15:00) (88/52 - 140/69)  RR: 14 (01-15-20 @ 15:00) (14 - 24)  SpO2: 100% (01-15-20 @ 15:00) (98% - 100%)  Wt(kg): --    S/P   total knee replacement left     Patient denies shortness of breath, chest pain, dyspnea, No complaints  Pain is 3 /10    Physical Exam    Extremity: Bilaterally:  No holmon                                           No Cord                                          PAS on b/l                                           Neurovascular intact                                          Motor intact EHL/FHL                                          Sensation intact                                          Pulses intact DP/PT                                         Calves Soft                                         Dressing Clean / Dry / Intact hv                                          Capillary refill with 5 seconds                          13.2   16.79 )-----------( 218      ( 15 Wyatt 2020 14:15 )             38.1       01-15    141  |  105  |  22  ----------------------------<  164<H>  3.7   |  31  |  1.29    Ca    8.6      15 Wyatt 2020 14:15        A/P  -- S/P total knee replacement  left    -  Medicine To Follow   - DVT prophylaxis PAS ASA 81mg po bid  - PT & OT   - Analgesia  - Incentive Spirometry  - Discharge Planning  - Safety Precautions  -  CBC , BMP daily    Drain clamped as per surgeon HV output 500cc in  4 hours @ h/h checked stable  _ Hypotension as per PACU nurse stable medicine aware

## 2020-01-15 NOTE — PHYSICAL THERAPY INITIAL EVALUATION ADULT - CRITERIA FOR SKILLED THERAPEUTIC INTERVENTIONS
impairments found/functional limitations in following categories/anticipated discharge recommendation/anticipated equipment needs at discharge

## 2020-01-15 NOTE — PHYSICAL THERAPY INITIAL EVALUATION ADULT - ADDITIONAL COMMENTS
Pt lives with daughter in a house w/ 1 step to enter.  13 steps with rail  inside to bedroom/bathroom.  There is no bathroom on the first floor.  Pt has straight cane

## 2020-01-15 NOTE — PHYSICAL THERAPY INITIAL EVALUATION ADULT - GAIT DEVIATIONS NOTED, PT EVAL
mild right knee buckling/decreased maureen/decreased velocity of limb motion/decreased step length/decreased weight-shifting ability

## 2020-01-16 ENCOUNTER — TRANSCRIPTION ENCOUNTER (OUTPATIENT)
Age: 65
End: 2020-01-16

## 2020-01-16 LAB
ANION GAP SERPL CALC-SCNC: 8 MMOL/L — SIGNIFICANT CHANGE UP (ref 5–17)
BUN SERPL-MCNC: 21 MG/DL — SIGNIFICANT CHANGE UP (ref 7–23)
CALCIUM SERPL-MCNC: 8.4 MG/DL — SIGNIFICANT CHANGE UP (ref 8.4–10.5)
CHLORIDE SERPL-SCNC: 103 MMOL/L — SIGNIFICANT CHANGE UP (ref 96–108)
CO2 SERPL-SCNC: 28 MMOL/L — SIGNIFICANT CHANGE UP (ref 22–31)
CREAT SERPL-MCNC: 1.07 MG/DL — SIGNIFICANT CHANGE UP (ref 0.5–1.3)
GLUCOSE SERPL-MCNC: 134 MG/DL — HIGH (ref 70–99)
HCT VFR BLD CALC: 32.3 % — LOW (ref 39–50)
HGB BLD-MCNC: 11.2 G/DL — LOW (ref 13–17)
MCHC RBC-ENTMCNC: 31.4 PG — SIGNIFICANT CHANGE UP (ref 27–34)
MCHC RBC-ENTMCNC: 34.7 GM/DL — SIGNIFICANT CHANGE UP (ref 32–36)
MCV RBC AUTO: 90.5 FL — SIGNIFICANT CHANGE UP (ref 80–100)
NRBC # BLD: 0 /100 WBCS — SIGNIFICANT CHANGE UP (ref 0–0)
PLATELET # BLD AUTO: 253 K/UL — SIGNIFICANT CHANGE UP (ref 150–400)
POTASSIUM SERPL-MCNC: 3.8 MMOL/L — SIGNIFICANT CHANGE UP (ref 3.5–5.3)
POTASSIUM SERPL-SCNC: 3.8 MMOL/L — SIGNIFICANT CHANGE UP (ref 3.5–5.3)
RBC # BLD: 3.57 M/UL — LOW (ref 4.2–5.8)
RBC # FLD: 12.6 % — SIGNIFICANT CHANGE UP (ref 10.3–14.5)
SODIUM SERPL-SCNC: 139 MMOL/L — SIGNIFICANT CHANGE UP (ref 135–145)
WBC # BLD: 15.11 K/UL — HIGH (ref 3.8–10.5)
WBC # FLD AUTO: 15.11 K/UL — HIGH (ref 3.8–10.5)

## 2020-01-16 PROCEDURE — 99233 SBSQ HOSP IP/OBS HIGH 50: CPT

## 2020-01-16 RX ORDER — CELECOXIB 200 MG/1
1 CAPSULE ORAL
Qty: 60 | Refills: 0
Start: 2020-01-16 | End: 2020-02-14

## 2020-01-16 RX ORDER — ASPIRIN/CALCIUM CARB/MAGNESIUM 324 MG
1 TABLET ORAL
Qty: 82 | Refills: 0
Start: 2020-01-16 | End: 2020-02-25

## 2020-01-16 RX ORDER — ACETAMINOPHEN 500 MG
2 TABLET ORAL
Qty: 0 | Refills: 0 | DISCHARGE
Start: 2020-01-16

## 2020-01-16 RX ORDER — PANTOPRAZOLE SODIUM 20 MG/1
1 TABLET, DELAYED RELEASE ORAL
Qty: 30 | Refills: 1
Start: 2020-01-16 | End: 2020-03-15

## 2020-01-16 RX ORDER — OXYCODONE HYDROCHLORIDE 5 MG/1
1 TABLET ORAL
Qty: 42 | Refills: 0
Start: 2020-01-16 | End: 2020-01-22

## 2020-01-16 RX ADMIN — OXYCODONE HYDROCHLORIDE 10 MILLIGRAM(S): 5 TABLET ORAL at 18:17

## 2020-01-16 RX ADMIN — HYDROMORPHONE HYDROCHLORIDE 0.5 MILLIGRAM(S): 2 INJECTION INTRAMUSCULAR; INTRAVENOUS; SUBCUTANEOUS at 05:12

## 2020-01-16 RX ADMIN — Medication 400 MILLIGRAM(S): at 05:13

## 2020-01-16 RX ADMIN — OXYCODONE HYDROCHLORIDE 10 MILLIGRAM(S): 5 TABLET ORAL at 17:47

## 2020-01-16 RX ADMIN — OXYCODONE HYDROCHLORIDE 10 MILLIGRAM(S): 5 TABLET ORAL at 21:05

## 2020-01-16 RX ADMIN — ATORVASTATIN CALCIUM 40 MILLIGRAM(S): 80 TABLET, FILM COATED ORAL at 20:31

## 2020-01-16 RX ADMIN — Medication 100 MILLIGRAM(S): at 00:41

## 2020-01-16 RX ADMIN — Medication 81 MILLIGRAM(S): at 05:13

## 2020-01-16 RX ADMIN — Medication 81 MILLIGRAM(S): at 17:43

## 2020-01-16 RX ADMIN — OXYCODONE HYDROCHLORIDE 10 MILLIGRAM(S): 5 TABLET ORAL at 10:19

## 2020-01-16 RX ADMIN — Medication 1000 MILLIGRAM(S): at 09:55

## 2020-01-16 RX ADMIN — PANTOPRAZOLE SODIUM 40 MILLIGRAM(S): 20 TABLET, DELAYED RELEASE ORAL at 05:13

## 2020-01-16 RX ADMIN — HYDROMORPHONE HYDROCHLORIDE 0.5 MILLIGRAM(S): 2 INJECTION INTRAMUSCULAR; INTRAVENOUS; SUBCUTANEOUS at 05:30

## 2020-01-16 RX ADMIN — OXYCODONE HYDROCHLORIDE 10 MILLIGRAM(S): 5 TABLET ORAL at 09:49

## 2020-01-16 RX ADMIN — Medication 1000 MILLIGRAM(S): at 09:49

## 2020-01-16 RX ADMIN — Medication 1000 MILLIGRAM(S): at 17:43

## 2020-01-16 RX ADMIN — CELECOXIB 200 MILLIGRAM(S): 200 CAPSULE ORAL at 20:31

## 2020-01-16 RX ADMIN — OXYCODONE HYDROCHLORIDE 10 MILLIGRAM(S): 5 TABLET ORAL at 20:32

## 2020-01-16 RX ADMIN — Medication 1000 MILLIGRAM(S): at 17:47

## 2020-01-16 RX ADMIN — Medication 1000 MILLIGRAM(S): at 05:13

## 2020-01-16 NOTE — DISCHARGE NOTE PROVIDER - NSDCHHCONTACT_GEN_ALL_CORE_FT
HPI:    Patient ID: James Osei is a 44year old female. Patient presents with:  Palpitations    HPI   C/O fluttering feeling in heart  Onset: approx 4 weeks  Palpitation only after exercise - Starts 10 to 60 minutes after exercising.  No sx duri ibuprofen use             and is not supposed to take it    HISTORY:  Past Medical History   Diagnosis Date   • Other and unspecified hyperlipidemia    • Migraine    • Esophageal reflux    • Depression    • Anxiety state, unspecified    • Migraines    • Dy regular rhythm. No murmur heard. Pulmonary/Chest: Effort normal and breath sounds normal.   Musculoskeletal: She exhibits no edema. Lymphadenopathy:     She has no cervical adenopathy. Psychiatric: She has a normal mood and affect.    Vitals reviewe As certified below, I, or a nurse practitioner or physician assistant working with me, had a face-to-face encounter that meets the physician face-to-face encounter requirements.

## 2020-01-16 NOTE — OCCUPATIONAL THERAPY INITIAL EVALUATION ADULT - ADDITIONAL COMMENTS
Pt lives with his daughter in a private home with 1 step to enter; bedroom/bathroom on 2nd floor. Bathroom has a tub. Pt reported PTA he was independent with ADLs and functional mobility; shared IADLs with dtr. Pt reported he has shower chair, commode, and cane. Pt lives with his daughter in a private home with 1 step to enter; bedroom/bathroom on 2nd floor. Bathroom has a tub. Pt reported PTA he was independent with ADLs and functional mobility; shared IADLs with dtr. Pt reported he has a cane. Works at [employer].

## 2020-01-16 NOTE — DISCHARGE NOTE NURSING/CASE MANAGEMENT/SOCIAL WORK - NSSCNAMETXT_GEN_ALL_CORE
[facility] (formerly [facility])   [address] [place] [address]

## 2020-01-16 NOTE — PROGRESS NOTE ADULT - SUBJECTIVE AND OBJECTIVE BOX
ANDREY PALFAOX      64y Male                                                                                                                               POD # 1       STATUS POST:               Pre-Op Dx: Right knee DJD    Post-Op Dx:  Right knee DJD    Procedure: Right total knee replacement                                                  T(F): 98.1  HR: 103  BP: 106/66  RR: 16  SpO2: 95%                        11.2   15.11 )-----------( 253      ( 16 Jan 2020 07:15 )             32.3                     01-16    139  |  103  |  21  ----------------------------<  134<H>  3.8   |  28  |  1.07    Ca    8.4      16 Jan 2020 07:15    HV with 930cc total but appears to be slowing down    Physical Exam :    -   Dressing C/D/I.   -   Distal Neurvascular status intact grossly.   -   Warm well perfused; capillary refill <3 seconds   -   (+)EHL/FHL 5/5  -   (+) Sensation to light touch  -   (-) Calf tenderness Bilaterally    A/P: 64y Male s/p Right TKR    -   Ortho Stable  -   Pain control   -   Medicine to follow  -   D/C drain today  -   DVT ppx:     [x ]SCDs     [x ] ASA     [ ] Eliquis     [ ] Lovenox  -   Weight bearing status:  WBAT [x ]        PWB    [ ]     TTWB  [ ]      NWB  [ ]   -  Dispo:     Home [x ]     Acute Rehab [ ]     ISSA [ ]     TBD [ ]

## 2020-01-16 NOTE — DISCHARGE NOTE PROVIDER - NSDCACTIVITY_GEN_ALL_CORE
Walking - Outdoors allowed/Showering allowed/Walking - Indoors allowed/Do not drive or operate machinery/Stairs allowed/No heavy lifting/straining

## 2020-01-16 NOTE — DISCHARGE NOTE NURSING/CASE MANAGEMENT/SOCIAL WORK - PATIENT PORTAL LINK FT
You can access the FollowMyHealth Patient Portal offered by [facility] by registering at the following website: [email or web address]. By joining [employer]’s FollowMyHealth portal, you will also be able to view your health information using other applications (apps) compatible with our system.

## 2020-01-16 NOTE — PROGRESS NOTE ADULT - SUBJECTIVE AND OBJECTIVE BOX
INTERVAL HPI/OVERNIGHT EVENTS:   Patient seen and examined.  Couldn't sleep for non-specific reason.  No fevers, chills, sweats, dizziness, HA, changes in vision, cp, palpitations, sob, persistent cough, n/v/d, abd pain, dysuria, focal weakness, or calf pain.   Voiding, eating.    REVIEW OF SYSTEMS:  See HPI,  all others negative    PHYSICAL EXAM:  Vital Signs Last 24 Hrs  T(C): 36.7 (2020 07:09), Max: 37.3 (15 Wyatt 2020 23:33)  T(F): 98.1 (2020 07:09), Max: 99.2 (15 Wyatt 2020 23:33)  HR: 103 (2020 07:09) (83 - 110)  BP: 106/66 (2020 07:09) (88/52 - 123/67)  BP(mean): --  RR: 16 (2020 07:09) (14 - 24)  SpO2: 95% (2020 07:09) (95% - 100%)    GENERAL: NAD, well-groomed, well-developed, awake, alert, oriented x 3, fluent and coherent speech  EYES: EOMI, PERRLA, conjunctiva and sclera clear  ENMT: No tonsillar erythema, exudates, or enlargement  NECK: Supple, No JVD, No Cervical LAD  NERVOUS SYSTEM:  Good concentration; Moving all 4 extremities against gravity and resistance; No gross sensory deficits, No facial droop  CHEST WALL: No masses  CHEST/LUNG: Clear to auscultation bilaterally; No rales, rhonchi, wheezing, or rubs  HEART: Regular rate and rhythm; No murmurs, rubs, or gallops  ABDOMEN: Soft, Nontender, Nondistended, Bowel sounds present, No palpable masses or organomegaly, No bruits  EXTREMITIES:  2+ Peripheral Pulses, No clubbing, cyanosis, or edema    Diagnostic Testin.2   15.11 )-----------( 253      ( 2020 07:15 )             32.3     2020 07:15    139    |  103    |  21     ----------------------------<  134    3.8     |  28     |  1.07     Ca    8.4        2020 07:15

## 2020-01-16 NOTE — PROGRESS NOTE ADULT - ASSESSMENT
POD#1 s/p RIGHT TKR  - Pain control  - Bowel regimen  - PT/OT  - VTE PPx - caprini 8 - ASA BID  - Wants to go home with HC    HLD  - continue statin    HTN  - hold HCTZ  - resume ACEI on POD#2  - continue amlodipine    At risk of CASH  - stop bang 4  - obesity  - monitor on tele and nocturnal oxygen    Unequivocal nuclear stress test, followed up with cardiac cath which did NOT reveal any significant obstructive lesions.

## 2020-01-16 NOTE — OCCUPATIONAL THERAPY INITIAL EVALUATION ADULT - IADL RETRAINING, OT EVAL
Patient will increase standing tolerance to 3-5 minutes for grooming at the sink within 2-3 sessions

## 2020-01-16 NOTE — PROGRESS NOTE ADULT - SUBJECTIVE AND OBJECTIVE BOX
ANDREY CHRISTINEDANTE                                                                 [id number]                                                    Allergies---No Known Allergies        Pt is a 64y year old Male s/p right TKR.   Pain is 3/10.   Tolerating the diet.   The patient is A&O x 3, resting comfortably in bed with no complaints.   Denies any chest pain / shortness of breath / dyspnea/ nausea / vomiting / headaches or light headed ness.         Vital Signs Last 24 Hrs  T(F): 98.1 (01-16-20 @ 07:09), Max: 99.2 (01-15-20 @ 23:33)  HR: 103 (01-16-20 @ 07:09)  BP: 106/66 (01-16-20 @ 07:09)  RR: 16 (01-16-20 @ 07:09)  SpO2: 95% (01-16-20 @ 07:09)    I&O's Detail    15 Wyatt 2020 07:01  -  16 Jan 2020 07:00  --------------------------------------------------------  IN:    IV PiggyBack: 150 mL    lactated ringers.: 200 mL    lactated ringers.: 2700 mL  Total IN: 3050 mL    OUT:    Accordian: 930 mL    Estimated Blood Loss: 250 mL    Voided: 2100 mL  Total OUT: 3280 mL    Total NET: -230 mL        PE:   Right Lower Extremity:  Dressing/Ace wrap is C/D/I.   Neurovascularly intact.   No gross evidence of motor or sensory deficit.   +2  DP/PT pulses.   EHL/FHL/TA intact.   Toes are pink and mobile.   Capillary refill < 2 seconds.   Negative calf tenderness.   PAS on.   HV in place.                                11.2   15.11 )--------------( 253                          01-16-20 @ 07:15               32.3       139   |  103  |  21  -----------------------------<  134                  01-16-20 @ 07:15  3.8    |  28    |  1.07        Ca    8.4              A:   Pt is a 64y year old Male S/P right total knee replacement, Post Op Day #1        Plan:    - Follow up with Medicine   - OOB with PT/OT   - Pain control    - Incentive spirometry   - Labs in A.M.   - Dressing change tomorrow   - D/C planning   - DVT ppx = PAS +  aspirin enteric coated 81 milliGRAM(s) Oral every 12 hours                                                                                                                                                                               Nasir TARANGO

## 2020-01-16 NOTE — DISCHARGE NOTE PROVIDER - HOSPITAL COURSE
ANDREY PALAFOX        Admitted on 01-15-20         65yo.  Male with history of Right knee DJD        Surgery:   Right total knee replacement        Orthopedic Surgeon:   Dr. SHANON Fernández        Alexa-operative antibiotic:  ceFAZolin   IVPB:,             Consulted Services : Hospitalist, Physical Therapy, Occupational Therapy        Typical Physical & occupational therapy modalities post Right total knee replacement     were performed including ambulation training, range of motion, ADL's, and transfers.         DVT prophylaxis:  aspirin enteric coated: 81 milliGRAM(s)             The patient had a clean appearing surgical incision with no sign of surgical site infections and had a stable neuro / vascular exam of the operated extremity.  After progression of mobility guided by the PT/ OT staff,  the patient was felt to benefit from further rehabilitative care for restoration to level of function. This was felt to best be accomplished at Home.        Discharge and Orthopedic Care instructions were delineated in the Discharge Plan and reviewed with the patient. All medications were delineated in the medication reconciliation tool and key points were reviewed with the patient.         This patient was deemed stable from an Orthopedic & medical standpoint for discharge today.    He will follow up with Dr. SHANON Fernández for further Orthopedic care.

## 2020-01-16 NOTE — DISCHARGE NOTE PROVIDER - CARE PROVIDER_API CALL
Les Fernández)  Orthopaedic Sports Medicine; Orthopaedic Surgery  [address]  [place] [address]  Phone: [phone]  Fax: [phone]  Scheduled Appointment: 01/30/2020 01:00 PM

## 2020-01-16 NOTE — DISCHARGE NOTE PROVIDER - NSDCFUADDINST_GEN_ALL_CORE_FT
Physical Therapy/Occupational Therapy for ambulation, transfers, stairs, ADL's, Range of Motion Exercises, Isometrics.  Full weight bearing as tolerated with rolling walker  Range of Motion Goals: Flexion 120 degrees; Extension 0 degrees  Keep incision clean and dry.  Suture/prineo dressing removal 14 days after surgery at rehab facility or Surgeon's office  May shower post-op day #5 if no drainage from incision  Follow up with your primary care physician within 2-3 weeks of discharge home    Call your doctor if you experience:  • An increase in pain not controlled by pain medication or change in activity or  position.  • Temperature greater than 101° F.  • Redness, increased swelling or foul smelling drainage from or around the  incision.  • Numbness, tingling or a change in color or temperature of the operative leg.  • Call your doctor immediately if you experience chest pain, shortness of breath or calf pain.    For Constipation :   • Increase your water intake. Drink at least 8 glasses of water daily.  • Try adding fiber to your diet by eating fruits, vegetables and foods that are rich in grains.  • If you do experience constipation, you may take an over-the-counter stool softener/laxative such as Colace, Senokot or Milk of Magnesia.

## 2020-01-16 NOTE — ANESTHESIA FOLLOW-UP NOTE - NSEVALATIONFT_GEN_ALL_CORE
Had discussion with patient about difficulty placing spinal yesterday. Encouraged him to reveal to future anesthesia providers should he need other surgeries.

## 2020-01-16 NOTE — OCCUPATIONAL THERAPY INITIAL EVALUATION ADULT - PHYSICAL ASSIST/NONPHYSICAL ASSIST: GROOMING, OT EVAL
Pt presents to ER with c/o midsternal chest pain that began this morning.  Pt also reports SOB.  RA sats 81% when pt arrived in room.  O2 sats increased to 98% on nonrebreather.   set-up required

## 2020-01-16 NOTE — DISCHARGE NOTE PROVIDER - NSDCCPCAREPLAN_GEN_ALL_CORE_FT
PRINCIPAL DISCHARGE DIAGNOSIS  Diagnosis: Primary localized osteoarthritis of right knee  Assessment and Plan of Treatment:

## 2020-01-16 NOTE — DISCHARGE NOTE PROVIDER - NSDCFUSCHEDAPPT_GEN_ALL_CORE_FT
ANDREY PALAFOX ; 01/30/2020 ; [facility] ANDREY PALAFOX ; 01/30/2020 ; [facility] ANDREY PALAFOX ; 01/30/2020 ; [facility] ANDREY PALAFOX ; 01/30/2020 ; [facility] Orth[facility]urg [facility]

## 2020-01-16 NOTE — DISCHARGE NOTE PROVIDER - NSDCMRMEDTOKEN_GEN_ALL_CORE_FT
3 in 1 commode: s/p Right TKR  to assist with ADLs  acetaminophen 500 mg oral tablet: 2 tab(s) orally every 8 hours for 2-3 weeks after surgery  amLODIPine 10 mg oral tablet: 1 tab(s) orally once a day  aspirin 81 mg oral delayed release tablet: 1 tab(s) orally every 12 hours. Take at least 2 hours before celebrex  atorvastatin 40 mg oral tablet: 1 tab(s) orally once a day  CeleBREX 200 mg oral capsule: 1 cap(s) orally 2 times a day. Take at least 2 hours after aspirin  hydroCHLOROthiazide 12.5 mg oral capsule: 1 cap(s) orally once a day  lisinopril 20 mg oral tablet: orally 2 times a day  oxyCODONE 5 mg oral tablet: 1 tab(s) orally every 4 hours, As Needed - Pain  MDD:6   Reference #: [id number]   pantoprazole 40 mg oral delayed release tablet: 1 tab(s) orally once a day (before a meal)  rolling walker with 5 inch wheels: s/p Right TKR  to assist with ADLs 3 in 1 commode: s/p Right TKR  to assist with ADLs  acetaminophen 500 mg oral tablet: 2 tab(s) orally every 8 hours for 2-3 weeks after surgery  amLODIPine 10 mg oral tablet: 1 tab(s) orally once a day  aspirin 81 mg oral delayed release tablet: 1 tab(s) orally every 12 hours. Take at least 2 hours before celebrex  atorvastatin 40 mg oral tablet: 1 tab(s) orally once a day  CeleBREX 200 mg oral capsule: 1 cap(s) orally 2 times a day. Take at least 2 hours after aspirin  hydroCHLOROthiazide 12.5 mg oral capsule: 1 cap(s) orally once a day  lisinopril 20 mg oral tablet: orally 2 times a day  oxyCODONE 5 mg oral tablet: 1 tab(s) orally every 4 hours, As Needed - Pain  MDD:6   Reference #: [id number]   pantoprazole 40 mg oral delayed release tablet: 1 tab(s) orally once a day (before a meal)  rolling walker with 5 inch wheels: s/p Right TKR  to assist with ADLs 3 in 1 commode: s/p Right TKR  to assist with ADLs  acetaminophen 500 mg oral tablet: 2 tab(s) orally every 8 hours for 2-3 weeks after surgery  amLODIPine 10 mg oral tablet: 1 tab(s) orally once a day  aspirin 81 mg oral delayed release tablet: 1 tab(s) orally every 12 hours. Take at least 2 hours before celebrex  atorvastatin 40 mg oral tablet: 1 tab(s) orally once a day  CeleBREX 200 mg oral capsule: 1 cap(s) orally 2 times a day. Take at least 2 hours after aspirin  hydroCHLOROthiazide 12.5 mg oral capsule: 1 cap(s) orally once a day  lisinopril 20 mg oral tablet: orally 2 times a day  oxyCODONE 5 mg oral tablet: 1 tab(s) orally every 4 hours, As Needed - Pain  MDD:6   Reference #: [id number]   pantoprazole 40 mg oral delayed release tablet: 1 tab(s) orally once a day (before a meal)  rolling walker with 5 inch wheels: s/p Right TKR  to assist with ADLs 3 in 1 commode: s/p Right TKR  to assist with ADLs  acetaminophen 500 mg oral tablet: 2 tab(s) orally every 8 hours for 2-3 weeks after surgery  amLODIPine 10 mg oral tablet: 1 tab(s) orally once a day  aspirin 81 mg oral delayed release tablet: 1 tab(s) orally every 12 hours. Take at least 2 hours before celebrex  atorvastatin 40 mg oral tablet: 1 tab(s) orally once a day  CeleBREX 200 mg oral capsule: 1 cap(s) orally 2 times a day. Take at least 2 hours after aspirin  hydroCHLOROthiazide 12.5 mg oral capsule: 1 cap(s) orally once a day  lisinopril 20 mg oral tablet: orally 2 times a day  oxyCODONE 5 mg oral tablet: 1 tab(s) orally every 4 hours, As Needed - Pain  MDD:6   Reference #: [id number]   pantoprazole 40 mg oral delayed release tablet: 1 tab(s) orally once a day (before a meal)  rolling walker with 5 inch wheels: s/p Right TKR  to assist with ADLs

## 2020-01-17 LAB
ANION GAP SERPL CALC-SCNC: 6 MMOL/L — SIGNIFICANT CHANGE UP (ref 5–17)
BUN SERPL-MCNC: 18 MG/DL — SIGNIFICANT CHANGE UP (ref 7–23)
CALCIUM SERPL-MCNC: 8.4 MG/DL — SIGNIFICANT CHANGE UP (ref 8.4–10.5)
CHLORIDE SERPL-SCNC: 102 MMOL/L — SIGNIFICANT CHANGE UP (ref 96–108)
CO2 SERPL-SCNC: 29 MMOL/L — SIGNIFICANT CHANGE UP (ref 22–31)
CREAT SERPL-MCNC: 0.91 MG/DL — SIGNIFICANT CHANGE UP (ref 0.5–1.3)
GLUCOSE SERPL-MCNC: 127 MG/DL — HIGH (ref 70–99)
HCT VFR BLD CALC: 31.3 % — LOW (ref 39–50)
HGB BLD-MCNC: 11 G/DL — LOW (ref 13–17)
MCHC RBC-ENTMCNC: 31.9 PG — SIGNIFICANT CHANGE UP (ref 27–34)
MCHC RBC-ENTMCNC: 35.1 GM/DL — SIGNIFICANT CHANGE UP (ref 32–36)
MCV RBC AUTO: 90.7 FL — SIGNIFICANT CHANGE UP (ref 80–100)
NRBC # BLD: 0 /100 WBCS — SIGNIFICANT CHANGE UP (ref 0–0)
PLATELET # BLD AUTO: 222 K/UL — SIGNIFICANT CHANGE UP (ref 150–400)
POTASSIUM SERPL-MCNC: 3.8 MMOL/L — SIGNIFICANT CHANGE UP (ref 3.5–5.3)
POTASSIUM SERPL-SCNC: 3.8 MMOL/L — SIGNIFICANT CHANGE UP (ref 3.5–5.3)
RBC # BLD: 3.45 M/UL — LOW (ref 4.2–5.8)
RBC # FLD: 12.8 % — SIGNIFICANT CHANGE UP (ref 10.3–14.5)
SODIUM SERPL-SCNC: 137 MMOL/L — SIGNIFICANT CHANGE UP (ref 135–145)
WBC # BLD: 12.63 K/UL — HIGH (ref 3.8–10.5)
WBC # FLD AUTO: 12.63 K/UL — HIGH (ref 3.8–10.5)

## 2020-01-17 PROCEDURE — 99232 SBSQ HOSP IP/OBS MODERATE 35: CPT

## 2020-01-17 RX ORDER — SODIUM CHLORIDE 9 MG/ML
1000 INJECTION INTRAMUSCULAR; INTRAVENOUS; SUBCUTANEOUS ONCE
Refills: 0 | Status: COMPLETED | OUTPATIENT
Start: 2020-01-17 | End: 2020-01-17

## 2020-01-17 RX ADMIN — Medication 81 MILLIGRAM(S): at 18:58

## 2020-01-17 RX ADMIN — OXYCODONE HYDROCHLORIDE 5 MILLIGRAM(S): 5 TABLET ORAL at 15:17

## 2020-01-17 RX ADMIN — PANTOPRAZOLE SODIUM 40 MILLIGRAM(S): 20 TABLET, DELAYED RELEASE ORAL at 05:15

## 2020-01-17 RX ADMIN — CELECOXIB 200 MILLIGRAM(S): 200 CAPSULE ORAL at 08:48

## 2020-01-17 RX ADMIN — OXYCODONE HYDROCHLORIDE 5 MILLIGRAM(S): 5 TABLET ORAL at 15:47

## 2020-01-17 RX ADMIN — Medication 1000 MILLIGRAM(S): at 18:58

## 2020-01-17 RX ADMIN — Medication 81 MILLIGRAM(S): at 05:15

## 2020-01-17 RX ADMIN — Medication 1000 MILLIGRAM(S): at 18:57

## 2020-01-17 RX ADMIN — CELECOXIB 200 MILLIGRAM(S): 200 CAPSULE ORAL at 08:49

## 2020-01-17 RX ADMIN — SODIUM CHLORIDE 1000 MILLILITER(S): 9 INJECTION INTRAMUSCULAR; INTRAVENOUS; SUBCUTANEOUS at 11:11

## 2020-01-17 RX ADMIN — SENNA PLUS 2 TABLET(S): 8.6 TABLET ORAL at 20:42

## 2020-01-17 RX ADMIN — POLYETHYLENE GLYCOL 3350 17 GRAM(S): 17 POWDER, FOR SOLUTION ORAL at 12:45

## 2020-01-17 RX ADMIN — Medication 1000 MILLIGRAM(S): at 08:48

## 2020-01-17 RX ADMIN — Medication 1000 MILLIGRAM(S): at 08:49

## 2020-01-17 RX ADMIN — CELECOXIB 200 MILLIGRAM(S): 200 CAPSULE ORAL at 20:43

## 2020-01-17 RX ADMIN — OXYCODONE HYDROCHLORIDE 5 MILLIGRAM(S): 5 TABLET ORAL at 23:29

## 2020-01-17 RX ADMIN — AMLODIPINE BESYLATE 10 MILLIGRAM(S): 2.5 TABLET ORAL at 05:15

## 2020-01-17 RX ADMIN — ATORVASTATIN CALCIUM 40 MILLIGRAM(S): 80 TABLET, FILM COATED ORAL at 20:42

## 2020-01-17 RX ADMIN — LISINOPRIL 20 MILLIGRAM(S): 2.5 TABLET ORAL at 05:15

## 2020-01-17 RX ADMIN — CELECOXIB 200 MILLIGRAM(S): 200 CAPSULE ORAL at 20:42

## 2020-01-17 NOTE — PROVIDER CONTACT NOTE (OTHER) - ACTION/TREATMENT ORDERED:
MD does not think it's cardiac related.  1000ml NS IV Bolus given over one hour per Dr Dillard's order

## 2020-01-17 NOTE — PROGRESS NOTE ADULT - SUBJECTIVE AND OBJECTIVE BOX
INTERVAL HPI/OVERNIGHT EVENTS:   Patient seen and examined.  No fevers, chills, sweats, dizziness, HA, changes in vision, cp, palpitations, sob, persistent cough, n/v/d, abd pain, dysuria, focal weakness, or calf pain.   Voiding, eating, +flatus.  Offers no complaints today.    REVIEW OF SYSTEMS:  See HPI,  all others negative    PHYSICAL EXAM:  Vital Signs Last 24 Hrs  T(C): 36.9 (2020 07:56), Max: 37 (2020 03:44)  T(F): 98.5 (2020 07:56), Max: 98.6 (2020 03:44)  HR: 108 (2020 07:56) (102 - 115)  BP: 110/60 (2020 07:56) (102/63 - 144/74)  BP(mean): --  RR: 17 (2020 07:56) (15 - 17)  SpO2: 94% (2020 07:56) (94% - 99%)    GENERAL: NAD, well-groomed, well-developed, awake, alert, oriented x 3, fluent and coherent speech  NECK: Supple, No JVD, No Cervical LAD  NERVOUS SYSTEM:  Good concentration; Moving all 4 extremities against gravity and resistance; No gross sensory deficits, No facial droop  CHEST WALL: No masses  CHEST/LUNG: Clear to auscultation bilaterally; No rales, rhonchi, wheezing, or rubs  HEART: Regular rate and rhythm; No murmurs, rubs, or gallops  ABDOMEN: Soft, Nontender, Nondistended, Bowel sounds present, No palpable masses or organomegaly, No bruits  EXTREMITIES:  2+ Peripheral Pulses, No clubbing, cyanosis, or edema    Diagnostic Testin.0   12.63 )-----------( 222      ( 2020 07:21 )             31.3     -17    137  |  102  |  18  ----------------------------<  127<H>  3.8   |  29  |  0.91    Ca    8.4      2020 07:21

## 2020-01-17 NOTE — PROGRESS NOTE ADULT - SUBJECTIVE AND OBJECTIVE BOX
Orthopedic P.A.- POD# 2 - s/p Right TKR    Patient alert and comfortable in bed with oral meds for pain control.  Denies knee pain or nausea.    Vital Signs Last 24 Hrs  T(C): 36.9 (17 Jan 2020 07:56), Max: 37 (17 Jan 2020 03:44)  T(F): 98.5 (17 Jan 2020 07:56), Max: 98.6 (17 Jan 2020 03:44)  HR: 108 (17 Jan 2020 07:56) (102 - 115)  BP: 110/60 (17 Jan 2020 07:56) (102/63 - 144/74)  BP(mean): --  RR: 17 (17 Jan 2020 07:56) (15 - 17)  SpO2: 94% (17 Jan 2020 07:56) (94% - 99%)         I&O's Detail    16 Jan 2020 07:01  -  17 Jan 2020 07:00  --------------------------------------------------------  IN:  Total IN: 0 mL    OUT:    Voided: 1650 mL  Total OUT: 1650 mL    Total NET: -1650 mL                     Labs:                                                                   11.0<L>  12.63<H> )-----------( 222      ( 17 Jan 2020 07:21 )             31.3<L>  17 Jan 2020 07:21                                17 Jan 2020 07:21    137    |  102    |  18     ----------------------------<  127<H>  3.8     |  29     |  0.91     Ca    8.4        17 Jan 2020 07:21        MEDICATIONS:acetaminophen   Tablet .. 1000 milliGRAM(s) Oral every 8 hours  aluminum hydroxide/magnesium hydroxide/simethicone Suspension 30 milliLiter(s) Oral four times a day PRN  amLODIPine   Tablet 10 milliGRAM(s) Oral daily  aspirin enteric coated 81 milliGRAM(s) Oral every 12 hours  atorvastatin 40 milliGRAM(s) Oral at bedtime  bisacodyl Suppository 10 milliGRAM(s) Rectal daily PRN  celecoxib 200 milliGRAM(s) Oral every 12 hours  HYDROmorphone  Injectable 0.5 milliGRAM(s) IV Push every 3 hours PRN  lactated ringers. 1000 milliLiter(s) IV Continuous <Continuous>  lisinopril 20 milliGRAM(s) Oral daily  magnesium hydroxide Suspension 30 milliLiter(s) Oral daily PRN  ondansetron Injectable 4 milliGRAM(s) IV Push every 6 hours PRN  oxyCODONE    IR 5 milliGRAM(s) Oral every 3 hours PRN  oxyCODONE    IR 10 milliGRAM(s) Oral every 3 hours PRN  pantoprazole    Tablet 40 milliGRAM(s) Oral before breakfast  polyethylene glycol 3350 17 Gram(s) Oral daily  senna 2 Tablet(s) Oral at bedtime PRN    Anticoagulation:  aspirin enteric coated 81 milliGRAM(s) Oral every 12 hours        Pain medications:   acetaminophen   Tablet .. 1000 milliGRAM(s) Oral every 8 hours  celecoxib 200 milliGRAM(s) Oral every 12 hours  HYDROmorphone  Injectable 0.5 milliGRAM(s) IV Push every 3 hours PRN  ondansetron Injectable 4 milliGRAM(s) IV Push every 6 hours PRN  oxyCODONE    IR 5 milliGRAM(s) Oral every 3 hours PRN  oxyCODONE    IR 10 milliGRAM(s) Oral every 3 hours PRN                                      Physical Exam:  Right knee- Primary surgical bandage removed and sterile bandage placed over dry and intact Prineo tape over surgical wound. Drain removed yesterday. .  Neurovascular grossly intact LE's.  PAS on LE's.  Calves soft and non-tender.                                                                                                                                                          A/P:  Orthopedically stable.  -Continue pain management with above plan.  -DVT prophylaxis with 6 weeks of Ecotrin 81mg po every 12 hours.  -Labs stable today.  -Increase ambulation and ROM right knee with PT/OT and clearance for safe ambulation and stairs.  -Dr. Dillard for medical clearance for discharge home today.  -Further plan as per attendings.

## 2020-01-17 NOTE — PROGRESS NOTE ADULT - ASSESSMENT
POD#2 s/p RIGHT TKR  - Pain control  - Bowel regimen  - PT/OT  - VTE PPx - caprini 8 - ASA BID  - stable for discharge from medical perspective    HLD  - continue statin    Daughter has "pink eye" at home  - unclear if viral or bacterial  - advised patient on contact precautions, he understood, daughter will be staying on different floor in house for at least 2-3 days    HTN  - hold HCTZ  - resume ACEI on POD#2  - continue amlodipine    At risk of CASH  - stop bang 4  - obesity  - monitor on tele and nocturnal oxygen    Unequivocal nuclear stress test, followed up with cardiac cath which did NOT reveal any significant obstructive lesions.

## 2020-01-17 NOTE — PHARMACOTHERAPY INTERVENTION NOTE - COMMENTS
Transition of Care education at bedside - medication calendar given to patient Pharmacy fill confirmed.

## 2020-01-17 NOTE — PROVIDER CONTACT NOTE (OTHER) - SITUATION
Informed Dr Dillard that PT reported pt was sweating and c/o not feeling goo and dizzy.  BP while sitting up was 86/55, .  Md further informed that pt remains tachycardic above 110 to 120s

## 2020-01-18 LAB — TROPONIN I SERPL-MCNC: 0 NG/ML — LOW (ref 0.02–0.06)

## 2020-01-18 PROCEDURE — 99233 SBSQ HOSP IP/OBS HIGH 50: CPT

## 2020-01-18 PROCEDURE — 71275 CT ANGIOGRAPHY CHEST: CPT | Mod: 26

## 2020-01-18 PROCEDURE — 93970 EXTREMITY STUDY: CPT | Mod: 26

## 2020-01-18 RX ORDER — SODIUM CHLORIDE 9 MG/ML
1000 INJECTION, SOLUTION INTRAVENOUS
Refills: 0 | Status: DISCONTINUED | OUTPATIENT
Start: 2020-01-18 | End: 2020-01-18

## 2020-01-18 RX ORDER — SODIUM CHLORIDE 9 MG/ML
1000 INJECTION, SOLUTION INTRAVENOUS
Refills: 0 | Status: DISCONTINUED | OUTPATIENT
Start: 2020-01-18 | End: 2020-01-19

## 2020-01-18 RX ADMIN — CELECOXIB 200 MILLIGRAM(S): 200 CAPSULE ORAL at 08:23

## 2020-01-18 RX ADMIN — Medication 1000 MILLIGRAM(S): at 06:26

## 2020-01-18 RX ADMIN — Medication 1000 MILLIGRAM(S): at 16:07

## 2020-01-18 RX ADMIN — Medication 81 MILLIGRAM(S): at 17:53

## 2020-01-18 RX ADMIN — Medication 1000 MILLIGRAM(S): at 06:25

## 2020-01-18 RX ADMIN — Medication 1000 MILLIGRAM(S): at 16:59

## 2020-01-18 RX ADMIN — CELECOXIB 200 MILLIGRAM(S): 200 CAPSULE ORAL at 21:40

## 2020-01-18 RX ADMIN — OXYCODONE HYDROCHLORIDE 10 MILLIGRAM(S): 5 TABLET ORAL at 22:10

## 2020-01-18 RX ADMIN — ATORVASTATIN CALCIUM 40 MILLIGRAM(S): 80 TABLET, FILM COATED ORAL at 21:40

## 2020-01-18 RX ADMIN — OXYCODONE HYDROCHLORIDE 5 MILLIGRAM(S): 5 TABLET ORAL at 00:00

## 2020-01-18 RX ADMIN — SODIUM CHLORIDE 150 MILLILITER(S): 9 INJECTION, SOLUTION INTRAVENOUS at 11:40

## 2020-01-18 RX ADMIN — OXYCODONE HYDROCHLORIDE 10 MILLIGRAM(S): 5 TABLET ORAL at 21:40

## 2020-01-18 RX ADMIN — Medication 81 MILLIGRAM(S): at 06:25

## 2020-01-18 RX ADMIN — PANTOPRAZOLE SODIUM 40 MILLIGRAM(S): 20 TABLET, DELAYED RELEASE ORAL at 06:26

## 2020-01-18 NOTE — PROGRESS NOTE ADULT - ASSESSMENT
POD#2 s/p RIGHT TKR  - Pain control  - Bowel regimen  - PT/OT  - VTE PPx - caprini 8 - ASA BID  - stable for discharge from medical perspective    Night time hypoxemia and tachycardia  patienet was cleared by his cardiologist. Baseline tachy  however, patietn clammy, and hypoxemic.. in view of this... cta now  HLD  - continue statin    Daughter has "pink eye" at home  - unclear if viral or bacterial  - advised patient on contact precautions, he understood, daughter will be staying on different floor in house for at least 2-3 days    HTN  - hold HCTZ  - resume ACEI on POD#2  - continue amlodipine    At risk of CASH  - stop bang 4  - obesity  - monitor on tele and nocturnal oxygen    if negative CTA - send home today

## 2020-01-18 NOTE — PROGRESS NOTE ADULT - SUBJECTIVE AND OBJECTIVE BOX
Procedure: Right TKR  POD #: 3    Pt seen at 830 AM  S: Pt without complaints. No SOB,CP, N/V. Tolerated Diet well.  Pain comfortable (4/10 ) on  Interval Rx.   Had lightheadedness after PT today, P-Ox 90% after walking.  [+BM], + flatus, No abdominal pain.   PT activity yesterday: Stood & stepped / Walked with walker  Pain Rx:  acetaminophen   Tablet .. 1000 milliGRAM(s) Oral every 8 hours  celecoxib 200 milliGRAM(s) Oral every 12 hours  HYDROmorphone  Injectable 0.5 milliGRAM(s) IV Push every 3 hours PRN  oxyCODONE    IR 5 milliGRAM(s) Oral every 3 hours PRN  oxyCODONE    IR 10 milliGRAM(s) Oral every 3 hours PRN    O: General: On exam, No Apparent Distress  Vital Signs Last 24 Hrs  T(C): 36.8 (18 Jan 2020 07:47), Max: 36.8 (17 Jan 2020 23:30)  T(F): 98.3 (18 Jan 2020 07:47), Max: 98.3 (18 Jan 2020 07:47)  HR: 111 (18 Jan 2020 10:44) (94 - 121)  BP: 119/73 (18 Jan 2020 10:44) (99/59 - 128/78)  RR: 16 (18 Jan 2020 07:47) (16 - 18)  SpO2: 97% (18 Jan 2020 10:44) (94% - 98%)    Lungs: BS clear bilat.  Heart: RR&R  [Abdomen]: + BS, soft , benign exam     Ext(Knee): Right Knee ABD Dressing clean, scant blood tinged drainage, & intact; Prineo intact; no  dehiscence; No  cellulitis; MIn - Mod effusion [If effusion > soft ]. Minimal soft tissue swelling knee/thigh;   ROM: Extension XX deg. ; Flexion  90 deg. PROM in chair  Neurologic:  Has sensation over feet & toes bilat. Full AROM bilat feet & toes. EHL/AT = 5/5  Vascular: Feet toes warm, pink. DP = 2+. No calf tenderness bilat..    VTEP: On Bilat. Venodynes + aspirin enteric coated 81 milliGRAM(s) Oral every 12 hours    IActivity in PT yesterday Noted.[Walked XX ft. with walker]. [Sat up for XX hours].  Labs yesterday noted.[Post Op Anemia(min, mod)(tolerated with amb); Chem:  abnormailties if (+)   Hospitalist input noted.    Labs Today:   CBC:                      11.0   12.63 )-----------( 222      ( 17 Jan 2020 07:21 )             31.3     01-17  Chem:   137  |  102  |  18  ----------------------------<  127<H>  3.8   |  29  |  0.91    Primary Orthopedic Assessment:  • Stable from Orthopedic perspective  • Neuro motor exam stable  • Labs: CBC with minimal Post-Op Anemia/ Chem stable      Plan:   • Continue:  PT/OT/WBAT with assistance of a walker/Ice to knee/ Knee ROM / Continue Incentive Spirometer      • Continue DVT prophylaxis as prescribed, including use of compression devices and ankle pumps  • Continue Pain Rx  • Plans per Medicine - Asked to order CT Angio Chest to evaluate for PE considering  and P-Ox 90% after walking & PT. Medicine felt if CT neg, may be discharged. HAd H/O Resting tachycardia Pre-Op and had negative Cardiology work-up.  • Discharge planning – anticipated discharge is Home D/C with Home care & Home PT when medically stable & cleared by PT/OT

## 2020-01-18 NOTE — PROGRESS NOTE ADULT - SUBJECTIVE AND OBJECTIVE BOX
Patient seen and examined at bedside.  overnight housestaff reporting patient desatted over night, requiring 02. pulse ox changed. persistent hypoxemia    today reported patient was diaphoretic and clammy with PT tachycardic on remote pulse ox to 86 percent + 126 bpm, sinus tachy.     In speaking with patient today. Patient denies chest pain shortness of breath or palpitaitons. did report  in view of these occurences will CTA patient. patient agrees    Review of Systems:  General:denies fever chills, headache, weakness  HEENT: denies blurry vision,diffculty swallowing, difficulty hearing, tinnitus  Cardiovascular: denies chest pain  ,palpitations  Pulmonary:denies shortness of breath, cough, wheezing, hemoptysis  Gastrointestinal: denies abdominal pain, constipation, diarrhea,nausea , vomiting, hematochezia  : denies hematuria, dysuria, or incontinence  Neurological: denies weakness, numbness , tingling, dizziness, tremors  MSK: denies muscle pain, difficulty ambulating, swelling, back pain  skin: denies skin rash, itching, burning, or  skin lesions  Psychiatrical: denies mood disturbances, anxierty, feeling depressed, depression , or difficulty sleeping    Objective:  Vitals  T(C): 36.8 (01-18-20 @ 07:47), Max: 36.8 (01-17-20 @ 23:30)  HR: 111 (01-18-20 @ 10:44) (94 - 121)  BP: 119/73 (01-18-20 @ 10:44) (99/59 - 128/78)  RR: 16 (01-18-20 @ 07:47) (16 - 18)  SpO2: 97% (01-18-20 @ 10:44) (94% - 98%)    Physical Exam:  General: comfortable, no acute distress, well nourished  HEENT: Atraumatic, no LAD, trachea midline, PERRLA  Cardiovascular: normal s1s2, tachycardic, no murmurs, gallops or fricition rubs  Pulmonary: clear to ausculation Bilaterally, no wheezing , rhonchi  Gastrointestinal: soft non tender non distended, no masses felt, no organomegally  Muscloskeletal: no lower extremity edema, intact bilateral lower extremity pulses  Neurological: CN II-12 intact. No focal weakness  Psychiatrical: normal mood, cooperative  SKIN: no rash, lesions or ulcers

## 2020-01-19 VITALS — OXYGEN SATURATION: 94 % | HEART RATE: 100 BPM

## 2020-01-19 PROCEDURE — 97535 SELF CARE MNGMENT TRAINING: CPT

## 2020-01-19 PROCEDURE — 97110 THERAPEUTIC EXERCISES: CPT

## 2020-01-19 PROCEDURE — C1713: CPT

## 2020-01-19 PROCEDURE — 85027 COMPLETE CBC AUTOMATED: CPT

## 2020-01-19 PROCEDURE — 97530 THERAPEUTIC ACTIVITIES: CPT

## 2020-01-19 PROCEDURE — 80048 BASIC METABOLIC PNL TOTAL CA: CPT

## 2020-01-19 PROCEDURE — C1776: CPT

## 2020-01-19 PROCEDURE — 36415 COLL VENOUS BLD VENIPUNCTURE: CPT

## 2020-01-19 PROCEDURE — 88311 DECALCIFY TISSUE: CPT

## 2020-01-19 PROCEDURE — 99233 SBSQ HOSP IP/OBS HIGH 50: CPT

## 2020-01-19 PROCEDURE — 97165 OT EVAL LOW COMPLEX 30 MIN: CPT

## 2020-01-19 PROCEDURE — 88305 TISSUE EXAM BY PATHOLOGIST: CPT

## 2020-01-19 PROCEDURE — 97116 GAIT TRAINING THERAPY: CPT

## 2020-01-19 PROCEDURE — 97161 PT EVAL LOW COMPLEX 20 MIN: CPT

## 2020-01-19 PROCEDURE — 84484 ASSAY OF TROPONIN QUANT: CPT

## 2020-01-19 PROCEDURE — 73562 X-RAY EXAM OF KNEE 3: CPT

## 2020-01-19 PROCEDURE — 93970 EXTREMITY STUDY: CPT

## 2020-01-19 PROCEDURE — 71275 CT ANGIOGRAPHY CHEST: CPT

## 2020-01-19 RX ADMIN — Medication 1000 MILLIGRAM(S): at 06:29

## 2020-01-19 RX ADMIN — AMLODIPINE BESYLATE 10 MILLIGRAM(S): 2.5 TABLET ORAL at 06:27

## 2020-01-19 RX ADMIN — Medication 1000 MILLIGRAM(S): at 12:50

## 2020-01-19 RX ADMIN — Medication 81 MILLIGRAM(S): at 06:27

## 2020-01-19 RX ADMIN — LISINOPRIL 20 MILLIGRAM(S): 2.5 TABLET ORAL at 06:27

## 2020-01-19 RX ADMIN — CELECOXIB 200 MILLIGRAM(S): 200 CAPSULE ORAL at 12:27

## 2020-01-19 RX ADMIN — Medication 1000 MILLIGRAM(S): at 06:27

## 2020-01-19 RX ADMIN — CELECOXIB 200 MILLIGRAM(S): 200 CAPSULE ORAL at 12:26

## 2020-01-19 RX ADMIN — POLYETHYLENE GLYCOL 3350 17 GRAM(S): 17 POWDER, FOR SOLUTION ORAL at 12:27

## 2020-01-19 RX ADMIN — PANTOPRAZOLE SODIUM 40 MILLIGRAM(S): 20 TABLET, DELAYED RELEASE ORAL at 06:27

## 2020-01-19 RX ADMIN — SODIUM CHLORIDE 100 MILLILITER(S): 9 INJECTION, SOLUTION INTRAVENOUS at 06:28

## 2020-01-19 NOTE — PROGRESS NOTE ADULT - ASSESSMENT
POD#2 s/p RIGHT TKR  - Pain control  - Bowel regimen  - PT/OT  - VTE PPx - caprini 8 - ASA BID  - stable for discharge from medical perspective    Night time hypoxemia and tachycardia  patienet was cleared by his cardiologist. Baseline tachy  cta primaritly negative although incomplete view of subsegmentals due to contrast load  venous duplex negative  NO further hypoxemia reported  HLD  - continue statin    Daughter has "pink eye" at home  - unclear if viral or bacterial  - advised patient on contact precautions, he understood, daughter will be staying on different floor in house for at least 2-3 days    HTN  - hold HCTZ  - resume ACEI on POD#2  - continue amlodipine    At risk of CASH  - stop bang 4  - obesity  - monitor on tele and nocturnal oxygen    dc planning today

## 2020-01-19 NOTE — PROGRESS NOTE ADULT - SUBJECTIVE AND OBJECTIVE BOX
ORTHOPEDIC PA PROGRESS NOTE  ANDREY VENTURINO      64y Male                                 SY 2WST 229 01                                                                                                                           POD #    4d    STATUS POST:       Procedure: Right total knee replacement             Patient seen and examined at bedside.      Current Pain Management:    acetaminophen   Tablet .. 1000 milliGRAM(s) Oral every 8 hours  celecoxib 200 milliGRAM(s) Oral every 12 hours  HYDROmorphone  Injectable 0.5 milliGRAM(s) IV Push every 3 hours PRN  ondansetron Injectable 4 milliGRAM(s) IV Push every 6 hours PRN  oxyCODONE    IR 5 milliGRAM(s) Oral every 3 hours PRN  oxyCODONE    IR 10 milliGRAM(s) Oral every 3 hours PRN      T(F): 97.7  HR: 96  BP: 122/74  RR: 18  SpO2: 99%               Physical Exam :    -   Dressing changed sterile.   -   Wound C/D/I.   -   Distal Neurvascular status intact grossly.   -   Warm well perfused; capillary refill <3 seconds   -   (+)EHL/FHL   -   (+) Sensation to light touch  -   (-) Calf tenderness Bilaterally      A/P: 64y Male s/p Right total knee replacement     -   Ortho Stable  -   Pain control:  acetaminophen   Tablet .. 1000 milliGRAM(s) Oral every 8 hours  celecoxib 200 milliGRAM(s) Oral every 12 hours  HYDROmorphone  Injectable 0.5 milliGRAM(s) IV Push every 3 hours PRN  ondansetron Injectable 4 milliGRAM(s) IV Push every 6 hours PRN  oxyCODONE    IR 5 milliGRAM(s) Oral every 3 hours PRN  oxyCODONE    IR 10 milliGRAM(s) Oral every 3 hours PRN    -   Medicine to follow  -   DVT ppx:    PAS +  aspirin enteric coated: 81 milliGRAM(s) Oral, aspirin enteric coated: 81 milliGRAM(s) Oral    -   PT/OT OOB,  Weight bearing status: WBAT   -  Dispo:  Home today  -   Prescribed Medications:  3 in 1 commode: s/p Right TKR  to assist with ADLs  aspirin 81 mg oral delayed release tablet: 1 tab(s) orally every 12 hours. Take at least 2 hours before celebrex  CeleBREX 200 mg oral capsule: 1 cap(s) orally 2 times a day. Take at least 2 hours after aspirin  oxyCODONE 5 mg oral tablet: 1 tab(s) orally every 4 hours, As Needed - Pain  MDD:6   Reference #: [id number]   pantoprazole 40 mg oral delayed release tablet: 1 tab(s) orally once a day (before a meal)  rolling walker with 5 inch wheels: s/p Right TKR  to assist with ADLs

## 2020-01-19 NOTE — PROGRESS NOTE ADULT - SUBJECTIVE AND OBJECTIVE BOX
Patient seen and examined at bedside. doing well. no oxygen needed overnight and now  o2 sats very acceptable at 95 percent      feels well. trops negative. venous duplex negative.. ct angiogram incomplete study although have reading on main arteries. negative      Review of Systems:  General:denies fever chills, headache, weakness  HEENT: denies blurry vision,diffculty swallowing, difficulty hearing, tinnitus  Cardiovascular: denies chest pain  ,palpitations  Pulmonary:denies shortness of breath, cough, wheezing, hemoptysis  Gastrointestinal: denies abdominal pain, constipation, diarrhea,nausea , vomiting, hematochezia  : denies hematuria, dysuria, or incontinence  Neurological: denies weakness, numbness , tingling, dizziness, tremors  MSK: denies muscle pain, difficulty ambulating, swelling, back pain  skin: denies skin rash, itching, burning, or  skin lesions  Psychiatrical: denies mood disturbances, anxierty, feeling depressed, depression , or difficulty sleeping    Objective:  Vitals  T(C): 36.9 (01-18-20 @ 23:00), Max: 36.9 (01-18-20 @ 23:00)  HR: 98 (01-19-20 @ 06:23) (98 - 121)  BP: 126/72 (01-19-20 @ 06:23) (119/73 - 134/74)  RR: 16 (01-18-20 @ 23:00) (16 - 16)  SpO2: 95% (01-18-20 @ 23:00) (95% - 100%)    Physical Exam:  General: comfortable, no acute distress, well nourished  HEENT: Atraumatic, no LAD, trachea midline, PERRLA  Cardiovascular: normal s1s2, no murmurs, gallops or fricition rubs  Pulmonary: clear to ausculation Bilaterally, no wheezing , rhonchi  Gastrointestinal: soft non tender non distended, no masses felt, no organomegally  Muscloskeletal: no lower extremity edema, intact bilateral lower extremity pulses  Neurological: CN II-12 intact. No focal weakness  Psychiatrical: normal mood, cooperative  SKIN: no rash, lesions or ulcers      Labs:                    Active Medications  MEDICATIONS  (STANDING):  acetaminophen   Tablet .. 1000 milliGRAM(s) Oral every 8 hours  amLODIPine   Tablet 10 milliGRAM(s) Oral daily  aspirin enteric coated 81 milliGRAM(s) Oral every 12 hours  atorvastatin 40 milliGRAM(s) Oral at bedtime  celecoxib 200 milliGRAM(s) Oral every 12 hours  lactated ringers. 1000 milliLiter(s) (100 mL/Hr) IV Continuous <Continuous>  lisinopril 20 milliGRAM(s) Oral daily  pantoprazole    Tablet 40 milliGRAM(s) Oral before breakfast  polyethylene glycol 3350 17 Gram(s) Oral daily    MEDICATIONS  (PRN):  aluminum hydroxide/magnesium hydroxide/simethicone Suspension 30 milliLiter(s) Oral four times a day PRN Indigestion  bisacodyl Suppository 10 milliGRAM(s) Rectal daily PRN If no bowel movement by postoperative day #2  HYDROmorphone  Injectable 0.5 milliGRAM(s) IV Push every 3 hours PRN Severe Pain (7 - 10)  magnesium hydroxide Suspension 30 milliLiter(s) Oral daily PRN Constipation  ondansetron Injectable 4 milliGRAM(s) IV Push every 6 hours PRN Nausea and/or Vomiting  oxyCODONE    IR 5 milliGRAM(s) Oral every 3 hours PRN Mild Pain (1 - 3)  oxyCODONE    IR 10 milliGRAM(s) Oral every 3 hours PRN Moderate Pain (4 - 6)  senna 2 Tablet(s) Oral at bedtime PRN Constipation Patient seen and examined at bedside. doing well. no oxygen needed overnight and now  o2 sats very acceptable at 95 percent      feels well. trops negative. venous duplex negative.. ct angiogram incomplete study although have reading on main arteries. negative      Review of Systems:  General:denies fever chills, headache, weakness  HEENT: denies blurry vision,diffculty swallowing, difficulty hearing, tinnitus  Cardiovascular: denies chest pain  ,palpitations  Pulmonary:denies shortness of breath, cough, wheezing, hemoptysis  Gastrointestinal: denies abdominal pain, constipation, diarrhea,nausea , vomiting, hematochezia  : denies hematuria, dysuria, or incontinence  Neurological: denies weakness, numbness , tingling, dizziness, tremors  MSK: denies muscle pain, difficulty ambulating, swelling, back pain  skin: denies skin rash, itching, burning, or  skin lesions  Psychiatrical: denies mood disturbances, anxierty, feeling depressed, depression , or difficulty sleeping    Objective:  Vitals  T(C): 36.9 (01-18-20 @ 23:00), Max: 36.9 (01-18-20 @ 23:00)  HR: 98 (01-19-20 @ 06:23) (98 - 121)  BP: 126/72 (01-19-20 @ 06:23) (119/73 - 134/74)  RR: 16 (01-18-20 @ 23:00) (16 - 16)  SpO2: 95% (01-18-20 @ 23:00) (95% - 100%)    Physical Exam:  General: comfortable, no acute distress, well nourished  HEENT: Atraumatic, no LAD, trachea midline, PERRLA  Cardiovascular: normal s1s2, +++murmurs, gallops or fricition rubs ( known)  Pulmonary: clear to ausculation Bilaterally, no wheezing , rhonchi  Gastrointestinal: soft non tender non distended, no masses felt, no organomegally  Muscloskeletal: no lower extremity edema, intact bilateral lower extremity pulses  Neurological: CN II-12 intact. No focal weakness  Psychiatrical: normal mood, cooperative  SKIN: no rash, lesions or ulcers      Labs:                    Active Medications  MEDICATIONS  (STANDING):  acetaminophen   Tablet .. 1000 milliGRAM(s) Oral every 8 hours  amLODIPine   Tablet 10 milliGRAM(s) Oral daily  aspirin enteric coated 81 milliGRAM(s) Oral every 12 hours  atorvastatin 40 milliGRAM(s) Oral at bedtime  celecoxib 200 milliGRAM(s) Oral every 12 hours  lactated ringers. 1000 milliLiter(s) (100 mL/Hr) IV Continuous <Continuous>  lisinopril 20 milliGRAM(s) Oral daily  pantoprazole    Tablet 40 milliGRAM(s) Oral before breakfast  polyethylene glycol 3350 17 Gram(s) Oral daily    MEDICATIONS  (PRN):  aluminum hydroxide/magnesium hydroxide/simethicone Suspension 30 milliLiter(s) Oral four times a day PRN Indigestion  bisacodyl Suppository 10 milliGRAM(s) Rectal daily PRN If no bowel movement by postoperative day #2  HYDROmorphone  Injectable 0.5 milliGRAM(s) IV Push every 3 hours PRN Severe Pain (7 - 10)  magnesium hydroxide Suspension 30 milliLiter(s) Oral daily PRN Constipation  ondansetron Injectable 4 milliGRAM(s) IV Push every 6 hours PRN Nausea and/or Vomiting  oxyCODONE    IR 5 milliGRAM(s) Oral every 3 hours PRN Mild Pain (1 - 3)  oxyCODONE    IR 10 milliGRAM(s) Oral every 3 hours PRN Moderate Pain (4 - 6)  senna 2 Tablet(s) Oral at bedtime PRN Constipation

## 2020-01-21 LAB — SURGICAL PATHOLOGY STUDY: SIGNIFICANT CHANGE UP

## 2020-01-30 ENCOUNTER — APPOINTMENT (OUTPATIENT)
Dept: ORTHOPEDIC SURGERY | Facility: CLINIC | Age: 65
End: 2020-01-30
Payer: OTHER MISCELLANEOUS

## 2020-01-30 PROCEDURE — 99024 POSTOP FOLLOW-UP VISIT: CPT

## 2020-01-30 PROCEDURE — 73560 X-RAY EXAM OF KNEE 1 OR 2: CPT | Mod: RT

## 2020-01-30 NOTE — END OF VISIT
[FreeTextEntry3] : All medical record entries made by the [facility] were at my, Dr. Les Fernández, direction and personally dictated by me on 01/30/2020. I have reviewed the chart and agree that the record accurately reflects my personal performance of the history, physical exam, assessment and plan. I have also personally directed, reviewed, and agreed with the chart.

## 2020-01-30 NOTE — CONSULT LETTER
[Dear  ___] : Dear  [unfilled], [Please see my note below.] : Please see my note below. [I had the pleasure of evaluating your patient, [unfilled].] : I had the pleasure of evaluating your patient, [unfilled]. [Sincerely,] : Sincerely, [FreeTextEntry3] : Dr. Les Fernández

## 2020-01-30 NOTE — ADDENDUM
[FreeTextEntry1] : I, Ana Alejandre, acted solely as a scribe for Dr. Les Fernández on this date 01/30/2020.

## 2020-01-30 NOTE — HISTORY OF PRESENT ILLNESS
[___ Days Post Op] : post op day #[unfilled] [de-identified] : s/p Right total knee arthroplasty on 1/15/2020.  [de-identified] : Right Lower Extremity\par o Knee :\par ¦ Inspection/Palpation : no tenderness to palpation, no swelling, no deformity, surgical incision well healing with Prineo in place, clean, dry, and intact with no discharge or dehiscence. \par ¦ Range of Motion : unable to assess \par ¦ Strength : unable to assess\par o Muscle Bulk : normal muscle bulk present\par o Skin : no erythema, no ecchymosis\par o Sensation : sensation to pin intact\par o Vascular Exam : no edema, no cyanosis, dorsalis pedis artery pulse 2+, posterior tibial artery pulse 2+  [de-identified] : 65 year old male presents for a post operative evaluation s/p Right total knee arthroplasty on 1/15/2020. He presents to the office ambulating with the assistance of a cane. The patient has been doing well post-operatively and reports no pain about his right knee. He only notes sensations of tightness in his right distal quadriceps musculature. He has no signs of infections and denies fever, chills, nausea, or vomiting. Today the patient presents for a wound check, Prineo removal, placement of Steri-Strips, and for XRays of the right knee.  [de-identified] : o Right Knee : AP and lateral views of the knee were obtained, there are no soft tissue abnormalities, no fractures, alignment is normal, normal bone density, no bony lesions, s/p Right TKA with hardware in place and no signs of loosening.  [de-identified] : Patient is to begin physical therapy. A prescription for Physical Therapy was provided. \par \par FU 4 weeks.

## 2020-01-30 NOTE — PROCEDURE
[de-identified] : Right Lower Extremity\par o Knee :\par ¦ Inspection/Palpation : no tenderness to palpation, no swelling, no deformity, surgical incision well healing with Prineo in place, clean, dry, and intact with no discharge or dehiscence. \par ¦ Range of Motion : 0 - 120 degrees, no crepitus\par ¦ Stability : no valgus or varus instability present on provocative testing, Lachman’s Test (-)\par ¦ Strength : flexion and extension 5/5\par o Muscle Bulk : normal muscle bulk present\par o Skin : no erythema, no ecchymosis\par o Sensation : sensation to pin intact\par o Vascular Exam : no edema, no cyanosis, dorsalis pedis artery pulse 2+, posterior tibial artery pulse 2+ \par \par o Right Knee : AP and lateral views of the knee were obtained, there are no soft tissue abnormalities, no fractures, alignment is normal, normal bone density, no bony lesions, s/p Right TKA with hardware in place and no signs of loosening. \par \par FU

## 2020-02-18 ENCOUNTER — APPOINTMENT (OUTPATIENT)
Dept: ORTHOPEDIC SURGERY | Facility: CLINIC | Age: 65
End: 2020-02-18
Payer: OTHER MISCELLANEOUS

## 2020-02-18 ENCOUNTER — LABORATORY RESULT (OUTPATIENT)
Age: 65
End: 2020-02-18

## 2020-02-18 LAB
BASOPHILS # BLD AUTO: 0.05 K/UL
BASOPHILS NFR BLD AUTO: 0.7 %
EOSINOPHIL # BLD AUTO: 0.38 K/UL
EOSINOPHIL NFR BLD AUTO: 5.6 %
ERYTHROCYTE [SEDIMENTATION RATE] IN BLOOD BY WESTERGREN METHOD: 5 MM/HR
HCT VFR BLD CALC: 40.9 %
HGB BLD-MCNC: 13.7 G/DL
IMM GRANULOCYTES NFR BLD AUTO: 0.1 %
LYMPHOCYTES # BLD AUTO: 1.95 K/UL
LYMPHOCYTES NFR BLD AUTO: 28.9 %
MAN DIFF?: NORMAL
MCHC RBC-ENTMCNC: 30.7 PG
MCHC RBC-ENTMCNC: 33.5 GM/DL
MCV RBC AUTO: 91.7 FL
MONOCYTES # BLD AUTO: 0.49 K/UL
MONOCYTES NFR BLD AUTO: 7.3 %
NEUTROPHILS # BLD AUTO: 3.87 K/UL
NEUTROPHILS NFR BLD AUTO: 57.4 %
PLATELET # BLD AUTO: 267 K/UL
RBC # BLD: 4.46 M/UL
RBC # FLD: 14 %
WBC # FLD AUTO: 6.75 K/UL

## 2020-02-18 PROCEDURE — 99024 POSTOP FOLLOW-UP VISIT: CPT

## 2020-02-18 NOTE — ADDENDUM
[FreeTextEntry1] : I, Mayte Lentz wrote this note acting as a scribe for Dr. Helio Ellison on Feb 18, 2020.

## 2020-02-18 NOTE — CONSULT LETTER
[I had the pleasure of evaluating your patient, [unfilled].] : I had the pleasure of evaluating your patient, [unfilled]. [Dear  ___] : Dear  [unfilled], [Sincerely,] : Sincerely, [Please see my note below.] : Please see my note below. [FreeTextEntry2] : RIYA VICENTE  [FreeTextEntry3] : Helio Ellison MD

## 2020-02-18 NOTE — HISTORY OF PRESENT ILLNESS
Burn cleansed with Hibiclens Lot [id number], expiration 3/30/20. Dressed with Silvadene Lot  expiration 10/30/20, covered with Telfa and Tegaderm   [___ Days Post Op] : post op day #[unfilled] [de-identified] : s/p Right total knee arthroplasty on 1/15/2020.  [de-identified] : 65 year old male presents for a post operative evaluation s/p Right total knee arthroplasty. The surgery was performed by Dr. Fernández on 1/15/2020. Last week, while at PT, both he and his physical therapist noticed 2 small areas along the incision with mild serous drainage  .. His therapist became concerned that there was a possible infection and advised that he come in for a wound check. Today he states that there has not been any development of fever or chills. There has been mild erythema surrounding anterior scar area. \par  He has been continuing with home exercises such as leg lifts and flexion based exercises. He will be traveling to [place] this week and will return on Sunday. He is scheduled to see Dr. Fernández next Thursday, 02/27/2020.  [de-identified] : Patient is a [place] male.Alert,oriented.He ambulates  without a cane.\par Right Lower Extremity\par o Knee :\par ¦ Inspection/Palpation : Two  1cm sites of wound dehiscence along the vertical knee incision; one proximal and one along the mid portion. The areas were expressed producing minimal serosanguineous discharge. No signs of purulence. Surrounding skin about the incision has mild erythema. No pain with ROM.\par ¦ Range of Motion : Flexion 110. Extension 0 °. \par ¦ Strength :5/5\par o Muscle Bulk : normal muscle bulk present\par o Skin : no erythema, no ecchymosis\par o Sensation : sensation to pin intact\par o Vascular Exam : no edema, no cyanosis, dorsalis pedis artery pulse 2+, posterior tibial artery pulse 2+  [de-identified] : Reviewed from 01/30/2020: \par o Right Knee : AP and lateral views of the knee were obtained, there are no soft tissue abnormalities, no fractures, alignment is normal, normal bone density, no bony lesions, s/p Right TKA with hardware in place and no signs of loosening.  [de-identified] : A culture swab of of the right knee wounds was sent- both proximal and mid-point wound sites. Patient was also provided with a blood test to assess for CBC, ESR and CRP. He was advised to hold off on PT for the interim and instead focus on remaining active with walking and the current home exercises program.He denies any allergy to medications.He was advised Duricef 500 mg BID x 10 days.Rx was sent to the patient's pharmacy. Patient will followup next week Thursday for his previously scheduled appointment with Dr. Fernández. He is advised to call the office or go to the ER if he develops any fever or chills or if the erythema/drainage progresses.

## 2020-02-19 LAB — CRP SERPL-MCNC: 0.13 MG/DL

## 2020-02-24 ENCOUNTER — LABORATORY RESULT (OUTPATIENT)
Age: 65
End: 2020-02-24

## 2020-02-24 ENCOUNTER — RX RENEWAL (OUTPATIENT)
Age: 65
End: 2020-02-24

## 2020-02-24 ENCOUNTER — APPOINTMENT (OUTPATIENT)
Dept: ORTHOPEDIC SURGERY | Facility: CLINIC | Age: 65
End: 2020-02-24
Payer: OTHER MISCELLANEOUS

## 2020-02-24 VITALS — HEIGHT: 69 IN | WEIGHT: 229 LBS | BODY MASS INDEX: 33.92 KG/M2

## 2020-02-24 PROCEDURE — 99024 POSTOP FOLLOW-UP VISIT: CPT

## 2020-02-24 PROCEDURE — 20610 DRAIN/INJ JOINT/BURSA W/O US: CPT | Mod: 58,RT
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
## 2020-02-24 NOTE — ADDENDUM
[FreeTextEntry1] : I, Estrellita Hurley, acted solely as a scribe for Dr. Les Fernández on this date 02/24/2020

## 2020-02-24 NOTE — PROCEDURE
[de-identified] : At this point I recommended aspiration and under sterile precautions 1 cc of bloody fluid was aspirated from the joint of the  RIGHT knee without complication and sent for culture.

## 2020-02-24 NOTE — END OF VISIT
[FreeTextEntry3] : All medical record entries made by the Tobyibe were at my, Dr. Les Fernández, direction and personally dictated by me on 02/24/2020. I have reviewed the chart and agree that the record accurately reflects my personal performance of the history, physical exam, assessment and plan. I have also personally directed, reviewed, and agreed with the chart.

## 2020-02-27 ENCOUNTER — TRANSCRIPTION ENCOUNTER (OUTPATIENT)
Age: 65
End: 2020-02-27

## 2020-02-27 ENCOUNTER — APPOINTMENT (OUTPATIENT)
Dept: ORTHOPEDIC SURGERY | Facility: CLINIC | Age: 65
End: 2020-02-27
Payer: COMMERCIAL

## 2020-02-27 DIAGNOSIS — M25.561 PAIN IN RIGHT KNEE: ICD-10-CM

## 2020-02-27 DIAGNOSIS — M79.89 OTHER SPECIFIED SOFT TISSUE DISORDERS: ICD-10-CM

## 2020-02-27 PROCEDURE — 99024 POSTOP FOLLOW-UP VISIT: CPT

## 2020-02-27 RX ORDER — AMOXICILLIN 500 MG/1
500 TABLET, FILM COATED ORAL
Qty: 21 | Refills: 0 | Status: DISCONTINUED | COMMUNITY
Start: 2019-11-18
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
## 2020-02-27 NOTE — ADDENDUM
[FreeTextEntry1] : I, Estrellita Hurley, acted solely as a scribe for Dr. Les Fernández on this date 02/27/2020

## 2020-02-27 NOTE — CHART NOTE - NSCHARTNOTEFT_GEN_A_CORE
Post-op exam:  AAO X3, pain is well controlled, lying down flat supine comfortably without orthopnea, obese, no JVD, (++) right ankle soft pitting edema, (+) B/L DPA & PTA pulsations, Heart: normal S1, ACC S2, grade II/VI KWASI max over cardiac base propagating to apex, no extra sounds, Lungs: CTA B/L, Abdomen: soft, obese, NT, ND, BS (+), no palpable masses or organomegaly.  Vitals: HR 83/min, BP 93/58 RR 18/min, SPO@ 96% RA, afebrile.  02-28    141  |  102  |  17  ----------------------------<  169<H>  3.7   |  32<H>  |  1.56<H>    Ca    8.3<L>      28 Feb 2020 20:45                            12.5   13.17 )-----------( 138      ( 28 Feb 2020 20:45 )             38.4         PT/INR - ( 28 Feb 2020 12:17 )   PT: 12.4 sec;   INR: 1.11 ratio    PTT - ( 28 Feb 2020 12:17 )  PTT:31.5 sec      A/P: JEANETTE, hypotension possibly related to pain meds & por PO intake over the whole day, 2000 mL LR bolus over 1 hour now, already on maintenance of 125/min, monitor I & O and trend BUN/Cr.         Thrombocytopenia, platelet count dropped from 279K to 138K, possibly related to infection, no heparin exposure, on low dose Asprin, will continue for now & monitor hemo vac/bleeding while trending platelet count.         Currently on Vancomycin & Zosyn, f/u OR culture results, ID Dr. Aguilar on board.

## 2020-02-27 NOTE — END OF VISIT
[FreeTextEntry3] : All medical record entries made by the [facility] were at my, Dr. Les Fernández, direction and personally dictated by me on 02/27/2020. I have reviewed the chart and agree that the record accurately reflects my personal performance of the history, physical exam, assessment and plan. I have also personally directed, reviewed, and agreed with the chart.

## 2020-02-28 ENCOUNTER — RESULT REVIEW (OUTPATIENT)
Age: 65
End: 2020-02-28

## 2020-02-28 ENCOUNTER — INPATIENT (INPATIENT)
Facility: HOSPITAL | Age: 65
LOS: 4 days | Discharge: HOME | DRG: 486 | End: 2020-03-04
Attending: ORTHOPAEDIC SURGERY | Admitting: ORTHOPAEDIC SURGERY
Payer: COMMERCIAL

## 2020-02-28 VITALS
WEIGHT: 227.96 LBS | HEIGHT: 69 IN | SYSTOLIC BLOOD PRESSURE: 118 MMHG | TEMPERATURE: 98 F | HEART RATE: 106 BPM | RESPIRATION RATE: 19 BRPM | OXYGEN SATURATION: 97 % | DIASTOLIC BLOOD PRESSURE: 68 MMHG

## 2020-02-28 DIAGNOSIS — I10 ESSENTIAL (PRIMARY) HYPERTENSION: ICD-10-CM

## 2020-02-28 DIAGNOSIS — Z98.890 OTHER SPECIFIED POSTPROCEDURAL STATES: Chronic | ICD-10-CM

## 2020-02-28 DIAGNOSIS — M25.461 EFFUSION, RIGHT KNEE: ICD-10-CM

## 2020-02-28 DIAGNOSIS — Z90.49 ACQUIRED ABSENCE OF OTHER SPECIFIED PARTS OF DIGESTIVE TRACT: Chronic | ICD-10-CM

## 2020-02-28 DIAGNOSIS — E78.00 PURE HYPERCHOLESTEROLEMIA, UNSPECIFIED: ICD-10-CM

## 2020-02-28 DIAGNOSIS — Z86.69 PERSONAL HISTORY OF OTHER DISEASES OF THE NERVOUS SYSTEM AND SENSE ORGANS: ICD-10-CM

## 2020-02-28 LAB
ANION GAP SERPL CALC-SCNC: 7 MMOL/L — SIGNIFICANT CHANGE UP (ref 5–17)
ANION GAP SERPL CALC-SCNC: 9 MMOL/L — SIGNIFICANT CHANGE UP (ref 5–17)
APTT BLD: 31.5 SEC — SIGNIFICANT CHANGE UP (ref 28.5–37)
BASOPHILS # BLD AUTO: 0.05 K/UL — SIGNIFICANT CHANGE UP (ref 0–0.2)
BASOPHILS NFR BLD AUTO: 0.6 % — SIGNIFICANT CHANGE UP (ref 0–2)
BUN SERPL-MCNC: 17 MG/DL — SIGNIFICANT CHANGE UP (ref 7–23)
BUN SERPL-MCNC: 20 MG/DL — SIGNIFICANT CHANGE UP (ref 7–23)
CALCIUM SERPL-MCNC: 8.3 MG/DL — LOW (ref 8.4–10.5)
CALCIUM SERPL-MCNC: 8.9 MG/DL — SIGNIFICANT CHANGE UP (ref 8.4–10.5)
CHLORIDE SERPL-SCNC: 102 MMOL/L — SIGNIFICANT CHANGE UP (ref 96–108)
CHLORIDE SERPL-SCNC: 104 MMOL/L — SIGNIFICANT CHANGE UP (ref 96–108)
CO2 SERPL-SCNC: 30 MMOL/L — SIGNIFICANT CHANGE UP (ref 22–31)
CO2 SERPL-SCNC: 32 MMOL/L — HIGH (ref 22–31)
CREAT SERPL-MCNC: 1.19 MG/DL — SIGNIFICANT CHANGE UP (ref 0.5–1.3)
CREAT SERPL-MCNC: 1.56 MG/DL — HIGH (ref 0.5–1.3)
CRP SERPL-MCNC: 0.22 MG/DL — SIGNIFICANT CHANGE UP (ref 0–0.4)
EOSINOPHIL # BLD AUTO: 0.23 K/UL — SIGNIFICANT CHANGE UP (ref 0–0.5)
EOSINOPHIL NFR BLD AUTO: 2.9 % — SIGNIFICANT CHANGE UP (ref 0–6)
ERYTHROCYTE [SEDIMENTATION RATE] IN BLOOD: 3 MM/HR — SIGNIFICANT CHANGE UP (ref 0–9)
GLUCOSE SERPL-MCNC: 106 MG/DL — HIGH (ref 70–99)
GLUCOSE SERPL-MCNC: 169 MG/DL — HIGH (ref 70–99)
HCT VFR BLD CALC: 38.4 % — LOW (ref 39–50)
HCT VFR BLD CALC: 41.6 % — SIGNIFICANT CHANGE UP (ref 39–50)
HGB BLD-MCNC: 12.5 G/DL — LOW (ref 13–17)
HGB BLD-MCNC: 13.5 G/DL — SIGNIFICANT CHANGE UP (ref 13–17)
IMM GRANULOCYTES NFR BLD AUTO: 0.3 % — SIGNIFICANT CHANGE UP (ref 0–1.5)
INR BLD: 1.11 RATIO — SIGNIFICANT CHANGE UP (ref 0.88–1.16)
LACTATE SERPL-SCNC: 2.2 MMOL/L — HIGH (ref 0.7–2)
LYMPHOCYTES # BLD AUTO: 1.81 K/UL — SIGNIFICANT CHANGE UP (ref 1–3.3)
LYMPHOCYTES # BLD AUTO: 22.9 % — SIGNIFICANT CHANGE UP (ref 13–44)
MCHC RBC-ENTMCNC: 29.5 PG — SIGNIFICANT CHANGE UP (ref 27–34)
MCHC RBC-ENTMCNC: 30.2 PG — SIGNIFICANT CHANGE UP (ref 27–34)
MCHC RBC-ENTMCNC: 32.5 GM/DL — SIGNIFICANT CHANGE UP (ref 32–36)
MCHC RBC-ENTMCNC: 32.6 GM/DL — SIGNIFICANT CHANGE UP (ref 32–36)
MCV RBC AUTO: 91 FL — SIGNIFICANT CHANGE UP (ref 80–100)
MCV RBC AUTO: 92.8 FL — SIGNIFICANT CHANGE UP (ref 80–100)
MONOCYTES # BLD AUTO: 0.65 K/UL — SIGNIFICANT CHANGE UP (ref 0–0.9)
MONOCYTES NFR BLD AUTO: 8.2 % — SIGNIFICANT CHANGE UP (ref 2–14)
NEUTROPHILS # BLD AUTO: 5.15 K/UL — SIGNIFICANT CHANGE UP (ref 1.8–7.4)
NEUTROPHILS NFR BLD AUTO: 65.1 % — SIGNIFICANT CHANGE UP (ref 43–77)
NRBC # BLD: 0 /100 WBCS — SIGNIFICANT CHANGE UP (ref 0–0)
NRBC # BLD: 0 /100 WBCS — SIGNIFICANT CHANGE UP (ref 0–0)
PLATELET # BLD AUTO: 138 K/UL — LOW (ref 150–400)
PLATELET # BLD AUTO: 279 K/UL — SIGNIFICANT CHANGE UP (ref 150–400)
POTASSIUM SERPL-MCNC: 3.7 MMOL/L — SIGNIFICANT CHANGE UP (ref 3.5–5.3)
POTASSIUM SERPL-MCNC: 3.8 MMOL/L — SIGNIFICANT CHANGE UP (ref 3.5–5.3)
POTASSIUM SERPL-SCNC: 3.7 MMOL/L — SIGNIFICANT CHANGE UP (ref 3.5–5.3)
POTASSIUM SERPL-SCNC: 3.8 MMOL/L — SIGNIFICANT CHANGE UP (ref 3.5–5.3)
PROTHROM AB SERPL-ACNC: 12.4 SEC — SIGNIFICANT CHANGE UP (ref 10–12.9)
RBC # BLD: 4.14 M/UL — LOW (ref 4.2–5.8)
RBC # BLD: 4.57 M/UL — SIGNIFICANT CHANGE UP (ref 4.2–5.8)
RBC # FLD: 13.6 % — SIGNIFICANT CHANGE UP (ref 10.3–14.5)
RBC # FLD: 13.7 % — SIGNIFICANT CHANGE UP (ref 10.3–14.5)
SODIUM SERPL-SCNC: 141 MMOL/L — SIGNIFICANT CHANGE UP (ref 135–145)
SODIUM SERPL-SCNC: 143 MMOL/L — SIGNIFICANT CHANGE UP (ref 135–145)
WBC # BLD: 13.17 K/UL — HIGH (ref 3.8–10.5)
WBC # BLD: 7.91 K/UL — SIGNIFICANT CHANGE UP (ref 3.8–10.5)
WBC # FLD AUTO: 13.17 K/UL — HIGH (ref 3.8–10.5)
WBC # FLD AUTO: 7.91 K/UL — SIGNIFICANT CHANGE UP (ref 3.8–10.5)

## 2020-02-28 PROCEDURE — 97607 NEG PRS WND THR NDME<=50SQCM: CPT | Mod: 78,RT

## 2020-02-28 PROCEDURE — 99283 EMERGENCY DEPT VISIT LOW MDM: CPT

## 2020-02-28 PROCEDURE — 93971 EXTREMITY STUDY: CPT | Mod: 26,RT

## 2020-02-28 PROCEDURE — 88300 SURGICAL PATH GROSS: CPT | Mod: 26

## 2020-02-28 PROCEDURE — 27486 REVISE/REPLACE KNEE JOINT: CPT | Mod: 78,52,RT

## 2020-02-28 PROCEDURE — 93010 ELECTROCARDIOGRAM REPORT: CPT

## 2020-02-28 PROCEDURE — 99223 1ST HOSP IP/OBS HIGH 75: CPT | Mod: GC

## 2020-02-28 PROCEDURE — 73562 X-RAY EXAM OF KNEE 3: CPT | Mod: 26,RT

## 2020-02-28 RX ORDER — PANTOPRAZOLE SODIUM 20 MG/1
40 TABLET, DELAYED RELEASE ORAL
Refills: 0 | Status: DISCONTINUED | OUTPATIENT
Start: 2020-02-28 | End: 2020-02-28

## 2020-02-28 RX ORDER — OXYCODONE HYDROCHLORIDE 5 MG/1
5 TABLET ORAL
Refills: 0 | Status: DISCONTINUED | OUTPATIENT
Start: 2020-02-28 | End: 2020-03-04

## 2020-02-28 RX ORDER — POLYETHYLENE GLYCOL 3350 17 G/17G
17 POWDER, FOR SOLUTION ORAL DAILY
Refills: 0 | Status: DISCONTINUED | OUTPATIENT
Start: 2020-02-28 | End: 2020-03-04

## 2020-02-28 RX ORDER — MORPHINE SULFATE 50 MG/1
2 CAPSULE, EXTENDED RELEASE ORAL EVERY 4 HOURS
Refills: 0 | Status: DISCONTINUED | OUTPATIENT
Start: 2020-02-28 | End: 2020-02-28

## 2020-02-28 RX ORDER — MOXIFLOXACIN HYDROCHLORIDE TABLETS, 400 MG 400 MG/1
1 TABLET, FILM COATED ORAL
Qty: 0 | Refills: 0 | DISCHARGE

## 2020-02-28 RX ORDER — ATORVASTATIN CALCIUM 80 MG/1
40 TABLET, FILM COATED ORAL AT BEDTIME
Refills: 0 | Status: DISCONTINUED | OUTPATIENT
Start: 2020-02-28 | End: 2020-03-04

## 2020-02-28 RX ORDER — PANTOPRAZOLE SODIUM 20 MG/1
40 TABLET, DELAYED RELEASE ORAL
Refills: 0 | Status: DISCONTINUED | OUTPATIENT
Start: 2020-02-28 | End: 2020-03-04

## 2020-02-28 RX ORDER — SODIUM CHLORIDE 9 MG/ML
1000 INJECTION, SOLUTION INTRAVENOUS
Refills: 0 | Status: DISCONTINUED | OUTPATIENT
Start: 2020-02-28 | End: 2020-02-28

## 2020-02-28 RX ORDER — INFLUENZA VIRUS VACCINE 15; 15; 15; 15 UG/.5ML; UG/.5ML; UG/.5ML; UG/.5ML
0.5 SUSPENSION INTRAMUSCULAR ONCE
Refills: 0 | Status: COMPLETED | OUTPATIENT
Start: 2020-02-28 | End: 2020-02-28

## 2020-02-28 RX ORDER — SODIUM CHLORIDE 9 MG/ML
1000 INJECTION, SOLUTION INTRAVENOUS
Refills: 0 | Status: DISCONTINUED | OUTPATIENT
Start: 2020-02-28 | End: 2020-02-29

## 2020-02-28 RX ORDER — MAGNESIUM HYDROXIDE 400 MG/1
30 TABLET, CHEWABLE ORAL DAILY
Refills: 0 | Status: DISCONTINUED | OUTPATIENT
Start: 2020-02-28 | End: 2020-03-04

## 2020-02-28 RX ORDER — AMLODIPINE BESYLATE 2.5 MG/1
10 TABLET ORAL DAILY
Refills: 0 | Status: DISCONTINUED | OUTPATIENT
Start: 2020-03-01 | End: 2020-03-04

## 2020-02-28 RX ORDER — SENNA PLUS 8.6 MG/1
2 TABLET ORAL AT BEDTIME
Refills: 0 | Status: DISCONTINUED | OUTPATIENT
Start: 2020-02-28 | End: 2020-03-04

## 2020-02-28 RX ORDER — VANCOMYCIN HCL 1 G
1500 VIAL (EA) INTRAVENOUS ONCE
Refills: 0 | Status: COMPLETED | OUTPATIENT
Start: 2020-02-28 | End: 2020-02-28

## 2020-02-28 RX ORDER — ONDANSETRON 8 MG/1
4 TABLET, FILM COATED ORAL EVERY 6 HOURS
Refills: 0 | Status: DISCONTINUED | OUTPATIENT
Start: 2020-02-28 | End: 2020-02-28

## 2020-02-28 RX ORDER — PIPERACILLIN AND TAZOBACTAM 4; .5 G/20ML; G/20ML
3.38 INJECTION, POWDER, LYOPHILIZED, FOR SOLUTION INTRAVENOUS ONCE
Refills: 0 | Status: COMPLETED | OUTPATIENT
Start: 2020-02-28 | End: 2020-02-28

## 2020-02-28 RX ORDER — HYDROMORPHONE HYDROCHLORIDE 2 MG/ML
0.5 INJECTION INTRAMUSCULAR; INTRAVENOUS; SUBCUTANEOUS
Refills: 0 | Status: DISCONTINUED | OUTPATIENT
Start: 2020-02-28 | End: 2020-02-29

## 2020-02-28 RX ORDER — MORPHINE SULFATE 50 MG/1
4 CAPSULE, EXTENDED RELEASE ORAL EVERY 4 HOURS
Refills: 0 | Status: DISCONTINUED | OUTPATIENT
Start: 2020-02-28 | End: 2020-02-28

## 2020-02-28 RX ORDER — ASPIRIN/CALCIUM CARB/MAGNESIUM 324 MG
81 TABLET ORAL EVERY 12 HOURS
Refills: 0 | Status: DISCONTINUED | OUTPATIENT
Start: 2020-02-29 | End: 2020-03-04

## 2020-02-28 RX ORDER — VANCOMYCIN HCL 1 G
1500 VIAL (EA) INTRAVENOUS EVERY 12 HOURS
Refills: 0 | Status: DISCONTINUED | OUTPATIENT
Start: 2020-02-29 | End: 2020-03-01

## 2020-02-28 RX ORDER — HYDROMORPHONE HYDROCHLORIDE 2 MG/ML
0.5 INJECTION INTRAMUSCULAR; INTRAVENOUS; SUBCUTANEOUS
Refills: 0 | Status: DISCONTINUED | OUTPATIENT
Start: 2020-02-28 | End: 2020-03-04

## 2020-02-28 RX ORDER — ACETAMINOPHEN 500 MG
1000 TABLET ORAL EVERY 8 HOURS
Refills: 0 | Status: DISCONTINUED | OUTPATIENT
Start: 2020-03-01 | End: 2020-03-04

## 2020-02-28 RX ORDER — PIPERACILLIN AND TAZOBACTAM 4; .5 G/20ML; G/20ML
3.38 INJECTION, POWDER, LYOPHILIZED, FOR SOLUTION INTRAVENOUS EVERY 8 HOURS
Refills: 0 | Status: COMPLETED | OUTPATIENT
Start: 2020-02-28 | End: 2020-03-04

## 2020-02-28 RX ORDER — ONDANSETRON 8 MG/1
4 TABLET, FILM COATED ORAL ONCE
Refills: 0 | Status: COMPLETED | OUTPATIENT
Start: 2020-02-28 | End: 2020-02-28

## 2020-02-28 RX ORDER — OXYCODONE HYDROCHLORIDE 5 MG/1
10 TABLET ORAL
Refills: 0 | Status: DISCONTINUED | OUTPATIENT
Start: 2020-02-28 | End: 2020-03-04

## 2020-02-28 RX ORDER — ATORVASTATIN CALCIUM 80 MG/1
40 TABLET, FILM COATED ORAL AT BEDTIME
Refills: 0 | Status: DISCONTINUED | OUTPATIENT
Start: 2020-02-28 | End: 2020-02-28

## 2020-02-28 RX ORDER — ONDANSETRON 8 MG/1
4 TABLET, FILM COATED ORAL EVERY 6 HOURS
Refills: 0 | Status: DISCONTINUED | OUTPATIENT
Start: 2020-02-28 | End: 2020-03-04

## 2020-02-28 RX ORDER — AMLODIPINE BESYLATE 2.5 MG/1
10 TABLET ORAL DAILY
Refills: 0 | Status: DISCONTINUED | OUTPATIENT
Start: 2020-02-28 | End: 2020-02-28

## 2020-02-28 RX ORDER — ACETAMINOPHEN 500 MG
1000 TABLET ORAL EVERY 6 HOURS
Refills: 0 | Status: COMPLETED | OUTPATIENT
Start: 2020-02-29 | End: 2020-02-29

## 2020-02-28 RX ORDER — LISINOPRIL 2.5 MG/1
20 TABLET ORAL DAILY
Refills: 0 | Status: DISCONTINUED | OUTPATIENT
Start: 2020-02-28 | End: 2020-02-28

## 2020-02-28 RX ADMIN — HYDROMORPHONE HYDROCHLORIDE 0.5 MILLIGRAM(S): 2 INJECTION INTRAMUSCULAR; INTRAVENOUS; SUBCUTANEOUS at 21:03

## 2020-02-28 RX ADMIN — PIPERACILLIN AND TAZOBACTAM 200 GRAM(S): 4; .5 INJECTION, POWDER, LYOPHILIZED, FOR SOLUTION INTRAVENOUS at 20:45

## 2020-02-28 RX ADMIN — HYDROMORPHONE HYDROCHLORIDE 0.5 MILLIGRAM(S): 2 INJECTION INTRAMUSCULAR; INTRAVENOUS; SUBCUTANEOUS at 21:33

## 2020-02-28 RX ADMIN — HYDROMORPHONE HYDROCHLORIDE 0.5 MILLIGRAM(S): 2 INJECTION INTRAMUSCULAR; INTRAVENOUS; SUBCUTANEOUS at 20:48

## 2020-02-28 RX ADMIN — SODIUM CHLORIDE 75 MILLILITER(S): 9 INJECTION, SOLUTION INTRAVENOUS at 22:07

## 2020-02-28 RX ADMIN — SODIUM CHLORIDE 75 MILLILITER(S): 9 INJECTION, SOLUTION INTRAVENOUS at 20:09

## 2020-02-28 RX ADMIN — Medication 300 MILLIGRAM(S): at 20:46

## 2020-02-28 RX ADMIN — ONDANSETRON 4 MILLIGRAM(S): 8 TABLET, FILM COATED ORAL at 20:08

## 2020-02-28 NOTE — ED ADULT NURSE NOTE - PMH
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

## 2020-02-28 NOTE — ED PROVIDER NOTE - SKIN, MLM
slight faint erythema surrounding surgical site to right knee (no red streaking, no tenderness to palpation to surgical scar). small opening over superior aspect of surgical scar with serosanguinous drainage on bandage

## 2020-02-28 NOTE — CONSULT NOTE ADULT - SUBJECTIVE AND OBJECTIVE BOX
History of Present Illness: The patient is a 65 year old male with a history of HTN, HL, right TKR who presents with drainage from the knee. He denies chest pain or shortness of breath. As per wife, when he walks up stairs he gets short of breath at times. He had an echo in the past revealing normal LV systolic function, mild to mod AR. He had a cardiac cath 12/19 with minimal non-obstructive CAD.    Past Medical/Surgical History:  HTN, HL, right TKR    Medications:  MEDICATIONS  (STANDING):  amLODIPine   Tablet 10 milliGRAM(s) Oral daily  atorvastatin 40 milliGRAM(s) Oral at bedtime  influenza   Vaccine 0.5 milliLiter(s) IntraMuscular once  lactated ringers. 1000 milliLiter(s) (125 mL/Hr) IV Continuous <Continuous>  lisinopril 20 milliGRAM(s) Oral daily  pantoprazole    Tablet 40 milliGRAM(s) Oral before breakfast    MEDICATIONS  (PRN):  morphine IVPB 2 milliGRAM(s) IV Intermittent every 4 hours PRN Moderate Pain (4 - 6)  morphine IVPB 4 milliGRAM(s) IV Intermittent every 4 hours PRN Severe Pain (7 - 10)  ondansetron Injectable 4 milliGRAM(s) IV Push every 6 hours PRN Nausea and/or Vomiting      Family History: Non-contributory family history of premature cardiovascular atherosclerotic disease    Social History: No tobacco, alcohol or drug use    Review of Systems:  General: No fevers, chills, weight loss or gain  Skin: No rashes, color changes  Cardiovascular: No chest pain, orthopnea  Respiratory: No shortness of breath, cough  Gastrointestinal: No nausea, abdominal pain  Genitourinary: No incontinence, pain with urination  Musculoskeletal: No pain, swelling, decreased range of motion  Neurological: No headache, weakness  Psychiatric: No depression, anxiety  Endocrine: No weight loss or gain, increased thirst  All other systems are comprehensively negative.    Physical Exam:  Vitals:        Vital Signs Last 24 Hrs  T(C): 36.7 (28 Feb 2020 11:21), Max: 36.7 (28 Feb 2020 11:21)  T(F): 98 (28 Feb 2020 11:21), Max: 98 (28 Feb 2020 11:21)  HR: 106 (28 Feb 2020 11:21) (106 - 106)  BP: 118/68 (28 Feb 2020 11:21) (118/68 - 118/68)  BP(mean): --  RR: 19 (28 Feb 2020 11:21) (19 - 19)  SpO2: 97% (28 Feb 2020 11:21) (97% - 97%)  General: NAD  HEENT: MMM  Neck: No JVD, no carotid bruit  Lungs: CTAB  CV: RRR, nl S1/S2, no M/R/G  Abdomen: S/NT/ND, +BS  Extremities: No LE edema, no cyanosis  Neuro: AAOx3, non-focal  Skin: No rash    Labs:                        13.5   7.91  )-----------( 279      ( 28 Feb 2020 12:11 )             41.6     02-28    143  |  104  |  20  ----------------------------<  106<H>  3.8   |  30  |  1.19    Ca    8.9      28 Feb 2020 12:11          PT/INR - ( 28 Feb 2020 12:17 )   PT: 12.4 sec;   INR: 1.11 ratio         PTT - ( 28 Feb 2020 12:17 )  PTT:31.5 sec    ECG: Sinus tachycardia, LAD, no ST abnormality

## 2020-02-28 NOTE — H&P ADULT - NSHPPHYSICALEXAM_GEN_ALL_CORE
PHYSICAL EXAM:  Vital Signs Last 24 Hrs  T(C): 36.7 (28 Feb 2020 11:21), Max: 36.7 (28 Feb 2020 11:21)  T(F): 98 (28 Feb 2020 11:21), Max: 98 (28 Feb 2020 11:21)  HR: 106 (28 Feb 2020 11:21) (106 - 106)  BP: 118/68 (28 Feb 2020 11:21) (118/68 - 118/68)  BP(mean): --  RR: 19 (28 Feb 2020 11:21) (19 - 19)  SpO2: 97% (28 Feb 2020 11:21) (97% - 97%)    GENERAL: right knee swelling/pain  HEAD:  Atraumatic, Normocephalic  EYES: EOMI, PERRLA, conjunctiva and sclera clear  ENMT: No tonsillar erythema, exudates, or enlargement; Moist mucous membranes, Good dentition, No lesions  NECK: Supple, No JVD, Normal thyroid  NERVOUS SYSTEM:  Alert & Oriented X3,  CHEST/LUNG: Clear to auscultation bilaterally; No rales, rhonchi, wheezing, or rubs  HEART: Regular rate and rhythm; No murmurs, rubs, or gallops  ABDOMEN: Soft, Nontender, Nondistended; Bowel sounds present  EXTREMITIES:  2+ Peripheral Pulses, No clubbing, cyanosis, or edema  LYMPH: No lymphadenopathy noted  SKIN: No rashes or lesions

## 2020-02-28 NOTE — CONSULT NOTE ADULT - SUBJECTIVE AND OBJECTIVE BOX
HPI:  64yo M with pmh of htn, hld gerd, s/p RIGHT TKR Wyatt 15 2020 by Dr. Fernández presented to the hospital with infected surgical wound with drainage about 10 days ago. Had knee aspirated which was negative but culture from wound drainage on 2/24/20 grew out few pseudomonas. Antibiotic was switched to Cipro and pt now admitted or I&D with planned wash out . No fevers, chills, n/v/d CP SOB. Denies trauma or Fall.    Infectious DIsease consult was called to evaluate pt and for antibiotic management.    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 , Being monitored by ophthalmologist , eye injections every 10 weeks and Laser Rx  Bladder polyp: 2010 being monitored by urologist  Osteoarthritis of right knee  High cholesterol  HTN (hypertension)  S/P reconstruction procedure: left arm reconstruction surgery s/p laceration of left forearm with powersaw  2010  History of cholecystectomy: 2016  H/O hernia repair: bilateral inguinal hernia 2018      Social History--  EtOH: denies   Tobacco: denies  Drug Use: denies     FAMILY HISTORY:  Family history of sarcoidosis: father  Family history of lung disease: mesothelioma      Allergies  No Known Allergies    Intolerances  NONE      Home/ Out patient  Medications :    Current Inpatient Medications :    ANTIBIOTICS:   piperacillin/tazobactam IVPB.. 3.375 Gram(s) IV Intermittent every 8 hours      OTHER RELEVANT MEDICATIONS :  aluminum hydroxide/magnesium hydroxide/simethicone Suspension 30 milliLiter(s) Oral four times a day PRN  atorvastatin 40 milliGRAM(s) Oral at bedtime  HYDROmorphone  Injectable 0.5 milliGRAM(s) IV Push every 10 minutes PRN  HYDROmorphone  Injectable 0.5 milliGRAM(s) IV Push every 3 hours PRN  influenza   Vaccine 0.5 milliLiter(s) IntraMuscular once  lactated ringers. 1000 milliLiter(s) IV Continuous <Continuous>  lactated ringers. 1000 milliLiter(s) IV Continuous <Continuous>  magnesium hydroxide Suspension 30 milliLiter(s) Oral daily PRN  ondansetron Injectable 4 milliGRAM(s) IV Push every 6 hours PRN  oxyCODONE    IR 5 milliGRAM(s) Oral every 3 hours PRN  oxyCODONE    IR 10 milliGRAM(s) Oral every 3 hours PRN  pantoprazole    Tablet 40 milliGRAM(s) Oral before breakfast  polyethylene glycol 3350 17 Gram(s) Oral daily  senna 2 Tablet(s) Oral at bedtime    ROS:  CONSTITUTIONAL:  Negative fever or chills, feels well, good appetite  EYES:  Negative  blurry vision or double vision  CARDIOVASCULAR:  Negative for chest pain or palpitations  RESPIRATORY:  Negative for cough, wheezing, or SOB   GASTROINTESTINAL:  Negative for nausea, vomiting, diarrhea, constipation, or abdominal pain  GENITOURINARY:  Negative frequency, urgency , dysuria or hematuria   NEUROLOGIC:  No headache, confusion, dizziness, lightheadedness  All other systems were reviewed and are negative        I&O's Detail    28 Feb 2020 07:01  -  28 Feb 2020 22:43  --------------------------------------------------------  IN:    IV PiggyBack: 100 mL    lactated ringers.: 125 mL    lactated ringers.: 1425 mL  Total IN: 1650 mL    OUT:    Estimated Blood Loss: 25 mL  Total OUT: 25 mL    Total NET: 1625 mL  Physical Exam:  Vital Signs Last 24 Hrs  T(C): 36.9 (28 Feb 2020 19:59), Max: 36.9 (28 Feb 2020 19:59)  T(F): 98.5 (28 Feb 2020 19:59), Max: 98.5 (28 Feb 2020 19:59)  HR: 83 (28 Feb 2020 21:54) (76 - 106)  BP: 93/58 (28 Feb 2020 21:54) (91/53 - 141/56)  RR: 18 (28 Feb 2020 21:54) (9 - 20)  SpO2: 96% (28 Feb 2020 21:54) (94% - 99%)  Height (cm): 175.26 (02-28 @ 14:44)  Weight (kg): 103.4 (02-28 @ 14:44)  BMI (kg/m2): 33.7 (02-28 @ 14:44)  BSA (m2): 2.18 (02-28 @ 14:44)    General: well developed well nourished, in no acute distress  Eyes: sclera anicteric, pupils equal and reactive to light  ENMT: buccal mucosa moist, pharynx not injected  Neck: supple, trachea midline  Lungs: clear, no wheeze/rhonchi  Cardiovascular: regular rate and rhythm, S1 S2  Abdomen: soft, nontender, no organomegaly present, bowel sounds normal  Neurological:  alert and oriented x3, Cranial Nerves II-XII grossly intact  Skin:no increased ecchymosis/petechiae/purpura  Lymph Nodes: no palpable cervical/supraclavicular lymph nodes enlargements  Extremities: no cyanosis/clubbing/edema    Labs:                         12.5   13.17 )-----------( 138      ( 28 Feb 2020 20:45 )             38.4   02-28    141  |  102  |  17  ----------------------------<  169<H>  3.7   |  32<H>  |  1.56<H>    Ca    8.3<L>      28 Feb 2020 20:45        RECENT CULTURES:  pending      RADIOLOGY & ADDITIONAL STUDIES:    Assessment :   64yo M with pmh of htn, hld gerd, s/p RIGHT TKR Wyatt 15 2020 by Dr. Fernández presented to the hospital with infected surgical wound with drainage about 10 days ago. Had knee aspirated which was negative but culture from wound drainage on 2/24/20 grew out few pseudomonas. Antibiotic was switched to Cipro and pt now admitted or I&D with planned wash out. Rule out septic prosthetic joint.    Plan :   Hold antibiotic until after surgery then start Vanc Zosyn  Fu OR cultures  To OR for I&D and joint wash out    D/w DR Fernández and PA    Continue with present regiment .  Approptiate use of antibiotics and adverse effects reviewed.      I have discussed the above plan of care with patient/family in detail. They expressed understanding of the treatment plan . Risks, benefits and alternatives discussed in detail. I have asked if they have any questions or concerns and appropriately addressed them to the best of my ability .      > 45 minutes spent in direct patient care reviewing  the notes, lab data/ imaging , discussion with multidisciplinary team. All questions were addressed and answered to the best of my capacity .    Thank you for allowing me to participate in the care of your patient .      Candis Aguilar MD  Infectious Disease  [phone]

## 2020-02-28 NOTE — ED ADULT NURSE NOTE - CHPI ED NUR SYMPTOMS NEG
no difficulty bearing weight/no deformity/no bruising/no numbness/no weakness/no stiffness/no back pain/no fever/no tingling/no abrasion

## 2020-02-28 NOTE — H&P ADULT - NSICDXFAMILYHX_GEN_ALL_CORE_FT
FAMILY HISTORY:  Family history of lung disease, mesothelioma  Family history of sarcoidosis, father

## 2020-02-28 NOTE — H&P ADULT - PROBLEM SELECTOR PLAN 1
scheduled for right knee washed out in the OR  obtain culture from the office  will start on abx post washout  vanco and zosyn  ID consulted lee  no medical contraindication to proceed for washout

## 2020-02-28 NOTE — H&P ADULT - HISTORY OF PRESENT ILLNESS
64yo M with pmh of htn, hld gerd,  admitted s/p RIGHT TKR Wyatt 15, presented to be admitted to have right knee washed out in the OR. Had right knee replacement on Jan 15th by Dr. Best. Was started on Abx 1.5-2 weeks ago (unsure name, still taking). Drainage from small incision hole started last week. no fevers, chills, or body aches. was seen by ortho in office yesterday and told to come to ED today to have knee washed out. no current pain. last meal last evening 8:00 pm (hot dog and beans). took meds this am around 9-9:30. 64yo M with pmh of htn, hld gerd,  admitted s/p RIGHT TKR Wyatt 15, presented to be admitted to have right knee washed out in the OR. Had right knee replacement on Jan 15th by Dr. Fernández. Noticed drainage from surgical incision about 10 days ago. was seen by Dr. Fernández in the office, knee fluid aspirated and sent for cultures on 2/24/20 grew out few pseudomonas. Was started on Abx 1.5-2 weeks ago (cipro?). . No fevers, chills, or body aches. Was seen in office yesterday and told to come to ED today to have knee washed out. no current pain. last meal last evening 8:00 pm (hot dog and beans). took meds this am around 9-9:30.

## 2020-02-28 NOTE — ED PROVIDER NOTE - CLINICAL SUMMARY MEDICAL DECISION MAKING FREE TEXT BOX
drainage from right knee since last week. recent right knee replacement last month. told to come to ED to be admitted to surgical wash out. will order pre-admit labs, NPO. consult ortho drainage from right knee since last week. recent right knee replacement last month. told to come to ED to be admitted to surgical wash out. will order pre-admit labs, NPO. consult ortho. patient also reports recent 8 hour car ride over the past weekend. will order doppler to eval for DVT

## 2020-02-28 NOTE — H&P ADULT - NSHPLABSRESULTS_GEN_ALL_CORE
13.5   7.91  )-----------( 279      ( 28 Feb 2020 12:11 )             41.6       02-28    143  |  104  |  20  ----------------------------<  106<H>  3.8   |  30  |  1.19    Ca    8.9      28 Feb 2020 12:11                    PT/INR - ( 28 Feb 2020 12:17 )   PT: 12.4 sec;   INR: 1.11 ratio         PTT - ( 28 Feb 2020 12:17 )  PTT:31.5 sec    Lactate Trend  02-28 @ 12:11 Lactate:2.2             CAPILLARY BLOOD GLUCOSE 13.5   7.91  )-----------( 279      ( 28 Feb 2020 12:11 )             41.6       02-28    143  |  104  |  20  ----------------------------<  106<H>  3.8   |  30  |  1.19    Ca    8.9      28 Feb 2020 12:11            ESR/CRP: pending         PT/INR - ( 28 Feb 2020 12:17 )   PT: 12.4 sec;   INR: 1.11 ratio         PTT - ( 28 Feb 2020 12:17 )  PTT:31.5 sec    Lactate Trend  02-28 @ 12:11 Lactate:2.2             CAPILLARY BLOOD GLUCOSE

## 2020-02-28 NOTE — ED ADULT NURSE NOTE - OBJECTIVE STATEMENT
Patient ambulatory into ED states he is here to have his right knee drained by Dr. Fernández. Patient is post-op right knee replacement 1/15/2020. With infection for the past 2 weeks treated with antibiotic, switched 1 week to a new type. Patient was seen yesterday by Dr. Fernández and was told to come here today to have the fluid drained. Patient noted to have swelling to knee. Incision with some areas of scabbing and noted to have small open area proximal incision draining serosanguinous fluid. Patient ambulatory into ED states he is here to have his right knee drained by Dr. Fernández. Patient is post-op right knee replacement 1/15/2020. With infection for the past 2 weeks treated with antibiotic, switched 1 week to a new type. Patient was seen yesterday by Dr. Fernández and was told to come here today to have the fluid drained. Patient noted to have swelling to knee. Incision with some areas of scabbing and noted to have small open area proximal incision draining serosanguinous fluid. Patient noted to have swelling to right calf as well, denies any tenderness or pain to knee or calf. Patient had 11 hour car rides back and forth to [place] over last weekend.

## 2020-02-28 NOTE — H&P ADULT - ASSESSMENT
66yo M with pmh of htn, hld gerd,  admitted s/p RIGHT TKR Wyatt 15, presented to be admitted to have right knee washed out in the OR. Had right knee replacement on Jan 15th by Dr. Best. 66yo M with pmh of htn, hld gerd,  admitted s/p RIGHT TKR Wyatt 15 with surgical incision drainage  - Admit to ortho  - NPO for OR today  - IVF  - pain control   - hold antibiotics until OR cultures are taken  - Medical clearance ( Dr. Russell)  - Cardiology clearance (Dr. Castro)  - case d/w Dr. Fernández

## 2020-02-28 NOTE — BRIEF OPERATIVE NOTE - NSICDXBRIEFPROCEDURE_GEN_ALL_CORE_FT
PROCEDURES:  Incision and drainage of knee with polyethylene liner exchange following total arthroplasty 28-Feb-2020 20:10:00  Estuardo Cooley

## 2020-02-28 NOTE — ED PROVIDER NOTE - RELIEVING FACTORS
Encounter Summary

 Created on:2019



Patient:Lynn Davis

Sex:Female

:2000

External Reference #:[id number]





Demographics







 Address  [facility] [place] [address]

 

 Home Phone  [phone]

 

 Preferred Language  English

 

 Marital Status  Never 

 

 [employer] Affiliation  Unknown

 

 Race  White

 

 Ethnic Group   or /Canadian









Author







Reason for Visit







 Follow Up Visit







Instructions







         1. Dysuria

 

              painful urination (dysuria): care instructions

 

              Pyridium 200 mg tablet



Discussion Note: None recorded.



Plan of Care







 Reminders      Provider



       

 

      Appointments            None              



   recorded.    

 

      Lab            None              



   recorded.    

 

      Referral            None              



   recorded.    

 

      Procedures            None              



   recorded.    

 

      Surgeries            None              



   recorded.    

 

      Imaging            None              



   recorded.    







Medications







 Name  Start Date  









 Pyridium 200 mg tablet  



  Take 1 tablet 3 times a day by oral route.  



  take for painful urination will turn urine orange  







Medications Administered

 None recorded.



Vitals







 Height  Weight  BMI  Blood Pressure

 

  62 in   91 lbs   16.6 kg/m2   106/68 mm[Hg]







Lab Results







 Date  Name  Specimen  Result  Interpretation  Description  Value  Range  
Status  Address  



                     



                     



                     



                     



                     









 2019  CBC W/    Normal     White Blood  10.2 K/uL  4.0-11.5  Final  
[facility]



   Auto Diff        Count    K/uL    [facility]



                   (Lab): 104



                   [address] [place] [address]



                   

 

       Normal     Red Blood  4.23 M/uL  3.80-5.20  Final  [facility]



           Count    M/uL    [facility]



                   (Lab): 104



                   [address] [place] [address]



                   

 

       Normal     Hemoglobin  14.3 g/dL  10.5-15.7  Final  [facility]



               g/dL    [facility]



                   (Lab): 104



                   [address] [place] [address]



                   

 

       Normal     Hematocrit  39.5 %  34.0-50.0  Final  [facility]



               %    [facility]



                   Center



                   (Lab): 104



                   [address] [place] [address]



                   

 

       Normal     Mean  93.3 fL  78-98 fL  Final  [facility]



           Corpuscular        [facility]



           Volume        [facility]



                   (Lab): 104



                   [address] [place] [address]



                   

 

       High     Mean  33.8 pg  26.2-33.4  Final  [facility]



           Corpuscular    pg    [facility]



           Hemoglobin        [facility]



                   Center



                   (Lab): 104



                   [address] [place] [address]



                   

 

       Normal     Mean  36.2 g/dL  31.5-36.2  Final  [facility]



           Corpuscular    g/dL    [facility]



           [facility]



                   (Lab): 104



                   [address] [place] [address]



                   

 

       Low     Red Cell  11.2 %  11.5-15.5  Final  [facility]



           Distribution    %    [facility]



           Width        [facility]



                   (Lab): 104



                   [address] [place] [address]



                   

 

       Normal     Platelet  300 K/uL  137-338  Final  [facility]



           Count    K/uL    [facility]



                   (Lab): 104



                   [address] [place] [address]



                   

 

       Low     Mean Platelet  7.2 fL  8.4-11.8  Final  [facility]



           Volume    fL    [facility]



                   (Lab): 104



                   [address] [place] [address]



                   

 

       Normal     Neutrophils %  66.2 %  44.4-80.1  Corrected  [facility]



               %    [facility]



                   (Lab): 104



                   [address] [place] [address]



                   

 

       Normal     Lymphocyte%  28.1 %  10.0-50.0  Final  [facility]



               %    [facility]



                   (Lab): 104



                   [address] [place] [address]



                   

 

       Normal     Mono %  4.9 %  3.6-12.04  Final  [facility]



               %    [facility]



                   (Lab): 104



                   [address] [place] [address]



                   

 

       Normal     Eos %  0.3 %  0.0-5.41  Final  [facility]



               %    [facility]



                   (Lab): 104



                   [address] [place] [address]



                   

 

       Normal     Basophil %  0.6 %  0.0-0.79  Final  [facility]



               %    [facility]



                   (Lab): 104



                   [address] [place] [address]



                   

 

 2019  hCG,    Normal     HCG  negative  neg  Final  [facility]



   Qualitativ        Qualitative,se        [facility]



   e, Serum        [facility]



                   (Lab): 104



                   [address] [place] [address]



                   

 

 2019  CMP, Serum    High     Glucose  118 mg/dL    Final  [facility]



   or Plasma            mg/dL    [facility]



                   (Lab): 104



                   [address] [place] [address]



                   

 

       Normal     Blood Urea  9 mg/dL  6-20  Final  [facility]



           Nitrogen    mg/dL    [facility]



                   (Lab): 104



                   [address] [place] [address]



                   

 

       Low     Osmolality  276  280-300  Final  [facility]



           Calculated,        [facility]



                   (Lab): 104



                   [address] [place] [address]



                   

 

       Normal     Creatinine  0.5 mg/dL  0.50-0.90  Final  [facility]



               mg/dL    [facility]



                   (Lab): 104



                   [address] [place] [address]



                   

 

       Normal     Glomerular  >60.00    Final  [facility]



           Filtration        [facility]



                   (Lab): 104



                   [address] [place] [address]



                   

 

       Normal     BUN/creatinin  18.0  12-20  Final  [facility]



           e Ratio        [facility]



                   (Lab): 104



                   [address] [place] [address]



                   

 

       Normal     Sodium Level  138  135-145  Final  [facility]



             mmol/L  mmol/L    [facility]



                   Center



                   (Lab): 104



                   [address] [place] [address]



                   

 

       Low     Potassium  3.1  3.5-5.2  Final  [facility]



           Level  mmol/L  mmol/L    [facility]



                   (Lab): 104



                   [address] [place] [address]



                   

 

       Normal     Chloride  99 mmol/L    Final  [facility]



           Level    mmol/L    [facility]



                   (Lab): 104



                   [address] [place] [address]



                   

 

       Low     Co2  18 mmol/L  21-32  Final  [facility]



               mmol/L    [facility]



                   (Lab): 104



                   [address] [place] [address]



                   

 

       High     Anion Gap  24.1  12-20  Final  [facility]



             mEq/L  mEq/L    [facility]



                   (Lab): 104



                   [address] [place] [address]



                   

 

       Normal     Calcium Level  9.3 mg/dL  8.6-10.0  Final  [facility]



               mg/dL    [facility]



                   (Lab): 104



                   [address] [place] [address]



                   

 

       Normal     Total Protein  8.1 g/dL  6.6-8.7  Final  [facility]



               g/dL    [facility]



                   (Lab): 104



                   [address] [place] [address]



                   

 

       Normal     Albumin  5.1 g/dL  3.5-5.2  Final  [facility]



               g/dL    [facility]



                   (Lab): 104



                   [address] [place] [address]



                   

 

       Normal     Globulin  3.0 gm/dL    Final  [facility]



                   (Lab): 104



                   [address] [place] [address]



                   

 

       Normal     A/g Ratio  1.7  >1.0  Final  [facility]



                   (Lab): 104



                   [address] [place] [address]



                   

 

       Normal     Bilirubin,tot  0.6 mg/dL  0.0-1.2  Final  [facility]



           al    mg/dL    [facility]



                   (Lab): 104



                   [address] [place] [address]



                   

 

       Normal     AST/SGOT  21 U/L  15-32 U/L  Final  [facility]



                   (Lab): 104



                   [address] [place] [address]



                   

 

       Normal     ALT/SGPT  15 U/L  0-33 U/L  Final  [facility]



                   (Lab): 104



                   [address] [place] [address]



                   

 

       Normal     Alkaline  90 U/L    Final  [facility]



           Phosphatase,    U/L    [facility]



                   (Lab): 104



                   [address] [place] [address]



                   

 

 2019  Salicylate    Low     Salicylate  <0.4  2.8-20.0  Final  [facility]



   ,        Level  mg/dL  mg/dL    [facility]                Center



                   (Lab): 104



                   [address] [place] [address]



                   

 

 2019  Acetaminop    Low     Acetaminophen  <5.0  10.0-30.0  Final  
[facility]



   hen, Serum        Level  ug/mL  ug/mL    [facility]



                   Center



                   (Lab): 104



                   [address] [place] [address]



                   

 

 2019  Ethanol,    High     Alcohol Level  184.0  0.00-10.1  Final  
[facility]



   Quantitati          mg/dL  mg/dL    [facility]



   ve, Serum                [facility]



   or Plasma                Center



                   (Lab): 104



                   [address] [place] [address]



                   

 

 2019  Urinalysis    Normal     Color, Urine  light    Final  [facility]



   , Complete          yellow      [facility]



                   (Lab): 104



                   [address] [place] [address]



                   

 

       Normal     Appearance,  clear  clear  Final  [facility]



           Urine        [facility]



                   (Lab): 104



                   [address] [place] [address]



                   

 

       Normal     Urine Glucose  negative  negative  Final  [facility]



                   [facility]



                   (Lab): 104



                   [address] [place] [address]



                   

 

       Normal     Bilirubin,  negative  negative  Final  [facility]



           Urine        [facility]



                   (Lab): 104



                   [address] [place] [address]



                   

 

       Normal     Ketone, Urine  trace  negative  Final  [facility]



                   (Lab): 104



                   [address] [place] [address]



                   

 

       Normal     Specific  1.009  1.003-1.0  Final  [facility]



           [place],urine    30    [facility]



                   (Lab): 104



                   [address] [place] [address]



                   

 

       Normal     Blood Urine  trace  negative  Final  [facility]



                   (Lab): 104



                   [address] [place] [address]



                   

 

       Normal     pH,urine  5.500  5-9  Final  [facility]



                   (Lab): 104



                   [address] [place] [address]



                   

 

       Normal     Protein Urine  negative  negative  Final  [facility]



           (UA)        [facility]



                   (Lab): 104



                   [address] [place] [address]



                   

 

       Normal     Urobilinogen,  normal  0.2-1.0  Final  [facility]



           Urine  mg/dL  mg/dL    [facility]



                   Center



                   (Lab): 104



                   [address] [place] [address]



                   

 

       Normal     Nitrate,  negative  negative  Final  [facility]



           Urine        [facility]



                   (Lab): 104



                   [address] [place] [address]



                   

 

       Normal     Urine  negative  negative  Final  [facility]



           Leukocyte        [facility]



           Esterase        [facility]



                   (Lab): 104



                   [address] [place] [address]



                   

 

       Normal     RBC, Urine  =1-5  0-5  Final  [facility]



             /[hpf]  /[hpf]    [facility]



                   (Lab): 104



                   [address] [place] [address]



                   

 

       Normal     WBC, Urine  <1 /[hpf]  0-5  Final  [facility]



               /[hpf]    [facility]



                   (Lab): 104



                   [address] [place] [address]



                   

 

       Normal     Epithelial  =1-5  0-5  Final  [facility]



           Cell  /[hpf]  /[hpf]    [facility]



                   Center



                   (Lab): 104



                   [address] [place] [address]



                   

 

       Normal     Bacteria,  none  none  Final  [facility]



           Urine  detected  detect    [facility]



             /[hpf]  /[hpf]    [facility]



                   Center



                   (Lab): 104



                   [address] [place] [address]



                   

 

       Normal     Casts,urine  =2-5 /lpf  none  Final  [facility]



               detect    [facility]



               /lpf    [facility]



                   Center



                   (Lab): 104



                   [address] [place] [address]



                   

 

       Normal     Urine Culture  no    Final  [facility]



           Added?        [facility]



                   [facility]



                   [facility]



                   (Lab): 104



                   [address] [place] [address]



                   

 

 2019  BMP, Serum    Normal     Glucose  95 mg/dL    Final  
[facility]



   or Plasma            mg/dL    [facility]



                   (Lab): 104



                   [address] [place] [address]



                   

 

       Normal     Blood Urea  7 mg/dL  6-20  Final  [facility]



           Nitrogen    mg/dL    [facility]



                   (Lab): 104



                   [address] [place] [address]



                   

 

       Low     Osmolality  279  280-300  Final  [facility]



           Calculated,        [facility]



                   Center



                   (Lab): 104



                   [address] [place] [address]



                   

 

       Low     Creatinine  0.4 mg/dL  0.50-0.90  Final  [facility]



               mg/dL    [facility]



                   Center



                   (Lab): 104



                   [address] [place] [address]



                   

 

       Normal     Glomerular  >60.00    Final  [facility]



           Filtration        [facility]



                   (Lab): 104



                   [address] [place] [address]



                   

 

       Normal     BUN/creatinin  17.5  12-20  Final  [facility]



           e Ratio        [facility]



                   (Lab): 104



                   [address] [place] [address]



                   

 

       Normal     Sodium Level  141  135-145  Final  [facility]



             mmol/L  mmol/L    [facility]



                   (Lab): 104



                   [address] [place] [address]



                   

 

       Normal     Potassium  4.4  3.5-5.2  Final  [facility]



           Level  mmol/L  mmol/L    [facility]



                   Center



                   (Lab): 104



                   [address] [place] [address]



                   

 

       Normal     Chloride  107    Final  [facility]



           Level  mmol/L  mmol/L    [facility]



                   (Lab): 104



                   [address] [place] [address]



                   

 

       Normal     Co2  22 mmol/L  21-32  Final  [facility]



               mmol/L    [facility]



                   (Lab): 104



                   [address] [place] [address]



                   

 

       Normal     Anion Gap  16.4  12-20  Final  [facility]



             mEq/L  mEq/L    [facility]



                   (Lab): 104



                   [address] [place] [address]



                   

 

       Low     Calcium Level  8.3 mg/dL  8.6-10.0  Final  [facility]



               mg/dL    [facility]



                   (Lab): 104



                   [address] [place] [address]



                   







Allergies







 Code  Code System  Name  Reaction  Severity  Status  Onset



             

 

 723  RxNorm  Amoxicillin      Active  







Problems







 Name  Status  Onset Date  Source  



         









 Dysuria  Active  2019  







Procedures







 Date  Name  Performed by  



       









   Procedure on Uterus  Information not available







Vaccine List

None recorded.



Social History







 Smoking Status  Never Smoker  







Past Encounters







 2019



 Dysuria



 Jerome De La Garza MD: [address] [place] [address], [address], [place] [address], Ph. (
[phone]







History of Present Illness

Note:    CC dysuria&lt;div&gt;hpi f/up to ED where she had cath UA and bld 
alcohol high, since then has had dysuria.  She was in in Feb and got 
antibiotics for UTI but did not take them so she had them leftover and started 
them yesterday.&lt;/div&gt;&lt;div&gt;ros&lt;/div&gt;&lt;div&gt;gen healthy&lt;/
div&gt;&lt;div&gt;cv neg&lt;/div&gt;&lt;div&gt;resp neg&lt;/div&gt;&lt;div&gt;
gu only has one kidney&lt;/div&gt;Review of Systems:    ROS as noted in the HPI



Review of Systems

None recorded.



Physical Exam







   

 

 Notes:  consult none

## 2020-02-28 NOTE — H&P ADULT - NSHPREVIEWOFSYSTEMS_GEN_ALL_CORE
REVIEW OF SYSTEMS:  CONSTITUTIONAL: No fever, weight loss, or fatigue  EYES: No eye pain, visual disturbances, or discharge  ENMT:  No difficulty hearing, tinnitus, vertigo; No sinus or throat pain  NECK: No pain or stiffness  BREASTS: No pain, masses, or nipple discharge  RESPIRATORY: No cough, wheezing, chills or hemoptysis; No shortness of breath  CARDIOVASCULAR: No chest pain, palpitations, dizziness, or leg swelling  GASTROINTESTINAL: No abdominal or epigastric pain. No nausea, vomiting, or hematemesis; No diarrhea or constipation. No melena or hematochezia.  GENITOURINARY: No dysuria, frequency, hematuria, or incontinence  NEUROLOGICAL: No headaches, memory loss, loss of strength, numbness, or tremors  SKIN: No itching, burning, rashes, or lesions   LYMPH NODES: No enlarged glands  ENDOCRINE: No heat or cold intolerance; No hair loss; No polydipsia or polyuria  MUSCULOSKELETAL: joint swelling, pain   PSYCHIATRIC: No depression, anxiety, mood swings, or difficulty sleeping  HEME/LYMPH: No easy bruising, or bleeding gums  ALLERGY AND IMMUNOLOGIC: No hives or eczema REVIEW OF SYSTEMS:  CONSTITUTIONAL: No fever, weight loss, or fatigue  EYES: No eye pain, visual disturbances, or discharge  ENMT:  No difficulty hearing, tinnitus, vertigo; No sinus or throat pain  NECK: No pain or stiffness  BREASTS: No pain, masses, or nipple discharge  RESPIRATORY: No cough, wheezing, chills or hemoptysis; No shortness of breath  CARDIOVASCULAR: No chest pain, palpitations, dizziness, or leg swelling  GASTROINTESTINAL: No abdominal or epigastric pain. No nausea, vomiting, or hematemesis; No diarrhea or constipation. No melena or hematochezia.  GENITOURINARY: No dysuria, frequency, hematuria, or incontinence  NEUROLOGICAL: No headaches, memory loss, loss of strength, numbness, or tremors  SKIN: No itching, burning, rashes, or lesions   LYMPH NODES: No enlarged glands  ENDOCRINE: No heat or cold intolerance; No hair loss; No polydipsia or polyuria  MUSCULOSKELETAL: + incisional drainage, redness, joint swelling, pain   PSYCHIATRIC: No depression, anxiety, mood swings, or difficulty sleeping  HEME/LYMPH: No easy bruising, or bleeding gums  ALLERGY AND IMMUNOLOGIC: No hives or eczema

## 2020-02-28 NOTE — CHART NOTE - NSCHARTNOTEFT_GEN_A_CORE
Post-op exam:  AAO X3, pain is well controlled, lying down flat supine comfortably without orthopnea, obese, no JVD, (++) right ankle soft pitting edema, (+) B/L DPA & PTA pulsations, Heart: normal S1, ACC S2, grade II/VI KWASI max over cardiac base propagating to apex, no extra sounds, Lungs: CTA B/L, Abdomen: soft, obese, NT, ND, BS (+), no palpable masses or organomegaly.  Vitals: HR 83/min, BP 93/58 RR 18/min, SPO@ 96% RA, afebrile.  02-28    141  |  102  |  17  ----------------------------<  169<H>  3.7   |  32<H>  |  1.56<H>    Ca    8.3<L>      28 Feb 2020 20:45                            12.5   13.17 )-----------( 138      ( 28 Feb 2020 20:45 )             38.4         PT/INR - ( 28 Feb 2020 12:17 )   PT: 12.4 sec;   INR: 1.11 ratio    PTT - ( 28 Feb 2020 12:17 )  PTT:31.5 sec      A/P: JEANETTE, hypotension possibly related to pain meds & por PO intake over the whole day, 2000 mL LR bolus over 1 hour now, already on maintenance of 125/min, monitor I & O and trend BUN/Cr.         Thrombocytopenia, platelet count dropped from 279K to 138K, possibly related to infection, no heparin exposure, on low dose Asprin, will continue for now & monitor hemo vac/bleeding while trending platelet count.         Currently on Vancomycin & Zosyn, f/u OR culture results, ID Dr. Aguilar on board. Post-op exam:  AAO X3, pain is well controlled, lying down flat supine comfortably without orthopnea, obese, no JVD, (++) right ankle soft pitting edema, (+) B/L DPA & PTA pulsations, Heart: normal S1, ACC S2, grade II/VI KWASI max over cardiac base propagating to apex, no extra sounds, Lungs: CTA B/L, Abdomen: soft, obese, NT, ND, BS (+), no palpable masses or organomegaly.  Vitals: HR 83/min, BP 93/58 RR 18/min, SPO@ 96% RA, afebrile.  02-28    141  |  102  |  17  ----------------------------<  169<H>  3.7   |  32<H>  |  1.56<H>    Ca    8.3<L>      28 Feb 2020 20:45                            12.5   13.17 )-----------( 138      ( 28 Feb 2020 20:45 )             38.4         PT/INR - ( 28 Feb 2020 12:17 )   PT: 12.4 sec;   INR: 1.11 ratio    PTT - ( 28 Feb 2020 12:17 )  PTT:31.5 sec      A/P: JEANETTE, hypotension possibly related to pain meds & por PO intake over the whole day, 2000          mL LR bolus over 1 hour now, already on maintenance of 125/min, monitor I & O and trend BUN/Cr.         Thrombocytopenia, platelet count dropped from 279K to 138K, possibly related to infection, no heparin exposure, on low dose Asprin, will continue for now & monitor hemo vac/bleeding while trending platelet count.         Currently on Vancomycin & Zosyn, f/u OR culture results, ID Dr. Aguilar on board. Post-op exam:  AAO X3, pain is well controlled, lying down flat supine comfortably without orthopnea, obese, no JVD, (++) right ankle soft pitting edema, (+) B/L DPA & PTA pulsations, Heart: normal S1, ACC S2, grade II/VI KWASI max over cardiac base propagating to apex, no extra sounds, Lungs: CTA B/L, Abdomen: soft, obese, NT, ND, BS (+), no palpable masses or organomegaly.  Vitals: HR 83/min, BP 93/58 RR 18/min, SPO@ 96% RA, afebrile.  02-28    141  |  102  |  17  ----------------------------<  169<H>  3.7   |  32<H>  |  1.56<H>    Ca    8.3<L>      28 Feb 2020 20:45                            12.5   13.17 )-----------( 138      ( 28 Feb 2020 20:45 )             38.4         PT/INR - ( 28 Feb 2020 12:17 )   PT: 12.4 sec;   INR: 1.11 ratio    PTT - ( 28 Feb 2020 12:17 )  PTT:31.5 sec      A/P: JEANETTE, hypotension possibly related to pain meds & por PO intake over the whole day, 2000          mL LR bolus over 1 hour now, already on maintenance of 125/min, monitor I & O and trend          BUN/Cr.         Thrombocytopenia, platelet count dropped from 279K to 138K, possibly related to infection,         no heparin exposure, on low dose Asprin, will continue for now & monitor          hemo vac/bleeding while trending platelet count.         Currently on Vancomycin & Zosyn, f/u OR culture results, ID Dr. Aguilar on board.

## 2020-02-28 NOTE — PROVIDER CONTACT NOTE (CRITICAL VALUE NOTIFICATION) - SITUATION
Awaiting OR today for incision and drainage right knee after post-op right total knee replacement infection

## 2020-02-28 NOTE — PROVIDER CONTACT NOTE (CRITICAL VALUE NOTIFICATION) - BACKGROUND
Post-op right total knee replacement 1/15/2020. Infection to knee for 2 weeks on second antibiotic, Cipro.

## 2020-02-28 NOTE — CONSULT NOTE ADULT - ASSESSMENT
The patient is a 65 year old male with a history of HTN, HL, right TKR who presents with drainage from the knee, for surgery.    Plan:  - ECG with no evidence of ischemia or infarction  - Recent echo with normal LV systolic function, mild/mod AR  - Recent cath with minimal disease  - Tachycardia - known issue at baseline. Can consider adding a beta-blocker post-operatively for tachycardia and HTN.  - Continue amlodipine 10 mg daily  - Continue lisinopril 20 mg daily  - There are no active cardiac issues. The patient is at low/intermediate risk for cardiac events for an intermediate risk surgery. He is optimized to proceed without additional cardiac testing.

## 2020-02-28 NOTE — ED PROVIDER NOTE - ATTENDING CONTRIBUTION TO CARE
Pt here for R knee swelling, persistent since postoperative state 6wks ago, here for operative asssessment and drainage.  Pt denies any fever, vomiting, cp, sob.  +recent travel, car ride.  Had one episode of spontaneous drainage from incision site last week with red-tinged fluid expressed.  Exam: +DP pulse, +swelling LE and anterior knee with slight purplish hue to skin, slight warmth.  No obvious cellulitis or dihiscience.

## 2020-02-28 NOTE — ED PROVIDER NOTE - OBJECTIVE STATEMENT
65 year old male 65 year old male with history of HTN and HLD presents to be admitted to have right knee washed out in the OR. Had right knee replacement on Jan 15th by Dr. Best. Was started on Abx 1.5-2 weeks ago (unsure name, still taking). Drainage from small incision hole started last week. no fevers, chills, or body aches. was seen by ortho in office yesterday and told to come to ED today to have knee washed out. no current pain. last meal last evening 8:00 pm (hot dog and beans). took meds this am around 9-9:30.   PCP Vernon Best

## 2020-02-28 NOTE — ED PROVIDER NOTE - MUSCULOSKELETAL, MLM
no knee tenderness to palpation. no calf tenderness or swelling no knee tenderness to palpation. no calf tenderness. mild calf swelling

## 2020-02-28 NOTE — BRIEF OPERATIVE NOTE - OPERATION/FINDINGS
Sinus tract upper incision tracking into hole in medial retinaculum; fibrinous exudate, synovitis, yellow clear joint fluid  Bearing exchange  Xray post procedure with No FB, stable implant placement

## 2020-02-29 LAB
ANION GAP SERPL CALC-SCNC: 10 MMOL/L — SIGNIFICANT CHANGE UP (ref 5–17)
BUN SERPL-MCNC: 17 MG/DL — SIGNIFICANT CHANGE UP (ref 7–23)
CALCIUM SERPL-MCNC: 9.1 MG/DL — SIGNIFICANT CHANGE UP (ref 8.4–10.5)
CHLORIDE SERPL-SCNC: 101 MMOL/L — SIGNIFICANT CHANGE UP (ref 96–108)
CO2 SERPL-SCNC: 27 MMOL/L — SIGNIFICANT CHANGE UP (ref 22–31)
CREAT SERPL-MCNC: 1.32 MG/DL — HIGH (ref 0.5–1.3)
GLUCOSE SERPL-MCNC: 181 MG/DL — HIGH (ref 70–99)
GRAM STN FLD: SIGNIFICANT CHANGE UP
HCT VFR BLD CALC: 34.8 % — LOW (ref 39–50)
HCV AB S/CO SERPL IA: 0.15 S/CO — SIGNIFICANT CHANGE UP (ref 0–0.99)
HCV AB SERPL-IMP: SIGNIFICANT CHANGE UP
HGB BLD-MCNC: 11.6 G/DL — LOW (ref 13–17)
MCHC RBC-ENTMCNC: 30 PG — SIGNIFICANT CHANGE UP (ref 27–34)
MCHC RBC-ENTMCNC: 33.3 GM/DL — SIGNIFICANT CHANGE UP (ref 32–36)
MCV RBC AUTO: 89.9 FL — SIGNIFICANT CHANGE UP (ref 80–100)
NRBC # BLD: 0 /100 WBCS — SIGNIFICANT CHANGE UP (ref 0–0)
PLATELET # BLD AUTO: 288 K/UL — SIGNIFICANT CHANGE UP (ref 150–400)
POTASSIUM SERPL-MCNC: 4 MMOL/L — SIGNIFICANT CHANGE UP (ref 3.5–5.3)
POTASSIUM SERPL-SCNC: 4 MMOL/L — SIGNIFICANT CHANGE UP (ref 3.5–5.3)
RBC # BLD: 3.87 M/UL — LOW (ref 4.2–5.8)
RBC # FLD: 13.3 % — SIGNIFICANT CHANGE UP (ref 10.3–14.5)
SODIUM SERPL-SCNC: 138 MMOL/L — SIGNIFICANT CHANGE UP (ref 135–145)
SPECIMEN SOURCE: SIGNIFICANT CHANGE UP
WBC # BLD: 9.78 K/UL — SIGNIFICANT CHANGE UP (ref 3.8–10.5)
WBC # FLD AUTO: 9.78 K/UL — SIGNIFICANT CHANGE UP (ref 3.8–10.5)

## 2020-02-29 PROCEDURE — 99233 SBSQ HOSP IP/OBS HIGH 50: CPT

## 2020-02-29 RX ORDER — METOPROLOL TARTRATE 50 MG
12.5 TABLET ORAL
Refills: 0 | Status: DISCONTINUED | OUTPATIENT
Start: 2020-02-29 | End: 2020-03-02

## 2020-02-29 RX ORDER — SODIUM CHLORIDE 9 MG/ML
2000 INJECTION, SOLUTION INTRAVENOUS ONCE
Refills: 0 | Status: COMPLETED | OUTPATIENT
Start: 2020-02-29 | End: 2020-02-29

## 2020-02-29 RX ADMIN — OXYCODONE HYDROCHLORIDE 10 MILLIGRAM(S): 5 TABLET ORAL at 19:02

## 2020-02-29 RX ADMIN — PIPERACILLIN AND TAZOBACTAM 25 GRAM(S): 4; .5 INJECTION, POWDER, LYOPHILIZED, FOR SOLUTION INTRAVENOUS at 13:09

## 2020-02-29 RX ADMIN — PANTOPRAZOLE SODIUM 40 MILLIGRAM(S): 20 TABLET, DELAYED RELEASE ORAL at 06:01

## 2020-02-29 RX ADMIN — Medication 81 MILLIGRAM(S): at 09:18

## 2020-02-29 RX ADMIN — Medication 1000 MILLIGRAM(S): at 06:30

## 2020-02-29 RX ADMIN — Medication 300 MILLIGRAM(S): at 05:04

## 2020-02-29 RX ADMIN — Medication 400 MILLIGRAM(S): at 06:00

## 2020-02-29 RX ADMIN — OXYCODONE HYDROCHLORIDE 10 MILLIGRAM(S): 5 TABLET ORAL at 18:32

## 2020-02-29 RX ADMIN — OXYCODONE HYDROCHLORIDE 10 MILLIGRAM(S): 5 TABLET ORAL at 03:02

## 2020-02-29 RX ADMIN — Medication 400 MILLIGRAM(S): at 11:23

## 2020-02-29 RX ADMIN — Medication 1000 MILLIGRAM(S): at 11:53

## 2020-02-29 RX ADMIN — ATORVASTATIN CALCIUM 40 MILLIGRAM(S): 80 TABLET, FILM COATED ORAL at 21:43

## 2020-02-29 RX ADMIN — PIPERACILLIN AND TAZOBACTAM 25 GRAM(S): 4; .5 INJECTION, POWDER, LYOPHILIZED, FOR SOLUTION INTRAVENOUS at 06:00

## 2020-02-29 RX ADMIN — SODIUM CHLORIDE 2000 MILLILITER(S): 9 INJECTION, SOLUTION INTRAVENOUS at 00:54

## 2020-02-29 RX ADMIN — Medication 81 MILLIGRAM(S): at 21:43

## 2020-02-29 RX ADMIN — OXYCODONE HYDROCHLORIDE 10 MILLIGRAM(S): 5 TABLET ORAL at 03:45

## 2020-02-29 RX ADMIN — PIPERACILLIN AND TAZOBACTAM 25 GRAM(S): 4; .5 INJECTION, POWDER, LYOPHILIZED, FOR SOLUTION INTRAVENOUS at 21:42

## 2020-02-29 RX ADMIN — Medication 1000 MILLIGRAM(S): at 00:50

## 2020-02-29 RX ADMIN — Medication 400 MILLIGRAM(S): at 00:15

## 2020-02-29 RX ADMIN — Medication 12.5 MILLIGRAM(S): at 14:32

## 2020-02-29 NOTE — OCCUPATIONAL THERAPY INITIAL EVALUATION ADULT - ADDITIONAL COMMENTS
Pt lives with his dtr in a private home, bedroom on 2nd floor. Bathroom has a tub with shower chair. Pt reported that since his recent Right TKR, he has been independent with ADLs and functional mobility without AD. Pt owns RW, cane, commode, shower chair.

## 2020-02-29 NOTE — PROGRESS NOTE ADULT - SUBJECTIVE AND OBJECTIVE BOX
Patient is a 65y old  Male who presents with a chief complaint of incisional drainage right knee (28 Feb 2020 17:42)        HPI:  64yo M with pmh of htn, hld gerd,  admitted s/p RIGHT TKR Wyatt 15, presented to be admitted to have right knee washed out in the OR. Had right knee replacement on Jan 15th by Dr. Fernández. Noticed drainage from surgical incision about 10 days ago. was seen by Dr. Fernández in the office, knee fluid aspirated and sent for cultures on 2/24/20 grew out few pseudomonas. Was started on Abx 1.5-2 weeks ago (cipro?). . No fevers, chills, or body aches. Was seen in office yesterday and told to come to ED today to have knee washed out. no current pain. last meal last evening 8:00 pm (hot dog and beans). took meds this am around 9-9:30. (28 Feb 2020 12:49)      SUBJECTIVE & OBJECTIVE: Pt seen and examined at bedside. s/p septic knee washout     PHYSICAL EXAM:  T(C): 36.6 (02-29-20 @ 09:16), Max: 36.9 (02-28-20 @ 19:59)  HR: 114 (02-29-20 @ 09:16) (76 - 114)  BP: 114/66 (02-29-20 @ 09:16) (91/53 - 141/56)  RR: 19 (02-29-20 @ 09:16) (9 - 20)  SpO2: 96% (02-29-20 @ 09:16) (94% - 99%)  Wt(kg): -- Height (cm): 175.26 (02-28 @ 14:44)  Weight (kg): 103.4 (02-28 @ 14:44)  BMI (kg/m2): 33.7 (02-28 @ 14:44)  BSA (m2): 2.18 (02-28 @ 14:44)  GENERAL: NAD, well-groomed, well-developed  HEAD:  Atraumatic, Normocephalic  EYES: EOMI, PERRLA, conjunctiva and sclera clear  ENMT: Moist mucous membranes  NECK: Supple, No JVD  NERVOUS SYSTEM:  Alert & Oriented X3  CHEST/LUNG: Clear to auscultation bilaterally; No rales, rhonchi, wheezing, or rubs  HEART: Regular rate and rhythm; No murmurs, rubs, or gallops  ABDOMEN: Soft, Nontender, Nondistended; Bowel sounds present  EXTREMITIES:  2+ Peripheral Pulses, No clubbing, cyanosis, or edema        MEDICATIONS  (STANDING):  acetaminophen  IVPB .. 1000 milliGRAM(s) IV Intermittent every 6 hours  aspirin enteric coated 81 milliGRAM(s) Oral every 12 hours  atorvastatin 40 milliGRAM(s) Oral at bedtime  influenza   Vaccine 0.5 milliLiter(s) IntraMuscular once  lactated ringers. 1000 milliLiter(s) (125 mL/Hr) IV Continuous <Continuous>  pantoprazole    Tablet 40 milliGRAM(s) Oral before breakfast  piperacillin/tazobactam IVPB.. 3.375 Gram(s) IV Intermittent every 8 hours  polyethylene glycol 3350 17 Gram(s) Oral daily  senna 2 Tablet(s) Oral at bedtime  vancomycin  IVPB 1500 milliGRAM(s) IV Intermittent every 12 hours    MEDICATIONS  (PRN):  aluminum hydroxide/magnesium hydroxide/simethicone Suspension 30 milliLiter(s) Oral four times a day PRN Indigestion  HYDROmorphone  Injectable 0.5 milliGRAM(s) IV Push every 3 hours PRN Severe Pain (7 - 10)  magnesium hydroxide Suspension 30 milliLiter(s) Oral daily PRN Constipation  ondansetron Injectable 4 milliGRAM(s) IV Push every 6 hours PRN Nausea and/or Vomiting  oxyCODONE    IR 5 milliGRAM(s) Oral every 3 hours PRN Mild Pain (1 - 3)  oxyCODONE    IR 10 milliGRAM(s) Oral every 3 hours PRN Moderate Pain (4 - 6)      LABS:                        11.6   9.78  )-----------( 288      ( 29 Feb 2020 06:54 )             34.8     02-29    138  |  101  |  17  ----------------------------<  181<H>  4.0   |  27  |  1.32<H>    Ca    9.1      29 Feb 2020 06:54      PT/INR - ( 28 Feb 2020 12:17 )   PT: 12.4 sec;   INR: 1.11 ratio         PTT - ( 28 Feb 2020 12:17 )  PTT:31.5 sec      CAPILLARY BLOOD GLUCOSE          CAPILLARY BLOOD GLUCOSE        CAPILLARY BLOOD GLUCOSE                RECENT CULTURES:      RADIOLOGY & ADDITIONAL TESTS:                        DVT/GI ppx  Discussed with pt @ bedside

## 2020-02-29 NOTE — OCCUPATIONAL THERAPY INITIAL EVALUATION ADULT - PERTINENT HX OF CURRENT PROBLEM, REHAB EVAL
Pt is a 64 y/o male s/p RIGHT TKR [facility]att 15, presented 2/28/20 to be admitted to have right knee washed out in the OR.

## 2020-02-29 NOTE — PROGRESS NOTE ADULT - SUBJECTIVE AND OBJECTIVE BOX
ANDREY PALAFOX is a 65yMale , patient examined and chart reviewed.     INTERVAL HPI/ OVERNIGHT EVENTS:   Awake alert. NO distress.  Afebrile.    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 , Being monitored by ophthalmologist , eye injections every 10 weeks and Laser Rx  Bladder polyp: 2010 being monitored by urologist  Osteoarthritis of right knee  High cholesterol  HTN (hypertension)  S/P reconstruction procedure: left arm reconstruction surgery s/p laceration of left forearm with powersaw  2010  History of cholecystectomy: 2016  H/O hernia repair: bilateral inguinal hernia 2018      For details regarding the patient's social history, family history, and other miscellaneous elements, please refer the initial infectious diseases consultation and/or the admitting history and physical examination for this admission    ROS:  CONSTITUTIONAL:  Negative fever or chills, feels well, good appetite  EYES:  Negative  blurry vision or double vision  CARDIOVASCULAR:  Negative for chest pain or palpitations  RESPIRATORY:  Negative for cough, wheezing, or SOB   GASTROINTESTINAL:  Negative for nausea, vomiting, diarrhea, constipation, or abdominal pain  GENITOURINARY:  Negative frequency, urgency or dysuria  NEUROLOGIC:  No headache, confusion, dizziness, lightheadedness  All other systems were reviewed and are negative         Current inpatient medications :    ANTIBIOTICS/RELEVANT:  piperacillin/tazobactam IVPB.. 3.375 Gram(s) IV Intermittent every 8 hours  vancomycin  IVPB 1500 milliGRAM(s) IV Intermittent every 12 hours      aluminum hydroxide/magnesium hydroxide/simethicone Suspension 30 milliLiter(s) Oral four times a day PRN  aspirin enteric coated 81 milliGRAM(s) Oral every 12 hours  atorvastatin 40 milliGRAM(s) Oral at bedtime  HYDROmorphone  Injectable 0.5 milliGRAM(s) IV Push every 3 hours PRN  magnesium hydroxide Suspension 30 milliLiter(s) Oral daily PRN  metoprolol tartrate 12.5 milliGRAM(s) Oral two times a day  ondansetron Injectable 4 milliGRAM(s) IV Push every 6 hours PRN  oxyCODONE    IR 5 milliGRAM(s) Oral every 3 hours PRN  oxyCODONE    IR 10 milliGRAM(s) Oral every 3 hours PRN  pantoprazole    Tablet 40 milliGRAM(s) Oral before breakfast  polyethylene glycol 3350 17 Gram(s) Oral daily  senna 2 Tablet(s) Oral at bedtime      Objective:    02-28 @ 07:01  - 02-29 @ 07:00  --------------------------------------------------------  IN: 5475 mL / OUT: 1425 mL / NET: 4050 mL    02-29 @ 07:01  -  02-29 @ 22:56  --------------------------------------------------------  IN: 1100 mL / OUT: 460 mL / NET: 640 mL      T(C): 36.7 (02-29-20 @ 21:13), Max: 36.8 (02-29-20 @ 14:04)  HR: 120 (02-29-20 @ 21:13) (94 - 125)  BP: 106/58 (02-29-20 @ 21:13) (96/61 - 130/77)  RR: 18 (02-29-20 @ 21:13) (18 - 19)  SpO2: 95% (02-29-20 @ 21:13) (95% - 98%)  Wt(kg): --      Physical Exam:  General: well developed well nourished, in no acute distress  Eyes: sclera anicteric, pupils equal and reactive to light  ENMT: buccal mucosa moist, pharynx not injected  Neck: supple, trachea midline  Lungs: clear, no wheeze/rhonchi  Cardiovascular: regular rate and rhythm, S1 S2  Abdomen: soft, nontender, no organomegaly present, bowel sounds normal  Neurological: alert and oriented x3, Cranial Nerves II-XII grossly intact  Skin: no increased ecchymosis/petechiae/purpura  Lymph Nodes: no palpable cervical/supraclavicular lymph nodes enlargements  Extremities: Right knee drsg c/d/i + drain      LABS:                          11.6   9.78  )-----------( 288      ( 29 Feb 2020 06:54 )             34.8       02-29    138  |  101  |  17  ----------------------------<  181<H>  4.0   |  27  |  1.32<H>    Ca    9.1      29 Feb 2020 06:54        PT/INR - ( 28 Feb 2020 12:17 )   PT: 12.4 sec;   INR: 1.11 ratio         PTT - ( 28 Feb 2020 12:17 )  PTT:31.5 sec      MICROBIOLOGY:    Culture - Surgical Swab (collected 28 Feb 2020 21:19)  Source: .Surgical Swab C&amp;S RIGHT KNEE SURGICAL SWAB 2  Preliminary Report (29 Feb 2020 16:25):    No growth    Culture - Tissue with Gram Stain (collected 28 Feb 2020 20:59)  Source: .Tissue Other  Gram Stain (29 Feb 2020 02:02):    Rare polymorphonuclear leukocytes seen per low power field    No organisms seen per oil power field  Preliminary Report (29 Feb 2020 21:16):    No growth    RADIOLOGY & ADDITIONAL STUDIES:    EXAM:  KNEE AP  LAT & OBL RIGHT                                  PROCEDURE DATE:  02/28/2020          INTERPRETATION:  XR KNEE AP AND LATERAL AND OBLIQUE RIGHT    CLINICAL INFORMATION:  intaop    AP, lateral intraoperative views of the right knee are submitted. Status post right knee arthroplasty with expected postsurgical changes    Assessment :  66yo M with pmh of htn, hld gerd, s/p RIGHT TKR Wyatt 15 2020 by Dr. 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 Per ortho - Sinus tract upper incision tracking into hole in medial retinaculum; fibrinous exudate, synovitis, yellow clear joint fluid +purulence and evidence or infection. Sp I&D washout and bearing exchange pod 1.Outpt surface culture with Pseudomonas.    Plan :   Cont Vanc Zosyn  Check Vanc trough  Fu OR cultures  Will likely need long term IV antibiotic therapy    D/w DR Fernández       Continue with present regiment.  Appropriate use of antibiotics and adverse effects reviewed.      I have discussed the above plan of care with patient/ family in detail. They expressed understanding of the  treatment plan . Risks, benefits and alternatives discussed in detail. I have asked if they have any questions or concerns and appropriately addressed them to the best of my ability .    > 35 minutes were spent in direct patient care reviewing notes, medications ,labs data/ imaging , discussion with multidisciplinary team.    Thank you for allowing me to participate in care of your patient .    Candis Aguilar MD  Infectious Disease  [phone]

## 2020-02-29 NOTE — PROGRESS NOTE ADULT - SUBJECTIVE AND OBJECTIVE BOX
Patient is a 65y Male with a known history of :  HTN (hypertension) (I10)  High cholesterol (E78.00)  History of hypertensive retinopathy (Z86.69)  Knee effusion, right (M25.461)    HPI:  64yo M with pmh of htn, hld gerd,  admitted s/p RIGHT TKR Wyatt 15, presented to be admitted to have right knee washed out in the OR. Had right knee replacement on  by Dr. Fernández. Noticed drainage from surgical incision about 10 days ago. was seen by Dr. Fernández in the office, knee fluid aspirated and sent for cultures on 20 grew out few pseudomonas. Was started on Abx 1.5-2 weeks ago (cipro?). . No fevers, chills, or body aches. Was seen in office yesterday and told to come to ED today to have knee washed out. no current pain. last meal last evening 8:00 pm (hot dog and beans). took meds this am around 9-9:30. (2020 12:49)      REVIEW OF SYSTEMS:    CONSTITUTIONAL: No fever, weight loss, or fatigue  EYES: No eye pain, visual disturbances, or discharge  ENMT:  No difficulty hearing, tinnitus, vertigo; No sinus or throat pain  NECK: No pain or stiffness  BREASTS: No pain, masses, or nipple discharge  RESPIRATORY: No cough, wheezing, chills or hemoptysis; No shortness of breath  CARDIOVASCULAR: No chest pain, palpitations, dizziness, or leg swelling  GASTROINTESTINAL: No abdominal or epigastric pain. No nausea, vomiting, or hematemesis; No diarrhea or constipation. No melena or hematochezia.  GENITOURINARY: No dysuria, frequency, hematuria, or incontinence  NEUROLOGICAL: No headaches, memory loss, loss of strength, numbness, or tremors  SKIN: No itching, burning, rashes, or lesions   LYMPH NODES: No enlarged glands  ENDOCRINE: No heat or cold intolerance; No hair loss  MUSCULOSKELETAL: No joint pain or swelling; No muscle, back, or extremity pain  PSYCHIATRIC: No depression, anxiety, mood swings, or difficulty sleeping  HEME/LYMPH: No easy bruising, or bleeding gums  ALLERGY AND IMMUNOLOGIC: No hives or eczema    MEDICATIONS  (STANDING):  aspirin enteric coated 81 milliGRAM(s) Oral every 12 hours  atorvastatin 40 milliGRAM(s) Oral at bedtime  influenza   Vaccine 0.5 milliLiter(s) IntraMuscular once  pantoprazole    Tablet 40 milliGRAM(s) Oral before breakfast  piperacillin/tazobactam IVPB.. 3.375 Gram(s) IV Intermittent every 8 hours  polyethylene glycol 3350 17 Gram(s) Oral daily  senna 2 Tablet(s) Oral at bedtime  vancomycin  IVPB 1500 milliGRAM(s) IV Intermittent every 12 hours    MEDICATIONS  (PRN):  aluminum hydroxide/magnesium hydroxide/simethicone Suspension 30 milliLiter(s) Oral four times a day PRN Indigestion  HYDROmorphone  Injectable 0.5 milliGRAM(s) IV Push every 3 hours PRN Severe Pain (7 - 10)  magnesium hydroxide Suspension 30 milliLiter(s) Oral daily PRN Constipation  ondansetron Injectable 4 milliGRAM(s) IV Push every 6 hours PRN Nausea and/or Vomiting  oxyCODONE    IR 5 milliGRAM(s) Oral every 3 hours PRN Mild Pain (1 - 3)  oxyCODONE    IR 10 milliGRAM(s) Oral every 3 hours PRN Moderate Pain (4 - 6)      ALLERGIES: No Known Allergies      FAMILY HISTORY:  Family history of sarcoidosis: father  Family history of lung disease: mesothelioma      Social history:  Alochol:   Smoking:   Drug Use:   Marital Status:     PHYSICAL EXAMINATION:  -----------------------------  T(C): 36.6 (20 @ 09:16), Max: 36.9 (20 @ 19:59)  HR: 114 (20 @ 09:16) (76 - 114)  BP: 114/66 (20 @ 09:16) (91/53 - 141/56)  RR: 19 (20 @ 09:16) (9 - 20)  SpO2: 96% (20 @ 09:16) (94% - 99%)  Wt(kg): --     @ 07:01  -   @ 07:00  --------------------------------------------------------  IN:    IV PiggyBack: 2800 mL    lactated ringers.: 1425 mL    lactated ringers.: 1250 mL  Total IN: 5475 mL    OUT:    Accordian: 50 mL    Estimated Blood Loss: 25 mL    Voided: 1350 mL  Total OUT: 1425 mL    Total NET: 4050 mL       @ 07:01  -   @ 13:18  --------------------------------------------------------  IN:    lactated ringers.: 500 mL  Total IN: 500 mL    OUT:    Voided: 400 mL  Total OUT: 400 mL    Total NET: 100 mL        Height (cm): 175.26 ( @ 14:44)  Weight (kg): 103.4 ( @ :44)  BMI (kg/m2): 33.7 ( @ :44)  BSA (m2): 2.18 ( @ :44)    Constitutional: well developed, normal appearance, well groomed, well nourished, no deformities and no acute distress.   Eyes: the conjunctiva exhibited no abnormalities and the eyelids demonstrated no xanthelasmas.   HEENT: normal oral mucosa, no oral pallor and no oral cyanosis.   Neck: normal jugular venous A waves present, normal jugular venous V waves present and no jugular venous mijares A waves.   Pulmonary: no respiratory distress, normal respiratory rhythm and effort, no accessory muscle use and lungs were clear to auscultation bilaterally.   Cardiovascular: heart rate and rhythm were normal, normal S1 and S2 and no murmur, gallop, rub, heave or thrill are present.   Abdomen: soft, non-tender, no hepato-splenomegaly and no abdominal mass palpated.   Musculoskeletal: the gait could not be assessed..   Extremities: no clubbing of the fingernails, no localized cyanosis, no petechial hemorrhages and no ischemic changes.   Skin: normal skin color and pigmentation, no rash, no venous stasis, no skin lesions, no skin ulcer and no xanthoma was observed.   Psychiatric: oriented to person, place, and time, the affect was normal, the mood was normal and not feeling anxious.     LABS:   --------      138  |  101  |  17  ----------------------------<  181<H>  4.0   |  27  |  1.32<H>    Ca    9.1      2020 06:54                           11.6   9.78  )-----------( 288      ( 2020 06:54 )             34.8     PT/INR - ( 2020 12:17 )   PT: 12.4 sec;   INR: 1.11 ratio         PTT - ( 2020 12:17 )  PTT:31.5 sec           @ 20:59    Organism --   Gram Stain Blood -- Gram Stain   Rare polymorphonuclear leukocytes seen per low power field  No organisms seen per oil power field  Specimen Source .Tissue Other  Culture-Blood --        Radiology:    < from: Cardiac Cath Lab - Adult (19 @ 11:17) >    [facility]  Department of Cardiology  [address] [place] [address]  [phone]  Cath Lab Report -- Comprehensive Report  Patient: ANDREY PALAFOX  Study date: 2019  Account number: [id number]  MR number: [id number]  : 1955  Gender: Male  Race: W  Case Physician(s):  Tom Echeverria M.D.  Fellow:  VANDANA Turner M.D.  Referring Physician:  Saman Blas M.D.  INDICATIONS: Abnormal stress test.  HISTORY: There was no prior cardiachistory. The patient has hypertension.  PROCEDURE:  --  Left coronary angiography.  --  Right coronary angiography.  TECHNIQUE: The risks and alternatives of the procedures and conscious  sedation were explained to the patient and informed consent was obtained.  Cardiac catheterization performed electively.  Local anesthetic given. Right radial artery access. Left coronary artery  angiography. The vessel was injected utilizing a catheter. Right coronary  artery angiography. The vessel was injected utilizing a catheter.  RADIATION EXPOSURE: 6.3 min.  CONTRAST GIVEN: Omnipaque 46 ml.  MEDICATIONS GIVEN: Fentanyl, 25 mcg, IV. Midazolam, 1 mg, IV. Verapamil  (Isoptin, Calan, Covera), 2.5 mg, IA. Heparin, 3000 units, IA.  CORONARY VESSELS: The coronary circulation is left dominant.  LM:   --  LM: Normal.  LAD:   --  Proximal LAD: Angiography showed mild atherosclerosis with no  flow limiting lesions.  CX:   --  OM1: Angiography showed mild atherosclerosis with no flow  limiting lesions.  RCA: --  RCA: Normal.  COMPLICATIONS: There were no complications.  DIAGNOSTIC RECOMMENDATIONS: The patient should continue with the present  medications.  Prepared and signed by  Tom Echeverria M.D.  Signed 2019 07:54:01  HEMODYNAMIC TABLES  Outputs:  Baseline  Outputs:  -- CALCULATIONS[age]  Outputs:  -- CALCULATIONS: Body Surface Area: 2.22  Outputs:  -- CALCULATIONS: Height in cm: 175.00  Outputs:  -- CALCULATIONS: Sex: Male  Outputs:  -- CALCULATIONS: Weight in k.00    < end of copied text >

## 2020-02-29 NOTE — PHYSICAL THERAPY INITIAL EVALUATION ADULT - IMPAIRED TRANSFERS: SIT/STAND, REHAB EVAL
DISPLAY PLAN FREE TEXT DISPLAY PLAN FREE TEXT DISPLAY PLAN FREE TEXT DISPLAY PLAN FREE TEXT DISPLAY PLAN FREE TEXT DISPLAY PLAN FREE TEXT pain/decreased strength/impaired balance

## 2020-02-29 NOTE — OCCUPATIONAL THERAPY INITIAL EVALUATION ADULT - PHYSICAL ASSIST/NONPHYSICAL ASSIST, OT EVAL
Prior Authorization Approval    Authorization Effective Date: 2/16/2018  Authorization Expiration Date: 8/15/2018  Medication: Emla-Initiated-Approved-  Approved Dose/Quantity:   Reference #:     Insurance Company: Shital Gilbertrussell - Phone [phone] Fax [phone]  Expected CoPay:       CoPay Card Available:      Foundation Assistance Needed:    Which Pharmacy is filling the prescription (Not needed for infusion/clinic administered): [facility]  Pharmacy Notified: Yes  Patient Notified: Yes       1 person assist

## 2020-02-29 NOTE — PHYSICAL THERAPY INITIAL EVALUATION ADULT - ADDITIONAL COMMENTS
Lives with dtr in house.  1 step to enter without railing; 13 inside with railing.  Pt. owns walker, cane but didn't use them.

## 2020-02-29 NOTE — PHYSICAL THERAPY INITIAL EVALUATION ADULT - RANGE OF MOTION EXAMINATION, REHAB EVAL
right knee n/t due to surgical precautions/Left LE ROM was WFL (within functional limits)/bilateral upper extremity ROM was WFL (within functional limits)

## 2020-02-29 NOTE — PROGRESS NOTE ADULT - SUBJECTIVE AND OBJECTIVE BOX
ANDREY VENTURINO      65y Male s/p I&D and poly exchange right knee, c/o mild discomfort at the anterior knee                                                                                                                              POD # 1     STATUS POST:               Pre-Op Dx: Superficial postoperative wound infection    Post-Op Dx:  Infection of total right knee replacement    Procedure: Incision and drainage of knee with polyethylene liner exchange following total arthroplasty                                                  T(F): 97.8  HR: 114  BP: 114/66  RR: 19  SpO2: 96%                        11.6   9.78  )-----------( 288      ( 29 Feb 2020 06:54 )             34.8                     02-29    138  |  101  |  17  ----------------------------<  181<H>  4.0   |  27  |  1.32<H>    Ca    9.1      29 Feb 2020 06:54        Physical Exam :    -   Dressing C/D/I, Prevena functioning well, HV in place with 50 cc bloody fluid.   -   Distal Neurvascular status intact grossly.   -   Warm well perfused; capillary refill <3 seconds   -   (+)EHL/FHL 5/5  -   (+) Sensation to light touch  -   (-) Calf tenderness Bilaterally    A/P: 65y Male s/p I&D and poly exchange right knee    -   Ortho Stable  -   Pain control   -   Medicine to follow  -   F/u culture results and ID recommendations  -   Continue IV abx  -   DVT ppx:     [ x]SCDs     [x ] ASA     [ ] Eliquis     [ ] Lovenox  -   Weight bearing status:  WBAT [x ]        PWB    [ ]     TTWB  [ ]      NWB  [ ]   -  Dispo:     Home [x ]     Acute Rehab [ ]     ISSA [ ]     TBD [ ]

## 2020-03-01 ENCOUNTER — TRANSCRIPTION ENCOUNTER (OUTPATIENT)
Age: 65
End: 2020-03-01

## 2020-03-01 DIAGNOSIS — N17.9 ACUTE KIDNEY FAILURE, UNSPECIFIED: ICD-10-CM

## 2020-03-01 LAB
ALBUMIN SERPL ELPH-MCNC: 3.1 G/DL — LOW (ref 3.3–5)
ALP SERPL-CCNC: 70 U/L — SIGNIFICANT CHANGE UP (ref 30–120)
ALT FLD-CCNC: 31 U/L DA — SIGNIFICANT CHANGE UP (ref 10–60)
ANION GAP SERPL CALC-SCNC: 7 MMOL/L — SIGNIFICANT CHANGE UP (ref 5–17)
AST SERPL-CCNC: 17 U/L — SIGNIFICANT CHANGE UP (ref 10–40)
BILIRUB SERPL-MCNC: 1 MG/DL — SIGNIFICANT CHANGE UP (ref 0.2–1.2)
BUN SERPL-MCNC: 18 MG/DL — SIGNIFICANT CHANGE UP (ref 7–23)
CALCIUM SERPL-MCNC: 8.2 MG/DL — LOW (ref 8.4–10.5)
CHLORIDE SERPL-SCNC: 104 MMOL/L — SIGNIFICANT CHANGE UP (ref 96–108)
CO2 SERPL-SCNC: 30 MMOL/L — SIGNIFICANT CHANGE UP (ref 22–31)
CREAT SERPL-MCNC: 1.12 MG/DL — SIGNIFICANT CHANGE UP (ref 0.5–1.3)
GLUCOSE SERPL-MCNC: 109 MG/DL — HIGH (ref 70–99)
HCT VFR BLD CALC: 32.6 % — LOW (ref 39–50)
HGB BLD-MCNC: 10.8 G/DL — LOW (ref 13–17)
MCHC RBC-ENTMCNC: 30.3 PG — SIGNIFICANT CHANGE UP (ref 27–34)
MCHC RBC-ENTMCNC: 33.1 GM/DL — SIGNIFICANT CHANGE UP (ref 32–36)
MCV RBC AUTO: 91.6 FL — SIGNIFICANT CHANGE UP (ref 80–100)
NRBC # BLD: 0 /100 WBCS — SIGNIFICANT CHANGE UP (ref 0–0)
PLATELET # BLD AUTO: 267 K/UL — SIGNIFICANT CHANGE UP (ref 150–400)
POTASSIUM SERPL-MCNC: 3.5 MMOL/L — SIGNIFICANT CHANGE UP (ref 3.5–5.3)
POTASSIUM SERPL-SCNC: 3.5 MMOL/L — SIGNIFICANT CHANGE UP (ref 3.5–5.3)
PROT SERPL-MCNC: 5.9 G/DL — LOW (ref 6–8.3)
RBC # BLD: 3.56 M/UL — LOW (ref 4.2–5.8)
RBC # FLD: 13.7 % — SIGNIFICANT CHANGE UP (ref 10.3–14.5)
SODIUM SERPL-SCNC: 141 MMOL/L — SIGNIFICANT CHANGE UP (ref 135–145)
VANCOMYCIN TROUGH SERPL-MCNC: 10.7 UG/ML — SIGNIFICANT CHANGE UP (ref 10–20)
WBC # BLD: 10.35 K/UL — SIGNIFICANT CHANGE UP (ref 3.8–10.5)
WBC # FLD AUTO: 10.35 K/UL — SIGNIFICANT CHANGE UP (ref 3.8–10.5)

## 2020-03-01 PROCEDURE — 99232 SBSQ HOSP IP/OBS MODERATE 35: CPT

## 2020-03-01 RX ORDER — ACETAMINOPHEN 500 MG
650 TABLET ORAL EVERY 6 HOURS
Refills: 0 | Status: DISCONTINUED | OUTPATIENT
Start: 2020-03-01 | End: 2020-03-01

## 2020-03-01 RX ADMIN — AMLODIPINE BESYLATE 10 MILLIGRAM(S): 2.5 TABLET ORAL at 06:30

## 2020-03-01 RX ADMIN — PANTOPRAZOLE SODIUM 40 MILLIGRAM(S): 20 TABLET, DELAYED RELEASE ORAL at 06:30

## 2020-03-01 RX ADMIN — PIPERACILLIN AND TAZOBACTAM 25 GRAM(S): 4; .5 INJECTION, POWDER, LYOPHILIZED, FOR SOLUTION INTRAVENOUS at 09:07

## 2020-03-01 RX ADMIN — Medication 300 MILLIGRAM(S): at 06:31

## 2020-03-01 RX ADMIN — Medication 81 MILLIGRAM(S): at 06:30

## 2020-03-01 RX ADMIN — SENNA PLUS 2 TABLET(S): 8.6 TABLET ORAL at 21:31

## 2020-03-01 RX ADMIN — Medication 1000 MILLIGRAM(S): at 11:44

## 2020-03-01 RX ADMIN — Medication 81 MILLIGRAM(S): at 17:10

## 2020-03-01 RX ADMIN — Medication 1000 MILLIGRAM(S): at 11:11

## 2020-03-01 RX ADMIN — Medication 12.5 MILLIGRAM(S): at 06:33

## 2020-03-01 RX ADMIN — ATORVASTATIN CALCIUM 40 MILLIGRAM(S): 80 TABLET, FILM COATED ORAL at 21:31

## 2020-03-01 RX ADMIN — OXYCODONE HYDROCHLORIDE 5 MILLIGRAM(S): 5 TABLET ORAL at 06:30

## 2020-03-01 RX ADMIN — Medication 300 MILLIGRAM(S): at 17:10

## 2020-03-01 RX ADMIN — PIPERACILLIN AND TAZOBACTAM 25 GRAM(S): 4; .5 INJECTION, POWDER, LYOPHILIZED, FOR SOLUTION INTRAVENOUS at 19:16

## 2020-03-01 RX ADMIN — POLYETHYLENE GLYCOL 3350 17 GRAM(S): 17 POWDER, FOR SOLUTION ORAL at 11:10

## 2020-03-01 RX ADMIN — OXYCODONE HYDROCHLORIDE 5 MILLIGRAM(S): 5 TABLET ORAL at 07:00

## 2020-03-01 RX ADMIN — Medication 12.5 MILLIGRAM(S): at 17:10

## 2020-03-01 NOTE — PROGRESS NOTE ADULT - SUBJECTIVE AND OBJECTIVE BOX
ORTHOPEDIC PA PROGRESS NOTE  ANDREY PALAFOX      65y Male                                 SY 1EST 100 W1                                                                                                                           POD #    2d    STATUS POST:       Procedure: Incision and drainage of knee with polyethylene liner exchange following total arthroplasty             Patient seen and examined at bedside with dr adams    Current Pain Management:    acetaminophen   Tablet .. 1000 milliGRAM(s) Oral every 8 hours  acetaminophen   Tablet .. 650 milliGRAM(s) Oral every 6 hours PRN  HYDROmorphone  Injectable 0.5 milliGRAM(s) IV Push every 3 hours PRN  ondansetron Injectable 4 milliGRAM(s) IV Push every 6 hours PRN  oxyCODONE    IR 5 milliGRAM(s) Oral every 3 hours PRN  oxyCODONE    IR 10 milliGRAM(s) Oral every 3 hours PRN      T(F): 97.8  HR: 95  BP: 123/75  RR: 18  SpO2: 96%                         10.8   10.35 )-----------( 267      ( 01 Mar 2020 08:15 )             32.6         03-01    141  |  104  |  18  ----------------------------<  109<H>  3.5   |  30  |  1.12    Ca    8.2<L>      01 Mar 2020 08:15    TPro  5.9<L>  /  Alb  3.1<L>  /  TBili  1.0  /  DBili  x   /  AST  17  /  ALT  31  /  AlkPhos  70  03-01 02-29-20 @ 07:01  -  03-01-20 @ 07:00  --------------------------------------------------------  IN:    IV PiggyBack: 600 mL    lactated ringers.: 500 mL  Total IN: 1100 mL    OUT:    Accordian: 85 mL    Voided: 1200 mL  Total OUT: 1285 mL    Total NET: -185 mL        Physical Exam :    -   Dressing changed sterile. hv removed. Prevena intact. knee immobilizer intact  -   Wound C/D/I c prevena  -   Distal Neurvascular status intact grossly.   -   Warm well perfused; capillary refill <3 seconds   -   (+)EHL/FHL   -   (+) Sensation to light touch  -   (-) Calf tenderness Bilaterally      A/P: 65y Male s/p Incision and drainage of knee with polyethylene liner exchange following total arthroplasty     -   Ortho Stable  -   Pain control:  acetaminophen   Tablet .. 1000 milliGRAM(s) Oral every 8 hours  acetaminophen   Tablet .. 650 milliGRAM(s) Oral every 6 hours PRN  HYDROmorphone  Injectable 0.5 milliGRAM(s) IV Push every 3 hours PRN  ondansetron Injectable 4 milliGRAM(s) IV Push every 6 hours PRN  oxyCODONE    IR 5 milliGRAM(s) Oral every 3 hours PRN  oxyCODONE    IR 10 milliGRAM(s) Oral every 3 hours PRN    -   Medicine to follow  -   DVT ppx:    PAS +  aspirin enteric coated: 81 milliGRAM(s) Oral, aspirin enteric coated: 81 milliGRAM(s) Oral  -   FU Cultures and ID recommendations-zosyn and vanco for now  -   PT/OT OOB,  Weight bearing status: WBAT, Knee immobilizer   -  Dispo:  Home  -   Prescribed Medications:  aspirin 81 mg oral delayed release tablet: 1 tab(s) orally every 12 hours. Take at least 2 hours before celebrex  pantoprazole 40 mg oral delayed release tablet: 1 tab(s) orally once a day (before a meal)

## 2020-03-01 NOTE — DISCHARGE NOTE NURSING/CASE MANAGEMENT/SOCIAL WORK - PATIENT PORTAL LINK FT
You can access the FollowMyHealth Patient Portal offered by [facility] by registering at the following website: [email or web address]. By joining [facility]’s FollowMyHealth portal, you will also be able to view your health information using other applications (apps) compatible with our system.

## 2020-03-01 NOTE — DISCHARGE NOTE NURSING/CASE MANAGEMENT/SOCIAL WORK - NSSCNAMETXT_GEN_ALL_CORE
[facility] - Nurse to visit the day after hospital discharge; physical therapist to follow. Please contact the home care agency if you have not heard from them by 12 noon on the day after your hospital discharge. [facility] - [place], [place] [phone] or  [phone]

## 2020-03-01 NOTE — PROGRESS NOTE ADULT - SUBJECTIVE AND OBJECTIVE BOX
ANDREY PALAFOX is a 65yMale , patient examined and chart reviewed.     INTERVAL HPI/ OVERNIGHT EVENTS:   Awake alert. No distress.  Afebrile. N events.    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 , Being monitored by ophthalmologist , eye injections every 10 weeks and Laser Rx  Bladder polyp: 2010 being monitored by urologist  Osteoarthritis of right knee  High cholesterol  HTN (hypertension)  S/P reconstruction procedure: left arm reconstruction surgery s/p laceration of left forearm with powersaw  2010  History of cholecystectomy: 2016  H/O hernia repair: bilateral inguinal hernia 2018      For details regarding the patient's social history, family history, and other miscellaneous elements, please refer the initial infectious diseases consultation and/or the admitting history and physical examination for this admission    ROS:  CONSTITUTIONAL:  Negative fever or chills, feels well, good appetite  EYES:  Negative  blurry vision or double vision  CARDIOVASCULAR:  Negative for chest pain or palpitations  RESPIRATORY:  Negative for cough, wheezing, or SOB   GASTROINTESTINAL:  Negative for nausea, vomiting, diarrhea, constipation, or abdominal pain  GENITOURINARY:  Negative frequency, urgency or dysuria  NEUROLOGIC:  No headache, confusion, dizziness, lightheadedness  All other systems were reviewed and are negative         Current inpatient medications :    ANTIBIOTICS/RELEVANT:  piperacillin/tazobactam IVPB.. 3.375 Gram(s) IV Intermittent every 8 hours  vancomycin  IVPB 1500 milliGRAM(s) IV Intermittent every 12 hours      MEDICATIONS  (STANDING):  acetaminophen   Tablet .. 1000 milliGRAM(s) Oral every 8 hours  amLODIPine   Tablet 10 milliGRAM(s) Oral daily  aspirin enteric coated 81 milliGRAM(s) Oral every 12 hours  atorvastatin 40 milliGRAM(s) Oral at bedtime  influenza   Vaccine 0.5 milliLiter(s) IntraMuscular once  metoprolol tartrate 12.5 milliGRAM(s) Oral two times a day  pantoprazole    Tablet 40 milliGRAM(s) Oral before breakfast  polyethylene glycol 3350 17 Gram(s) Oral daily  senna 2 Tablet(s) Oral at bedtime    MEDICATIONS  (PRN):  aluminum hydroxide/magnesium hydroxide/simethicone Suspension 30 milliLiter(s) Oral four times a day PRN Indigestion  bisacodyl Suppository 10 milliGRAM(s) Rectal daily PRN If no bowel movement by postoperative day #2  HYDROmorphone  Injectable 0.5 milliGRAM(s) IV Push every 3 hours PRN Severe Pain (7 - 10)  magnesium hydroxide Suspension 30 milliLiter(s) Oral daily PRN Constipation  ondansetron Injectable 4 milliGRAM(s) IV Push every 6 hours PRN Nausea and/or Vomiting  oxyCODONE    IR 5 milliGRAM(s) Oral every 3 hours PRN Mild Pain (1 - 3)  oxyCODONE    IR 10 milliGRAM(s) Oral every 3 hours PRN Moderate Pain (4 - 6)      Objective:  Vital Signs Last 24 Hrs  T(C): 37 (01 Mar 2020 17:30), Max: 37 (01 Mar 2020 17:30)  T(F): 98.6 (01 Mar 2020 17:30), Max: 98.6 (01 Mar 2020 17:30)  HR: 95 (01 Mar 2020 17:30) (95 - 115)  BP: 128/75 (01 Mar 2020 17:30) (107/54 - 135/68)  RR: 17 (01 Mar 2020 17:30) (17 - 19)  SpO2: 97% (01 Mar 2020 17:30) (96% - 97%)      Physical Exam:  General: well developed well nourished, in no acute distress  Eyes: sclera anicteric, pupils equal and reactive to light  ENMT: buccal mucosa moist, pharynx not injected  Neck: supple, trachea midline  Lungs: clear, no wheeze/rhonchi  Cardiovascular: regular rate and rhythm, S1 S2  Abdomen: soft, nontender, no organomegaly present, bowel sounds normal  Neurological: alert and oriented x3, Cranial Nerves II-XII grossly intact  Skin: no increased ecchymosis/petechiae/purpura  Lymph Nodes: no palpable cervical/supraclavicular lymph nodes enlargements  Extremities: Right knee drsg c/d/i + drain      LABS:                        10.8   10.35 )-----------( 267      ( 01 Mar 2020 08:15 )             32.6   03-01    141  |  104  |  18  ----------------------------<  109<H>  3.5   |  30  |  1.12    Ca    8.2<L>      01 Mar 2020 08:15    TPro  5.9<L>  /  Alb  3.1<L>  /  TBili  1.0  /  DBili  x   /  AST  17  /  ALT  31  /  AlkPhos  70  03-01      PT/INR - ( 28 Feb 2020 12:17 )   PT: 12.4 sec;   INR: 1.11 ratio         PTT - ( 28 Feb 2020 12:17 )  PTT:31.5 sec      MICROBIOLOGY:    Culture - Surgical Swab (collected 29 Feb 2020 07:51)  Source: .Surgical Swab C&amp;S RIGHT KNEE SURGICAL SWAB 1  Preliminary Report (01 Mar 2020 12:39):    No growth    Culture - Surgical Swab (collected 28 Feb 2020 21:19)  Source: .Surgical Swab C&amp;S RIGHT KNEE SURGICAL SWAB 2  Preliminary Report (29 Feb 2020 16:25):    No growth    Culture - Tissue with Gram Stain (collected 28 Feb 2020 20:59)  Source: .Tissue Other  Gram Stain (29 Feb 2020 02:02):    Rare polymorphonuclear leukocytes seen per low power field    No organisms seen per oil power field  Preliminary Report (29 Feb 2020 21:16):    No growth    RADIOLOGY & ADDITIONAL STUDIES:    EXAM:  KNEE AP  LAT & OBL RIGHT                                  PROCEDURE DATE:  02/28/2020          INTERPRETATION:  XR KNEE AP AND LATERAL AND OBLIQUE RIGHT    CLINICAL INFORMATION:  intaop    AP, lateral intraoperative views of the right knee are submitted. Status post right knee arthroplasty with expected postsurgical changes    Assessment :  66yo M with pmh of htn, hld gerd, s/p RIGHT TKR Wyatt 15 2020 by Dr. 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 Per ortho - Sinus tract upper incision tracking into hole in medial retinaculum; fibrinous exudate, synovitis, yellow clear joint fluid +purulence and evidence or infection. Sp I&D washout and bearing exchange pod 2.Outpt surface culture with Pseudomonas. OR cultures NGTD.    Plan :   Cont Zosyn  Dc Vancomycin  Fu OR cultures  Will likely need long term IV antibiotic therapy  Increase activity      Continue with present regiment.  Appropriate use of antibiotics and adverse effects reviewed.      I have discussed the above plan of care with patient/ family in detail. They expressed understanding of the  treatment plan . Risks, benefits and alternatives discussed in detail. I have asked if they have any questions or concerns and appropriately addressed them to the best of my ability .    > 35 minutes were spent in direct patient care reviewing notes, medications ,labs data/ imaging , discussion with multidisciplinary team.    Thank you for allowing me to participate in care of your patient .    Candis Aguilar MD  Infectious Disease  [phone]

## 2020-03-01 NOTE — PROGRESS NOTE ADULT - SUBJECTIVE AND OBJECTIVE BOX
ANDREY VENTURINO      65y Male s/p I&D and poly exchange right knee                                                                                                                              POD # 2       STATUS POST:               Pre-Op Dx: Superficial postoperative wound infection    Post-Op Dx:  Infection of total right knee replacement    Procedure: Incision and drainage of knee with polyethylene liner exchange following total arthroplasty                                                  T(F): 97.8  HR: 95  BP: 123/75  RR: 18  SpO2: 96%                        10.8   10.35 )-----------( 267      ( 01 Mar 2020 08:15 )             32.6                     03-01    141  |  104  |  18  ----------------------------<  109<H>  3.5   |  30  |  1.12    Ca    8.2<L>      01 Mar 2020 08:15    TPro  5.9<L>  /  Alb  3.1<L>  /  TBili  1.0  /  DBili  x   /  AST  17  /  ALT  31  /  AlkPhos  70  03-01    Cultures negative to date      Physical Exam :    -   Prevena Dressing C/D/I, HV in place with minimal output.   -   Distal Neurvascular status intact grossly.   -   Warm well perfused; capillary refill <3 seconds   -   (+)EHL/FHL 5/5  -   (+) Sensation to light touch  -   (-) Calf tenderness Bilaterally    A/P: 65y Male s/p I&D and poly exchange right total knee replacement    -   Ortho Stable  -   Pain control   -   ID note appreciated  -   F/U cultures  -   Medicine to follow  -   DVT ppx:     [x ]SCDs     [ x] ASA     [ ] Eliquis     [ ] Lovenox  -   Weight bearing status:  WBAT [x ]        PWB    [ ]     TTWB  [ ]      NWB  [ ]   -  Dispo:     Home [x ]     Acute Rehab [ ]     ISSA [ ]     TBD [ ] ANDREY VENTURINO      65y Male s/p I&D and poly exchange right knee                                                                                                                              POD # 2       STATUS POST:               Pre-Op Dx: Superficial postoperative wound infection    Post-Op Dx:  Infection of total right knee replacement    Procedure: Incision and drainage of knee with polyethylene liner exchange following total arthroplasty                                                  T(F): 97.8  HR: 95  BP: 123/75  RR: 18  SpO2: 96%                        10.8   10.35 )-----------( 267      ( 01 Mar 2020 08:15 )             32.6                     03-01    141  |  104  |  18  ----------------------------<  109<H>  3.5   |  30  |  1.12    Ca    8.2<L>      01 Mar 2020 08:15    TPro  5.9<L>  /  Alb  3.1<L>  /  TBili  1.0  /  DBili  x   /  AST  17  /  ALT  31  /  AlkPhos  70  03-01    Cultures negative to date      Physical Exam :    -   Prevena Dressing C/D/I, HV in place with minimal output.   -   Distal Neurvascular status intact grossly.   -   Warm well perfused; capillary refill <3 seconds   -   (+)EHL/FHL 5/5  -   (+) Sensation to light touch  -   (-) Calf tenderness Bilaterally    A/P: 65y Male s/p I&D and poly exchange right total knee replacement    -   Ortho Stable  -   HV d/c'd  -   Pain control   -   ID note appreciated  -   F/U cultures  -   Medicine to follow  -   DVT ppx:     [x ]SCDs     [ x] ASA     [ ] Eliquis     [ ] Lovenox  -   Weight bearing status:  WBAT [x ]        PWB    [ ]     TTWB  [ ]      NWB  [ ]   -  Dispo:     Home [x ]     Acute Rehab [ ]     ISSA [ ]     TBD [ ]

## 2020-03-01 NOTE — DISCHARGE NOTE NURSING/CASE MANAGEMENT/SOCIAL WORK - NSDCDMETYPESERV_GEN_ALL_CORE_FT
Prior to this admission, you have a rolling walker, commode, and cane in the home. Your daughter ordered you a hip kit to be delivered to the home.

## 2020-03-01 NOTE — PROGRESS NOTE ADULT - SUBJECTIVE AND OBJECTIVE BOX
Patient is a 65y old  Male who presents with a chief complaint of incisional drainage right knee (29 Feb 2020 18:56)        HPI:  64yo M with pmh of htn, hld gerd,  admitted s/p RIGHT TKR Wyatt 15, presented to be admitted to have right knee washed out in the OR. Had right knee replacement on Jan 15th by Dr. Fernández. Noticed drainage from surgical incision about 10 days ago. was seen by Dr. Fernández in the office, knee fluid aspirated and sent for cultures on 2/24/20 grew out few pseudomonas. Was started on Abx 1.5-2 weeks ago (cipro?). . No fevers, chills, or body aches. Was seen in office yesterday and told to come to ED today to have knee washed out. no current pain. last meal last evening 8:00 pm (hot dog and beans). took meds this am around 9-9:30. (28 Feb 2020 12:49)      SUBJECTIVE & OBJECTIVE: Pt seen and examined at bedside. no acute complaints , awaitintg culture     PHYSICAL EXAM:  T(C): 36.8 (03-01-20 @ 04:52), Max: 36.8 (02-29-20 @ 14:04)  HR: 100 (03-01-20 @ 04:52) (100 - 125)  BP: 135/68 (03-01-20 @ 04:52) (106/58 - 135/68)  RR: 18 (03-01-20 @ 04:52) (18 - 19)  SpO2: 96% (03-01-20 @ 04:52) (95% - 98%)  Wt(kg): --   GENERAL: NAD, well-groomed, well-developed  HEAD:  Atraumatic, Normocephalic  EYES: EOMI, PERRLA, conjunctiva and sclera clear  ENMT: Moist mucous membranes  NECK: Supple, No JVD  NERVOUS SYSTEM:  Alert & Oriented X3,  CHEST/LUNG: Clear to auscultation bilaterally; No rales, rhonchi, wheezing, or rubs  HEART: Regular rate and rhythm; No murmurs, rubs, or gallops  ABDOMEN: Soft, Nontender, Nondistended; Bowel sounds present  EXTREMITIES:  2+ Peripheral Pulses, No clubbing, cyanosis, or edema        MEDICATIONS  (STANDING):  acetaminophen   Tablet .. 1000 milliGRAM(s) Oral every 8 hours  amLODIPine   Tablet 10 milliGRAM(s) Oral daily  aspirin enteric coated 81 milliGRAM(s) Oral every 12 hours  atorvastatin 40 milliGRAM(s) Oral at bedtime  influenza   Vaccine 0.5 milliLiter(s) IntraMuscular once  metoprolol tartrate 12.5 milliGRAM(s) Oral two times a day  pantoprazole    Tablet 40 milliGRAM(s) Oral before breakfast  piperacillin/tazobactam IVPB.. 3.375 Gram(s) IV Intermittent every 8 hours  polyethylene glycol 3350 17 Gram(s) Oral daily  senna 2 Tablet(s) Oral at bedtime  vancomycin  IVPB 1500 milliGRAM(s) IV Intermittent every 12 hours    MEDICATIONS  (PRN):  acetaminophen   Tablet .. 650 milliGRAM(s) Oral every 6 hours PRN Mild Pain (1 - 3)  aluminum hydroxide/magnesium hydroxide/simethicone Suspension 30 milliLiter(s) Oral four times a day PRN Indigestion  bisacodyl Suppository 10 milliGRAM(s) Rectal daily PRN If no bowel movement by postoperative day #2  HYDROmorphone  Injectable 0.5 milliGRAM(s) IV Push every 3 hours PRN Severe Pain (7 - 10)  magnesium hydroxide Suspension 30 milliLiter(s) Oral daily PRN Constipation  ondansetron Injectable 4 milliGRAM(s) IV Push every 6 hours PRN Nausea and/or Vomiting  oxyCODONE    IR 5 milliGRAM(s) Oral every 3 hours PRN Mild Pain (1 - 3)  oxyCODONE    IR 10 milliGRAM(s) Oral every 3 hours PRN Moderate Pain (4 - 6)      LABS:                        10.8   10.35 )-----------( 267      ( 01 Mar 2020 08:15 )             32.6     03-01    141  |  104  |  18  ----------------------------<  109<H>  3.5   |  30  |  1.12    Ca    8.2<L>      01 Mar 2020 08:15    TPro  5.9<L>  /  Alb  3.1<L>  /  TBili  1.0  /  DBili  x   /  AST  17  /  ALT  31  /  AlkPhos  70  03-01    PT/INR - ( 28 Feb 2020 12:17 )   PT: 12.4 sec;   INR: 1.11 ratio         PTT - ( 28 Feb 2020 12:17 )  PTT:31.5 sec      CAPILLARY BLOOD GLUCOSE          CAPILLARY BLOOD GLUCOSE        CAPILLARY BLOOD GLUCOSE                RECENT CULTURES:      RADIOLOGY & ADDITIONAL TESTS:                        DVT/GI ppx  Discussed with pt @ bedside

## 2020-03-01 NOTE — PROGRESS NOTE ADULT - SUBJECTIVE AND OBJECTIVE BOX
Patient is a 65y Male with a known history of :  JEANETTE (acute kidney injury) (N17.9)  HTN (hypertension) (I10)  High cholesterol (E78.00)  History of hypertensive retinopathy (Z86.69)  Knee effusion, right (M25.461)    HPI:  66yo M with pmh of htn, hld gerd,  admitted s/p RIGHT TKR Wyatt 15, presented to be admitted to have right knee washed out in the OR. Had right knee replacement on Jan 15th by Dr. Fernández. Noticed drainage from surgical incision about 10 days ago. was seen by Dr. Fernández in the office, knee fluid aspirated and sent for cultures on 2/24/20 grew out few pseudomonas. Was started on Abx 1.5-2 weeks ago (cipro?). . No fevers, chills, or body aches. Was seen in office yesterday and told to come to ED today to have knee washed out. no current pain. last meal last evening 8:00 pm (hot dog and beans). took meds this am around 9-9:30. (28 Feb 2020 12:49)      REVIEW OF SYSTEMS:    CONSTITUTIONAL: No fever, weight loss, or fatigue  EYES: No eye pain, visual disturbances, or discharge  ENMT:  No difficulty hearing, tinnitus, vertigo; No sinus or throat pain  NECK: No pain or stiffness  BREASTS: No pain, masses, or nipple discharge  RESPIRATORY: No cough, wheezing, chills or hemoptysis; No shortness of breath  CARDIOVASCULAR: No chest pain, palpitations, dizziness, or leg swelling  GASTROINTESTINAL: No abdominal or epigastric pain. No nausea, vomiting, or hematemesis; No diarrhea or constipation. No melena or hematochezia.  GENITOURINARY: No dysuria, frequency, hematuria, or incontinence  NEUROLOGICAL: No headaches, memory loss, loss of strength, numbness, or tremors  SKIN: No itching, burning, rashes, or lesions   LYMPH NODES: No enlarged glands  ENDOCRINE: No heat or cold intolerance; No hair loss  MUSCULOSKELETAL: No joint pain or swelling; No muscle, back, or extremity pain  PSYCHIATRIC: No depression, anxiety, mood swings, or difficulty sleeping  HEME/LYMPH: No easy bruising, or bleeding gums  ALLERGY AND IMMUNOLOGIC: No hives or eczema    MEDICATIONS  (STANDING):  acetaminophen   Tablet .. 1000 milliGRAM(s) Oral every 8 hours  amLODIPine   Tablet 10 milliGRAM(s) Oral daily  aspirin enteric coated 81 milliGRAM(s) Oral every 12 hours  atorvastatin 40 milliGRAM(s) Oral at bedtime  influenza   Vaccine 0.5 milliLiter(s) IntraMuscular once  metoprolol tartrate 12.5 milliGRAM(s) Oral two times a day  pantoprazole    Tablet 40 milliGRAM(s) Oral before breakfast  piperacillin/tazobactam IVPB.. 3.375 Gram(s) IV Intermittent every 8 hours  polyethylene glycol 3350 17 Gram(s) Oral daily  senna 2 Tablet(s) Oral at bedtime  vancomycin  IVPB 1500 milliGRAM(s) IV Intermittent every 12 hours    MEDICATIONS  (PRN):  acetaminophen   Tablet .. 650 milliGRAM(s) Oral every 6 hours PRN Mild Pain (1 - 3)  aluminum hydroxide/magnesium hydroxide/simethicone Suspension 30 milliLiter(s) Oral four times a day PRN Indigestion  bisacodyl Suppository 10 milliGRAM(s) Rectal daily PRN If no bowel movement by postoperative day #2  HYDROmorphone  Injectable 0.5 milliGRAM(s) IV Push every 3 hours PRN Severe Pain (7 - 10)  magnesium hydroxide Suspension 30 milliLiter(s) Oral daily PRN Constipation  ondansetron Injectable 4 milliGRAM(s) IV Push every 6 hours PRN Nausea and/or Vomiting  oxyCODONE    IR 5 milliGRAM(s) Oral every 3 hours PRN Mild Pain (1 - 3)  oxyCODONE    IR 10 milliGRAM(s) Oral every 3 hours PRN Moderate Pain (4 - 6)      ALLERGIES: No Known Allergies      FAMILY HISTORY:  Family history of sarcoidosis: father  Family history of lung disease: mesothelioma      Social history:  Alochol:   Smoking:   Drug Use:   Marital Status:     PHYSICAL EXAMINATION:  -----------------------------  T(C): 36.6 (03-01-20 @ 10:02), Max: 36.8 (02-29-20 @ 14:04)  HR: 95 (03-01-20 @ 10:02) (95 - 125)  BP: 123/75 (03-01-20 @ 10:02) (106/58 - 135/68)  RR: 18 (03-01-20 @ 10:02) (18 - 18)  SpO2: 96% (03-01-20 @ 10:02) (95% - 98%)  Wt(kg): --    02-29 @ 07:01  -  03-01 @ 07:00  --------------------------------------------------------  IN:    IV PiggyBack: 600 mL    lactated ringers.: 500 mL  Total IN: 1100 mL    OUT:    Accordian: 85 mL    Voided: 1200 mL  Total OUT: 1285 mL    Total NET: -185 mL            Constitutional: well developed, normal appearance, well groomed, well nourished, no deformities and no acute distress.   Eyes: the conjunctiva exhibited no abnormalities and the eyelids demonstrated no xanthelasmas.   HEENT: normal oral mucosa, no oral pallor and no oral cyanosis.   Neck: normal jugular venous A waves present, normal jugular venous V waves present and no jugular venous mijares A waves.   Pulmonary: no respiratory distress, normal respiratory rhythm and effort, no accessory muscle use and lungs were clear to auscultation bilaterally.   Cardiovascular: heart rate and rhythm were normal, normal S1 and S2 and no murmur, gallop, rub, heave or thrill are present.   Musculoskeletal: the gait could not be assessed.  Extremities: no clubbing of the fingernails, no localized cyanosis, no petechial hemorrhages and no ischemic changes.   Skin: normal skin color and pigmentation, no rash, no venous stasis, no skin lesions, no skin ulcer and no xanthoma was observed.   Psychiatric: oriented to person, place, and time, the affect was normal, the mood was normal and not feeling anxious.     LABS:   --------  03-01    141  |  104  |  18  ----------------------------<  109<H>  3.5   |  30  |  1.12    Ca    8.2<L>      01 Mar 2020 08:15    TPro  5.9<L>  /  Alb  3.1<L>  /  TBili  1.0  /  DBili  x   /  AST  17  /  ALT  31  /  AlkPhos  70  03-01                         10.8   10.35 )-----------( 267      ( 01 Mar 2020 08:15 )             32.6     PT/INR - ( 28 Feb 2020 12:17 )   PT: 12.4 sec;   INR: 1.11 ratio         PTT - ( 28 Feb 2020 12:17 )  PTT:31.5 sec        Culture Results:   No growth (02-28 @ 21:19)  Culture Results:   No growth (02-28 @ 20:59)    02-28 @ 21:19    Organism --   Gram Stain Blood -- Gram Stain --  Specimen Source .Surgical Swab C&S RIGHT KNEE SURGICAL SWAB 2  Culture-Blood --    02-28 @ 20:59    Organism --   Gram Stain Blood -- Gram Stain   Rare polymorphonuclear leukocytes seen per low power field  No organisms seen per oil power field  Specimen Source .Tissue Other  Culture-Blood --        Radiology:

## 2020-03-02 LAB
ANION GAP SERPL CALC-SCNC: 4 MMOL/L — LOW (ref 5–17)
BUN SERPL-MCNC: 20 MG/DL — SIGNIFICANT CHANGE UP (ref 7–23)
CALCIUM SERPL-MCNC: 9.4 MG/DL — SIGNIFICANT CHANGE UP (ref 8.4–10.5)
CHLORIDE SERPL-SCNC: 105 MMOL/L — SIGNIFICANT CHANGE UP (ref 96–108)
CO2 SERPL-SCNC: 34 MMOL/L — HIGH (ref 22–31)
CREAT SERPL-MCNC: 1.29 MG/DL — SIGNIFICANT CHANGE UP (ref 0.5–1.3)
GLUCOSE SERPL-MCNC: 115 MG/DL — HIGH (ref 70–99)
HCT VFR BLD CALC: 33.8 % — LOW (ref 39–50)
HGB BLD-MCNC: 11.1 G/DL — LOW (ref 13–17)
MCHC RBC-ENTMCNC: 30.2 PG — SIGNIFICANT CHANGE UP (ref 27–34)
MCHC RBC-ENTMCNC: 32.8 GM/DL — SIGNIFICANT CHANGE UP (ref 32–36)
MCV RBC AUTO: 91.8 FL — SIGNIFICANT CHANGE UP (ref 80–100)
NRBC # BLD: 0 /100 WBCS — SIGNIFICANT CHANGE UP (ref 0–0)
PLATELET # BLD AUTO: 264 K/UL — SIGNIFICANT CHANGE UP (ref 150–400)
POTASSIUM SERPL-MCNC: 3.5 MMOL/L — SIGNIFICANT CHANGE UP (ref 3.5–5.3)
POTASSIUM SERPL-SCNC: 3.5 MMOL/L — SIGNIFICANT CHANGE UP (ref 3.5–5.3)
RBC # BLD: 3.68 M/UL — LOW (ref 4.2–5.8)
RBC # FLD: 13.7 % — SIGNIFICANT CHANGE UP (ref 10.3–14.5)
SODIUM SERPL-SCNC: 143 MMOL/L — SIGNIFICANT CHANGE UP (ref 135–145)
WBC # BLD: 9.42 K/UL — SIGNIFICANT CHANGE UP (ref 3.8–10.5)
WBC # FLD AUTO: 9.42 K/UL — SIGNIFICANT CHANGE UP (ref 3.8–10.5)

## 2020-03-02 PROCEDURE — 99232 SBSQ HOSP IP/OBS MODERATE 35: CPT

## 2020-03-02 RX ORDER — METOPROLOL TARTRATE 50 MG
25 TABLET ORAL
Refills: 0 | Status: DISCONTINUED | OUTPATIENT
Start: 2020-03-02 | End: 2020-03-04

## 2020-03-02 RX ADMIN — Medication 1000 MILLIGRAM(S): at 01:05

## 2020-03-02 RX ADMIN — Medication 1000 MILLIGRAM(S): at 18:01

## 2020-03-02 RX ADMIN — PANTOPRAZOLE SODIUM 40 MILLIGRAM(S): 20 TABLET, DELAYED RELEASE ORAL at 06:03

## 2020-03-02 RX ADMIN — ATORVASTATIN CALCIUM 40 MILLIGRAM(S): 80 TABLET, FILM COATED ORAL at 21:39

## 2020-03-02 RX ADMIN — Medication 1000 MILLIGRAM(S): at 02:05

## 2020-03-02 RX ADMIN — AMLODIPINE BESYLATE 10 MILLIGRAM(S): 2.5 TABLET ORAL at 06:04

## 2020-03-02 RX ADMIN — PIPERACILLIN AND TAZOBACTAM 25 GRAM(S): 4; .5 INJECTION, POWDER, LYOPHILIZED, FOR SOLUTION INTRAVENOUS at 08:43

## 2020-03-02 RX ADMIN — Medication 1000 MILLIGRAM(S): at 17:31

## 2020-03-02 RX ADMIN — Medication 25 MILLIGRAM(S): at 17:22

## 2020-03-02 RX ADMIN — Medication 1000 MILLIGRAM(S): at 11:02

## 2020-03-02 RX ADMIN — Medication 12.5 MILLIGRAM(S): at 06:04

## 2020-03-02 RX ADMIN — Medication 81 MILLIGRAM(S): at 06:04

## 2020-03-02 RX ADMIN — PIPERACILLIN AND TAZOBACTAM 25 GRAM(S): 4; .5 INJECTION, POWDER, LYOPHILIZED, FOR SOLUTION INTRAVENOUS at 17:21

## 2020-03-02 RX ADMIN — Medication 81 MILLIGRAM(S): at 17:22

## 2020-03-02 RX ADMIN — Medication 1000 MILLIGRAM(S): at 11:32

## 2020-03-02 RX ADMIN — PIPERACILLIN AND TAZOBACTAM 25 GRAM(S): 4; .5 INJECTION, POWDER, LYOPHILIZED, FOR SOLUTION INTRAVENOUS at 01:05

## 2020-03-02 NOTE — PROGRESS NOTE ADULT - SUBJECTIVE AND OBJECTIVE BOX
ANDREY PALAFOX is a 65yMale , patient examined and chart reviewed.     INTERVAL HPI/ OVERNIGHT EVENTS:   Awake alert. No distress.  Afebrile. No events.    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 , Being monitored by ophthalmologist , eye injections every 10 weeks and Laser Rx  Bladder polyp: 2010 being monitored by urologist  Osteoarthritis of right knee  High cholesterol  HTN (hypertension)  S/P reconstruction procedure: left arm reconstruction surgery s/p laceration of left forearm with powersaw  2010  History of cholecystectomy: 2016  H/O hernia repair: bilateral inguinal hernia 2018      For details regarding the patient's social history, family history, and other miscellaneous elements, please refer the initial infectious diseases consultation and/or the admitting history and physical examination for this admission    ROS:  CONSTITUTIONAL:  Negative fever or chills, feels well, good appetite  EYES:  Negative  blurry vision or double vision  CARDIOVASCULAR:  Negative for chest pain or palpitations  RESPIRATORY:  Negative for cough, wheezing, or SOB   GASTROINTESTINAL:  Negative for nausea, vomiting, diarrhea, constipation, or abdominal pain  GENITOURINARY:  Negative frequency, urgency or dysuria  NEUROLOGIC:  No headache, confusion, dizziness, lightheadedness  All other systems were reviewed and are negative         Current inpatient medications :    ANTIBIOTICS/RELEVANT:  piperacillin/tazobactam IVPB.. 3.375 Gram(s) IV Intermittent every 8 hours    MEDICATIONS  (STANDING):  acetaminophen   Tablet .. 1000 milliGRAM(s) Oral every 8 hours  amLODIPine   Tablet 10 milliGRAM(s) Oral daily  aspirin enteric coated 81 milliGRAM(s) Oral every 12 hours  atorvastatin 40 milliGRAM(s) Oral at bedtime  influenza   Vaccine 0.5 milliLiter(s) IntraMuscular once  metoprolol tartrate 25 milliGRAM(s) Oral two times a day  pantoprazole    Tablet 40 milliGRAM(s) Oral before breakfast  polyethylene glycol 3350 17 Gram(s) Oral daily  senna 2 Tablet(s) Oral at bedtime    MEDICATIONS  (PRN):  aluminum hydroxide/magnesium hydroxide/simethicone Suspension 30 milliLiter(s) Oral four times a day PRN Indigestion  bisacodyl Suppository 10 milliGRAM(s) Rectal daily PRN If no bowel movement by postoperative day #2  HYDROmorphone  Injectable 0.5 milliGRAM(s) IV Push every 3 hours PRN Severe Pain (7 - 10)  magnesium hydroxide Suspension 30 milliLiter(s) Oral daily PRN Constipation  ondansetron Injectable 4 milliGRAM(s) IV Push every 6 hours PRN Nausea and/or Vomiting  oxyCODONE    IR 5 milliGRAM(s) Oral every 3 hours PRN Mild Pain (1 - 3)  oxyCODONE    IR 10 milliGRAM(s) Oral every 3 hours PRN Moderate Pain (4 - 6)      Objective:  Vital Signs Last 24 Hrs  T(C): 36.7 (02 Mar 2020 14:29), Max: 37 (01 Mar 2020 17:30)  T(F): 98.1 (02 Mar 2020 14:29), Max: 98.6 (01 Mar 2020 17:30)  HR: 117 (02 Mar 2020 14:29) (87 - 117)  BP: 123/68 (02 Mar 2020 14:29) (120/73 - 134/79)  RR: 18 (02 Mar 2020 14:29) (17 - 19)  SpO2: 97% (02 Mar 2020 14:29) (95% - 97%)	    Physical Exam:  General: no acute distress  Eyes: sclera anicteric, pupils equal and reactive to light  ENMT: buccal mucosa moist, pharynx not injected  Neck: supple, trachea midline  Lungs: clear, no wheeze/rhonchi  Cardiovascular: regular rate and rhythm, S1 S2  Abdomen: soft, nontender, no organomegaly present, bowel sounds normal  Neurological: alert and oriented x3, Cranial Nerves II-XII grossly intact  Skin: no increased ecchymosis/petechiae/purpura  Lymph Nodes: no palpable cervical/supraclavicular lymph nodes enlargements  Extremities: Right knee drsg c/d/i + drain      LABS:                        11.1   9.42  )-----------( 264      ( 02 Mar 2020 07:17 )             33.8   03-02    143  |  105  |  20  ----------------------------<  115<H>  3.5   |  34<H>  |  1.29    Ca    9.4      02 Mar 2020 07:17    TPro  5.9<L>  /  Alb  3.1<L>  /  TBili  1.0  /  DBili  x   /  AST  17  /  ALT  31  /  AlkPhos  70  03-01    MICROBIOLOGY:    Culture - Surgical Swab (collected 29 Feb 2020 07:51)  Source: .Surgical Swab C&amp;S RIGHT KNEE SURGICAL SWAB 1  Preliminary Report (01 Mar 2020 12:39):    No growth    Culture - Surgical Swab (collected 28 Feb 2020 21:19)  Source: .Surgical Swab C&amp;S RIGHT KNEE SURGICAL SWAB 2  Preliminary Report (29 Feb 2020 16:25):    No growth    Culture - Tissue with Gram Stain (collected 28 Feb 2020 20:59)  Source: .Tissue Other  Gram Stain (29 Feb 2020 02:02):    Rare polymorphonuclear leukocytes seen per low power field    No organisms seen per oil power field  Preliminary Report (29 Feb 2020 21:16):    No growth    RADIOLOGY & ADDITIONAL STUDIES:    EXAM:  KNEE AP  LAT & OBL RIGHT                          PROCEDURE DATE:  02/28/2020        INTERPRETATION:  XR KNEE AP AND LATERAL AND OBLIQUE RIGHT    CLINICAL INFORMATION:  intaop    AP, lateral intraoperative views of the right knee are submitted. Status post right knee arthroplasty with expected postsurgical changes    Assessment :  66yo M with pmh of htn, hld gerd, s/p RIGHT TKR Wyatt 15 2020 by Dr. 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 Per ortho - Sinus tract upper incision tracking into hole in medial retinaculum; fibrinous exudate, synovitis, yellow clear joint fluid +purulence and evidence or infection. Sp I&D washout and bearing exchange 2/29/2020..Outpt surface culture with Pseudomonas. OR cultures NGTD.    Plan :   Cont Zosyn x 4 weeks  OR cultures thus far NGTD  MIdline  Increase activity  Dc planning    D/w Dr Fernández        Continue with present regiment.  Appropriate use of antibiotics and adverse effects reviewed.      I have discussed the above plan of care with patient/ family in detail. They expressed understanding of the  treatment plan . Risks, benefits and alternatives discussed in detail. I have asked if they have any questions or concerns and appropriately addressed them to the best of my ability .    > 35 minutes were spent in direct patient care reviewing notes, medications ,labs data/ imaging , discussion with multidisciplinary team.    Thank you for allowing me to participate in care of your patient .    Candis Aguilar MD  Infectious Disease  [phone]

## 2020-03-02 NOTE — PROGRESS NOTE ADULT - SUBJECTIVE AND OBJECTIVE BOX
Patient is a 65y old  Male who presents with a chief complaint of incisional drainage right knee (02 Mar 2020 09:53)        HPI:  66yo M with pmh of htn, hld gerd,  admitted s/p RIGHT TKR Wyatt 15, presented to be admitted to have right knee washed out in the OR. Had right knee replacement on Jan 15th by Dr. Fernández. Noticed drainage from surgical incision about 10 days ago. was seen by Dr. Fernández in the office, knee fluid aspirated and sent for cultures on 2/24/20 grew out few pseudomonas. Was started on Abx 1.5-2 weeks ago (cipro?). . No fevers, chills, or body aches. Was seen in office yesterday and told to come to ED today to have knee washed out. no current pain. last meal last evening 8:00 pm (hot dog and beans). took meds this am around 9-9:30. (28 Feb 2020 12:49)      SUBJECTIVE & OBJECTIVE: Pt seen and examined at bedside. no acute complaints     PHYSICAL EXAM:  T(C): 36.5 (03-02-20 @ 05:03), Max: 37 (03-01-20 @ 17:30)  HR: 87 (03-02-20 @ 05:03) (87 - 110)  BP: 134/79 (03-02-20 @ 05:03) (107/54 - 134/79)  RR: 18 (03-02-20 @ 05:03) (17 - 19)  SpO2: 97% (03-02-20 @ 05:03) (95% - 97%)  Wt(kg): --   GENERAL: NAD, well-groomed, well-developed  HEAD:  Atraumatic, Normocephalic  EYES: EOMI, PERRLA, conjunctiva and sclera clear  NERVOUS SYSTEM:  Alert & Oriented X3,  CHEST/LUNG: decrease air entry at the bases   HEART: Regular rate and rhythm; No murmurs, rubs, or gallops  ABDOMEN: Soft, Nontender, Nondistended; Bowel sounds present  EXTREMITIES:  2+ Peripheral Pulses, No clubbing, cyanosis, or edema        MEDICATIONS  (STANDING):  acetaminophen   Tablet .. 1000 milliGRAM(s) Oral every 8 hours  amLODIPine   Tablet 10 milliGRAM(s) Oral daily  aspirin enteric coated 81 milliGRAM(s) Oral every 12 hours  atorvastatin 40 milliGRAM(s) Oral at bedtime  influenza   Vaccine 0.5 milliLiter(s) IntraMuscular once  metoprolol tartrate 12.5 milliGRAM(s) Oral two times a day  pantoprazole    Tablet 40 milliGRAM(s) Oral before breakfast  piperacillin/tazobactam IVPB.. 3.375 Gram(s) IV Intermittent every 8 hours  polyethylene glycol 3350 17 Gram(s) Oral daily  senna 2 Tablet(s) Oral at bedtime    MEDICATIONS  (PRN):  aluminum hydroxide/magnesium hydroxide/simethicone Suspension 30 milliLiter(s) Oral four times a day PRN Indigestion  bisacodyl Suppository 10 milliGRAM(s) Rectal daily PRN If no bowel movement by postoperative day #2  HYDROmorphone  Injectable 0.5 milliGRAM(s) IV Push every 3 hours PRN Severe Pain (7 - 10)  magnesium hydroxide Suspension 30 milliLiter(s) Oral daily PRN Constipation  ondansetron Injectable 4 milliGRAM(s) IV Push every 6 hours PRN Nausea and/or Vomiting  oxyCODONE    IR 5 milliGRAM(s) Oral every 3 hours PRN Mild Pain (1 - 3)  oxyCODONE    IR 10 milliGRAM(s) Oral every 3 hours PRN Moderate Pain (4 - 6)      LABS:                        11.1   9.42  )-----------( 264      ( 02 Mar 2020 07:17 )             33.8     03-02    143  |  105  |  20  ----------------------------<  115<H>  3.5   |  34<H>  |  1.29    Ca    9.4      02 Mar 2020 07:17    TPro  5.9<L>  /  Alb  3.1<L>  /  TBili  1.0  /  DBili  x   /  AST  17  /  ALT  31  /  AlkPhos  70  03-01          CAPILLARY BLOOD GLUCOSE          CAPILLARY BLOOD GLUCOSE        CAPILLARY BLOOD GLUCOSE                RECENT CULTURES:      RADIOLOGY & ADDITIONAL TESTS:                        DVT/GI ppx  Discussed with pt @ bedside

## 2020-03-02 NOTE — PROGRESS NOTE ADULT - SUBJECTIVE AND OBJECTIVE BOX
ANDREY VIVIENNE                                                               [id number]                                                       Allergies---No Known Allergies        Pt is a 65y year old Male s/p I+D right TKR.   Pt. is A&O x 3, resting comfortably, with no complaints.   Pain is 2/10.   Tolerating the diet.   Denies chest pain / shortness of breath / dyspnea / nausea / vomiting / headaches or light headed ness.         Vital Signs Last 24 Hrs  T(F): 97.7 (03-02-20 @ 05:03), Max: 98.4 (03-02-20 @ 01:21)  HR: 87 (03-02-20 @ 05:03)  BP: 134/79 (03-02-20 @ 05:03)  RR: 18 (03-02-20 @ 05:03)  SpO2: 97% (03-02-20 @ 05:03)    I&O's Detail    01 Mar 2020 07:01  -  02 Mar 2020 07:00  --------------------------------------------------------  IN:  Total IN: 0 mL    OUT:    Voided: 400 mL  Total OUT: 400 mL    Total NET: -400 mL        PE:   Right Lower Extremity:   Pt is currently in a knee immobilizer.   Haven dressing is C/D/I and secured well.   No redness, swelling, heat, discharge or increase pain and tenderness noted.   Neurovascularly intact.   No gross evidence of motor or sensory deficit.   +2 DP/PT pulses.   EHL/FHL/TA intact.   Toes are pink and mobile.   Negative calf tenderness.   PAS on.                              11.1   9.42  )--------------( 264                          03-02-20 @ 07:17               33.8         143   |  105  |  20  -----------------------------<  115                  03-02-20 @ 07:17  3.5    |  34    |  1.29        Ca    9.4              A:   Pt is a 65y year old Male S/P I+D right total knee replacement, Post Op Day #3        Plan:    - Follow up with Medicine and ID   - continue ABx as advised   - possible PICC placement   - follow lab cultures   - OOB with PT/OT   - Pain control    - Incentive spirometry   - Discharge Planning when ok'd by Medicine and ID   - DVT ppx = PAS +  aspirin enteric coated 81 milliGRAM(s) Oral every 12 hours                                                                                                                                                                             Nasir TARANGO ANDREY VIVIENNE                                                               [id number]                                                       Allergies---No Known Allergies        Pt is a 65y year old Male s/p I+D right TKR.   Pt. is A&O x 3, resting comfortably, with no complaints.   Pain is 2/10.   Tolerating the diet.   Denies chest pain / shortness of breath / dyspnea / nausea / vomiting / headaches or light headed ness.         Vital Signs Last 24 Hrs  T(F): 97.7 (03-02-20 @ 05:03), Max: 98.4 (03-02-20 @ 01:21)  HR: 87 (03-02-20 @ 05:03)  BP: 134/79 (03-02-20 @ 05:03)  RR: 18 (03-02-20 @ 05:03)  SpO2: 97% (03-02-20 @ 05:03)    I&O's Detail    01 Mar 2020 07:01  -  02 Mar 2020 07:00  --------------------------------------------------------  IN:  Total IN: 0 mL    OUT:    Voided: 400 mL  Total OUT: 400 mL    Total NET: -400 mL        PE:   Right Lower Extremity:   Pt is currently in a knee immobilizer.   Prevena dressing is C/D/I and secured well.   No redness, swelling, heat, discharge or increase pain and tenderness noted.   Neurovascularly intact.   No gross evidence of motor or sensory deficit.   +2 DP/PT pulses.   EHL/FHL/TA intact.   Toes are pink and mobile.   Negative calf tenderness.   PAS on.                              11.1   9.42  )--------------( 264                          03-02-20 @ 07:17               33.8         143   |  105  |  20  -----------------------------<  115                  03-02-20 @ 07:17  3.5    |  34    |  1.29        Ca    9.4              A:   Pt is a 65y year old Male S/P I+D right total knee replacement, Post Op Day #3        Plan:    - Follow up with Medicine and ID   - continue ABx as advised   - possible PICC placement   - follow lab cultures   - OOB with PT/OT   - Pain control    - Incentive spirometry   - Discharge Planning when ok'd by Medicine and ID   - DVT ppx = PAS +  aspirin enteric coated 81 milliGRAM(s) Oral every 12 hours                                                                                                                                                                             Nasir TARANGO

## 2020-03-02 NOTE — PROGRESS NOTE ADULT - SUBJECTIVE AND OBJECTIVE BOX
Chief Complaint: TKR drainage    Interval Events: No events overnight. No complaints.    Review of Systems:  General: No fevers, chills, weight loss or gain  Skin: No rashes, color changes  Cardiovascular: No chest pain, orthopnea  Respiratory: No shortness of breath, cough  Gastrointestinal: No nausea, abdominal pain  Genitourinary: No incontinence, pain with urination  Musculoskeletal: No pain, swelling, decreased range of motion  Neurological: No headache, weakness  Psychiatric: No depression, anxiety  Endocrine: No weight loss or gain, increased thirst  All other systems are comprehensively negative.    Physical Exam:  Vitals:        Vital Signs Last 24 Hrs  T(C): 36.5 (02 Mar 2020 05:03), Max: 37 (01 Mar 2020 17:30)  T(F): 97.7 (02 Mar 2020 05:03), Max: 98.6 (01 Mar 2020 17:30)  HR: 87 (02 Mar 2020 05:03) (87 - 110)  BP: 134/79 (02 Mar 2020 05:03) (107/54 - 134/79)  BP(mean): --  RR: 18 (02 Mar 2020 05:03) (17 - 19)  SpO2: 97% (02 Mar 2020 05:03) (95% - 97%)  General: NAD  HEENT: MMM  Neck: No JVD, no carotid bruit  Lungs: CTAB  CV: RRR, nl S1/S2, no M/R/G  Abdomen: S/NT/ND, +BS  Extremities: No LE edema, no cyanosis  Neuro: AAOx3, non-focal  Skin: No rash    Labs:                        11.1   9.42  )-----------( 264      ( 02 Mar 2020 07:17 )             33.8     03-02    143  |  105  |  20  ----------------------------<  115<H>  3.5   |  34<H>  |  1.29    Ca    9.4      02 Mar 2020 07:17    TPro  5.9<L>  /  Alb  3.1<L>  /  TBili  1.0  /  DBili  x   /  AST  17  /  ALT  31  /  AlkPhos  70  03-01            Telemetry: Sinus rhythm, tachycardic at times

## 2020-03-03 ENCOUNTER — TRANSCRIPTION ENCOUNTER (OUTPATIENT)
Age: 65
End: 2020-03-03

## 2020-03-03 DIAGNOSIS — E87.6 HYPOKALEMIA: ICD-10-CM

## 2020-03-03 LAB
ANION GAP SERPL CALC-SCNC: 6 MMOL/L — SIGNIFICANT CHANGE UP (ref 5–17)
BUN SERPL-MCNC: 23 MG/DL — SIGNIFICANT CHANGE UP (ref 7–23)
CALCIUM SERPL-MCNC: 8.6 MG/DL — SIGNIFICANT CHANGE UP (ref 8.4–10.5)
CHLORIDE SERPL-SCNC: 103 MMOL/L — SIGNIFICANT CHANGE UP (ref 96–108)
CO2 SERPL-SCNC: 32 MMOL/L — HIGH (ref 22–31)
CREAT SERPL-MCNC: 1.04 MG/DL — SIGNIFICANT CHANGE UP (ref 0.5–1.3)
GLUCOSE SERPL-MCNC: 116 MG/DL — HIGH (ref 70–99)
HCT VFR BLD CALC: 33.7 % — LOW (ref 39–50)
HGB BLD-MCNC: 11 G/DL — LOW (ref 13–17)
MCHC RBC-ENTMCNC: 29.7 PG — SIGNIFICANT CHANGE UP (ref 27–34)
MCHC RBC-ENTMCNC: 32.6 GM/DL — SIGNIFICANT CHANGE UP (ref 32–36)
MCV RBC AUTO: 91.1 FL — SIGNIFICANT CHANGE UP (ref 80–100)
NRBC # BLD: 0 /100 WBCS — SIGNIFICANT CHANGE UP (ref 0–0)
PLATELET # BLD AUTO: 275 K/UL — SIGNIFICANT CHANGE UP (ref 150–400)
POTASSIUM SERPL-MCNC: 3.2 MMOL/L — LOW (ref 3.5–5.3)
POTASSIUM SERPL-SCNC: 3.2 MMOL/L — LOW (ref 3.5–5.3)
RBC # BLD: 3.7 M/UL — LOW (ref 4.2–5.8)
RBC # FLD: 13.6 % — SIGNIFICANT CHANGE UP (ref 10.3–14.5)
SODIUM SERPL-SCNC: 141 MMOL/L — SIGNIFICANT CHANGE UP (ref 135–145)
SURGICAL PATHOLOGY STUDY: SIGNIFICANT CHANGE UP
WBC # BLD: 8.37 K/UL — SIGNIFICANT CHANGE UP (ref 3.8–10.5)
WBC # FLD AUTO: 8.37 K/UL — SIGNIFICANT CHANGE UP (ref 3.8–10.5)

## 2020-03-03 PROCEDURE — 99232 SBSQ HOSP IP/OBS MODERATE 35: CPT

## 2020-03-03 RX ORDER — ACETAMINOPHEN 500 MG
2 TABLET ORAL
Qty: 0 | Refills: 0 | DISCHARGE
Start: 2020-03-03 | End: 2020-03-13

## 2020-03-03 RX ORDER — POTASSIUM CHLORIDE 20 MEQ
40 PACKET (EA) ORAL ONCE
Refills: 0 | Status: COMPLETED | OUTPATIENT
Start: 2020-03-03 | End: 2020-03-03

## 2020-03-03 RX ORDER — SENNA PLUS 8.6 MG/1
2 TABLET ORAL
Qty: 0 | Refills: 0 | DISCHARGE
Start: 2020-03-03

## 2020-03-03 RX ORDER — PANTOPRAZOLE SODIUM 20 MG/1
1 TABLET, DELAYED RELEASE ORAL
Qty: 30 | Refills: 1
Start: 2020-03-03 | End: 2020-05-01

## 2020-03-03 RX ORDER — POLYETHYLENE GLYCOL 3350 17 G/17G
17 POWDER, FOR SOLUTION ORAL
Qty: 0 | Refills: 0 | DISCHARGE
Start: 2020-03-03

## 2020-03-03 RX ORDER — ASPIRIN/CALCIUM CARB/MAGNESIUM 324 MG
1 TABLET ORAL
Qty: 76 | Refills: 0
Start: 2020-03-03 | End: 2020-04-09

## 2020-03-03 RX ADMIN — AMLODIPINE BESYLATE 10 MILLIGRAM(S): 2.5 TABLET ORAL at 05:38

## 2020-03-03 RX ADMIN — Medication 81 MILLIGRAM(S): at 17:38

## 2020-03-03 RX ADMIN — Medication 1000 MILLIGRAM(S): at 01:09

## 2020-03-03 RX ADMIN — Medication 25 MILLIGRAM(S): at 05:38

## 2020-03-03 RX ADMIN — Medication 1000 MILLIGRAM(S): at 17:38

## 2020-03-03 RX ADMIN — Medication 1000 MILLIGRAM(S): at 18:08

## 2020-03-03 RX ADMIN — Medication 1000 MILLIGRAM(S): at 09:59

## 2020-03-03 RX ADMIN — Medication 81 MILLIGRAM(S): at 05:38

## 2020-03-03 RX ADMIN — Medication 1000 MILLIGRAM(S): at 02:19

## 2020-03-03 RX ADMIN — ATORVASTATIN CALCIUM 40 MILLIGRAM(S): 80 TABLET, FILM COATED ORAL at 21:02

## 2020-03-03 RX ADMIN — PIPERACILLIN AND TAZOBACTAM 25 GRAM(S): 4; .5 INJECTION, POWDER, LYOPHILIZED, FOR SOLUTION INTRAVENOUS at 01:08

## 2020-03-03 RX ADMIN — PIPERACILLIN AND TAZOBACTAM 25 GRAM(S): 4; .5 INJECTION, POWDER, LYOPHILIZED, FOR SOLUTION INTRAVENOUS at 17:38

## 2020-03-03 RX ADMIN — PANTOPRAZOLE SODIUM 40 MILLIGRAM(S): 20 TABLET, DELAYED RELEASE ORAL at 05:38

## 2020-03-03 RX ADMIN — PIPERACILLIN AND TAZOBACTAM 25 GRAM(S): 4; .5 INJECTION, POWDER, LYOPHILIZED, FOR SOLUTION INTRAVENOUS at 08:54

## 2020-03-03 RX ADMIN — Medication 25 MILLIGRAM(S): at 17:39

## 2020-03-03 RX ADMIN — Medication 40 MILLIEQUIVALENT(S): at 08:54

## 2020-03-03 RX ADMIN — Medication 1000 MILLIGRAM(S): at 10:29

## 2020-03-03 NOTE — ED PROVIDER NOTE - CPE EDP EYES NORM
CLINICAL PHARMACY NOTE: MEDS TO [address] Select Patient?: No  Total # of Prescriptions Filled: 1   The following medications were delivered to the patient:  · Tylenol ES 500mg  Total # of Interventions Completed: 0  Time Spent (min): 15    Additional Documentation:  Delivered to Patient  Patient didn't want Ibuprofen    Kate Narayanan CPhT Head to toe assessment complete. Vital signs obtained. Pt resting in bed at this time. AM medication administered. Pt tolerated well. Pt denies pain. Pt denies further needs at this time. Call light in hand. Pt verbalizes correct use. Will continue to monitor.  Electronically signed by Aime Wheatley RN on 3/3/2020 at 9:04 AM Incentive Spirometry education and demonstration completed by Respiratory Therapy Yes      Response to education: Excellent     Teaching Time: 5 minutes    Minimum Predicted Vital Capacity - 593 mL. Patient's Actual Vital Capacity - 4000 mL. Turning over to Nursing for routine follow-up Yes.     Comments: Pt did great turing over to nursing    Electronically signed by Madelyn Hudson RCP on 3/2/2020 at 11:41 PM Patient received to PACU in stable condition. HR 37 on arrival. Medicated with robinul per Dr. Enrrique Pavon orders. Denies pain. Incision to abdomin clean dry and intact. Ice applied. Will continue to monitor. Pt HR better under control. BP improved. Patient meets discharge criteria for phase 1. Seen by anesthesia. OK to transfer to floor. and take you home after your surgery. You will not be allowed to leave alone or drive yourself home. It is strongly suggested someone stay with you the first 24 hrs. Your surgery will be cancelled if you do not have a ride home. 8. A parent/legal guardian must accompany a child scheduled for surgery and plan to stay at the hospital until the child is discharged. Please do not bring other children with you. 9. Please wear simple, loose fitting clothing to the hospital.  Dimitrios Dessert not bring valuables (money, credit cards, checkbooks, etc.) Do not wear any makeup (including no eye makeup) or nail polish on your fingers or toes. 10. DO NOT wear any jewelry or piercings on day of surgery. All body piercing jewelry must be removed. 11. If you have ___dentures, they will be removed before going to the OR; we will provide you a container. If you wear ___contact lenses or _x__glasses, they will be removed; please bring a case for them. 12. Please see your family doctor/pediatrician for a history & physical and/or concerning medications. Bring any test results/reports from your physician's office. PCP___wrenn_______________Phone___________H&P Appt. Date________             13 If you  have a Living Will and Durable Power of  for Healthcare, please bring in a copy. 15. Notify your Surgeon if you develop any illness between now and surgery  time, cough, cold, fever, sore throat, nausea, vomiting, etc.  Please notify your surgeon if you experience dizziness, shortness of breath or blurred vision between now & the time of your surgery             15. DO NOT shave your operative site 96 hours prior to surgery. For face & neck surgery, men may use an electric razor 48 hours prior to surgery. 16. Shower the night before or morning of surgery using an antibacterial soap or as you have been instructed.              17. To provide excellent care visitors will be normal...

## 2020-03-03 NOTE — PROGRESS NOTE ADULT - SUBJECTIVE AND OBJECTIVE BOX
ANDREY PALAFOX is a 65yMale , patient examined and chart reviewed.     INTERVAL HPI/ OVERNIGHT EVENTS:   Awake alert. No distress.  Afebrile. No events.    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 , Being monitored by ophthalmologist , eye injections every 10 weeks and Laser Rx  Bladder polyp: 2010 being monitored by urologist  Osteoarthritis of right knee  High cholesterol  HTN (hypertension)  S/P reconstruction procedure: left arm reconstruction surgery s/p laceration of left forearm with powersaw  2010  History of cholecystectomy: 2016  H/O hernia repair: bilateral inguinal hernia 2018      For details regarding the patient's social history, family history, and other miscellaneous elements, please refer the initial infectious diseases consultation and/or the admitting history and physical examination for this admission    ROS:  CONSTITUTIONAL:  Negative fever or chills, feels well, good appetite  EYES:  Negative  blurry vision or double vision  CARDIOVASCULAR:  Negative for chest pain or palpitations  RESPIRATORY:  Negative for cough, wheezing, or SOB   GASTROINTESTINAL:  Negative for nausea, vomiting, diarrhea, constipation, or abdominal pain  GENITOURINARY:  Negative frequency, urgency or dysuria  NEUROLOGIC:  No headache, confusion, dizziness, lightheadedness  All other systems were reviewed and are negative         Current inpatient medications :    ANTIBIOTICS/RELEVANT:  piperacillin/tazobactam IVPB.. 3.375 Gram(s) IV Intermittent every 8 hours    MEDICATIONS  (STANDING):  acetaminophen   Tablet .. 1000 milliGRAM(s) Oral every 8 hours  amLODIPine   Tablet 10 milliGRAM(s) Oral daily  aspirin enteric coated 81 milliGRAM(s) Oral every 12 hours  atorvastatin 40 milliGRAM(s) Oral at bedtime  metoprolol tartrate 25 milliGRAM(s) Oral two times a day  pantoprazole    Tablet 40 milliGRAM(s) Oral before breakfast  polyethylene glycol 3350 17 Gram(s) Oral daily  senna 2 Tablet(s) Oral at bedtime    MEDICATIONS  (PRN):  aluminum hydroxide/magnesium hydroxide/simethicone Suspension 30 milliLiter(s) Oral four times a day PRN Indigestion  bisacodyl Suppository 10 milliGRAM(s) Rectal daily PRN If no bowel movement by postoperative day #2  HYDROmorphone  Injectable 0.5 milliGRAM(s) IV Push every 3 hours PRN Severe Pain (7 - 10)  magnesium hydroxide Suspension 30 milliLiter(s) Oral daily PRN Constipation  ondansetron Injectable 4 milliGRAM(s) IV Push every 6 hours PRN Nausea and/or Vomiting  oxyCODONE    IR 5 milliGRAM(s) Oral every 3 hours PRN Mild Pain (1 - 3)  oxyCODONE    IR 10 milliGRAM(s) Oral every 3 hours PRN Moderate Pain (4 - 6)    Objective:  Vital Signs Last 24 Hrs  T(C): 36.7 (02 Mar 2020 14:29), Max: 37 (01 Mar 2020 17:30)  T(F): 98.1 (02 Mar 2020 14:29), Max: 98.6 (01 Mar 2020 17:30)  HR: 117 (02 Mar 2020 14:29) (87 - 117)  BP: 123/68 (02 Mar 2020 14:29) (120/73 - 134/79)  RR: 18 (02 Mar 2020 14:29) (17 - 19)  SpO2: 97% (02 Mar 2020 14:29) (95% - 97%)	    Physical Exam:  General: no acute distress  Eyes: sclera anicteric, pupils equal and reactive to light  ENMT: buccal mucosa moist, pharynx not injected  Neck: supple, trachea midline  Lungs: clear, no wheeze/rhonchi  Cardiovascular: regular rate and rhythm, S1 S2  Abdomen: soft, nontender, no organomegaly present, bowel sounds normal  Neurological: alert and oriented x3, Cranial Nerves II-XII grossly intact  Skin: no increased ecchymosis/petechiae/purpura  Lymph Nodes: no palpable cervical/supraclavicular lymph nodes enlargements  Extremities: Right knee drsg c/d/i + drain      LABS:                        11.0   8.37  )-----------( 275      ( 03 Mar 2020 07:12 )             33.7   03-03    141  |  103  |  23  ----------------------------<  116<H>  3.2<L>   |  32<H>  |  1.04    Ca    8.6      03 Mar 2020 07:12      MICROBIOLOGY:    Culture - Surgical Swab (collected 29 Feb 2020 07:51)  Source: .Surgical Swab C&amp;S RIGHT KNEE SURGICAL SWAB 1  Preliminary Report (01 Mar 2020 12:39):    No growth    Culture - Surgical Swab (collected 28 Feb 2020 21:19)  Source: .Surgical Swab C&amp;S RIGHT KNEE SURGICAL SWAB 2  Preliminary Report (29 Feb 2020 16:25):    No growth    Culture - Tissue with Gram Stain (collected 28 Feb 2020 20:59)  Source: .Tissue Other  Gram Stain (29 Feb 2020 02:02):    Rare polymorphonuclear leukocytes seen per low power field    No organisms seen per oil power field  Preliminary Report (29 Feb 2020 21:16):    No growth    RADIOLOGY & ADDITIONAL STUDIES:    EXAM:  KNEE AP  LAT & OBL RIGHT                          PROCEDURE DATE:  02/28/2020        INTERPRETATION:  XR KNEE AP AND LATERAL AND OBLIQUE RIGHT    CLINICAL INFORMATION:  intaop    AP, lateral intraoperative views of the right knee are submitted. Status post right knee arthroplasty with expected postsurgical changes    Assessment :  66yo M with pmh of htn, hld gerd, s/p RIGHT TKR Wyatt 15 2020 by Dr. 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 Per ortho - Sinus tract upper incision tracking into hole in medial retinaculum; fibrinous exudate, synovitis, yellow clear joint fluid +purulence and evidence or infection. Sp I&D washout and bearing exchange 2/29/2020..Outpt surface culture with Pseudomonas. OR cultures NGTD. Overall stable.    Plan :   Cont Zosyn x 4 weeks day 4/28 till 3/28/2020  OR cultures thus far NGTD  MIdline  Increase activity  Dc planning    D/w Dr Fernández        Continue with present regiment.  Appropriate use of antibiotics and adverse effects reviewed.      I have discussed the above plan of care with patient/ family in detail. They expressed understanding of the  treatment plan . Risks, benefits and alternatives discussed in detail. I have asked if they have any questions or concerns and appropriately addressed them to the best of my ability .    > 35 minutes were spent in direct patient care reviewing notes, medications ,labs data/ imaging , discussion with multidisciplinary team.    Thank you for allowing me to participate in care of your patient .    Candis Aguilar MD  Infectious Disease  [phone]

## 2020-03-03 NOTE — DISCHARGE NOTE PROVIDER - NSDCCPTREATMENT_GEN_ALL_CORE_FT
PRINCIPAL PROCEDURE  Procedure: Irrigation and debridement of knee with replacement of polyethylene liner  Findings and Treatment: infected right TKR

## 2020-03-03 NOTE — DISCHARGE NOTE PROVIDER - HOSPITAL COURSE
This is a 65 y.o. male who underwent Right TKR on 1/15/20 by Dr. Davion Fernández.  Patient noticed incisional d/c and had knee aspirated in Dr. Fernández's office on 2/24/20 and was started on oral abx. Cultures from aspirate grew out pseudomonas and patient was sent to ED on 2/28/20 for admission for I & D right knee which he underwent that day, along with poly exchange, by Dr. Fernández, without complication. Procedure was well tolerated.  No operative or mali-operative complications arose during patients hospital course.   Aspirin 81mg q 12h was given for DVT prophylaxis, in addition to the use of SCDs.  Anesthesia, Infectious Diseases, Cardiology, Medical Hospitalist, Physical Therapy and Occupational Therapy were consulted. Patient was treated with IV Vancomycin initially and was changed to Zosyn.  PICC line was place on 3/__/20.  Patient is in a knee immobilizer at all times and flexion is restricted to "0" degrees.  Patient is stable for discharge with a good prognosis.  Appropriate discharge instructions and medications are provided in this document. This is a 65 y.o. male who underwent Right TKR on 1/15/20 by Dr. Davion Fernández.  Patient noticed incisional d/c and had knee aspirated in Dr. Fernández's office on 2/24/20 and was started on oral abx. Cultures from aspirate grew out pseudomonas and patient was sent to ED on 2/28/20 for admission for I & D right knee which he underwent that day, along with poly exchange, by Dr. Fernández, without complication. Procedure was well tolerated.  No operative or mali-operative complications arose during patients hospital course.   Aspirin 81mg q 12h was given for DVT prophylaxis, in addition to the use of SCDs.  Anesthesia, Infectious Diseases, Cardiology, Medical Hospitalist, Physical Therapy and Occupational Therapy were consulted. Patient was treated with IV Vancomycin initially and was changed to Zosyn.  PICC line was place on 3/__/20.  Patient is in a knee immobilizer at all times and flexion is restricted to "0" degrees.  Patient is stable for discharge with a good prognosis.  Appropriate discharge instructions and medications are provided in this document.         Cont Zosyn 3.375gm IVPB every 8 h x 4 weeks till 3/28/2020

## 2020-03-03 NOTE — DISCHARGE NOTE PROVIDER - CARE PROVIDER_API CALL
Les Fernández)  Orthopaedic Sports Medicine; Orthopaedic Surgery  [address]  [place] [address]  Phone: [phone]  Fax: [phone]  Established Patient  Follow Up Time: 2 weeks

## 2020-03-03 NOTE — DISCHARGE NOTE PROVIDER - NSDCCPCAREPLAN_GEN_ALL_CORE_FT
PRINCIPAL DISCHARGE DIAGNOSIS  Diagnosis: Infection of total right knee replacement  Assessment and Plan of Treatment: Knee immobilizer at all times.  No flexion.  Ambulate, WBAT. Keep incision clean and dry. PRINCIPAL DISCHARGE DIAGNOSIS  Diagnosis: Infection of total right knee replacement  Assessment and Plan of Treatment: Knee immobilizer at all times.  No flexion.  Ambulate as tolerated, WBAT. May shower with Silverlon dressing, pat dry.  Remove dressing after 7 days.

## 2020-03-03 NOTE — PROGRESS NOTE ADULT - SUBJECTIVE AND OBJECTIVE BOX
ANDREY VENTURINO      65y Male s/p I & D and poly exchange right knee, no c/o pain                                                                                                                              POD #  4      STATUS POST:               Pre-Op Dx: Superficial postoperative wound infection    Post-Op Dx:  Infection of total right knee replacement    Procedure: Incision and drainage of knee with polyethylene liner exchange following total arthroplasty                                                  T(F): 97.5  HR: 93  BP: 125/73  RR: 18  SpO2: 96%                        11.0   8.37  )-----------( 275      ( 03 Mar 2020 07:12 )             33.7                     03-03    141  |  103  |  23  ----------------------------<  116<H>  3.2<L>   |  32<H>  |  1.04    Ca    8.6      03 Mar 2020 07:12        Physical Exam :    -   Prevena dressing in place C/D/I.   -   Distal Neurvascular status intact grossly.   -   Warm well perfused; capillary refill <3 seconds   -   (+)EHL/FHL 5/5  -   (+) Sensation to light touch  -   (-) Calf tenderness Bilaterally    A/P: 65y Male s/p s/p I & D and poly exchange right knee           -   Ortho Stable  -   Pain control   -   Cultures negative to date  -   Will proceed with PICC line and IV abx x 4 weeks per ID recommendations  -   DVT ppx:     [ x]SCDs     [x ] ASA     [ ] Eliquis     [ ] Lovenox  -   Weight bearing status:  WBAT [x ]        PWB    [ ]     TTWB  [ ]      NWB  [ ]   -  Dispo:     Home [x ]     Acute Rehab [ ]     ISSA [ ]     TBD [ ]

## 2020-03-03 NOTE — DISCHARGE NOTE PROVIDER - NSDCACTIVITY_GEN_ALL_CORE
Do not drive or operate machinery/No heavy lifting/straining/Stairs allowed/Walking - Outdoors allowed/Walking - Indoors allowed

## 2020-03-03 NOTE — PROGRESS NOTE ADULT - SUBJECTIVE AND OBJECTIVE BOX
Patient is a 65y old  Male who presents with a chief complaint of incisional drainage right knee (03 Mar 2020 08:33)        HPI:  66yo M with pmh of htn, hld gerd,  admitted s/p RIGHT TKR Wyatt 15, presented to be admitted to have right knee washed out in the OR. Had right knee replacement on Jan 15th by Dr. Fernández. Noticed drainage from surgical incision about 10 days ago. was seen by Dr. Fernández in the office, knee fluid aspirated and sent for cultures on 2/24/20 grew out few pseudomonas. Was started on Abx 1.5-2 weeks ago (cipro?). . No fevers, chills, or body aches. Was seen in office yesterday and told to come to ED today to have knee washed out. no current pain. last meal last evening 8:00 pm (hot dog and beans). took meds this am around 9-9:30. (28 Feb 2020 12:49)      SUBJECTIVE & OBJECTIVE: Pt seen and examined at bedside. no acute complaints     PHYSICAL EXAM:  T(C): 36.2 (03-03-20 @ 10:06), Max: 36.7 (03-02-20 @ 14:29)  HR: 96 (03-03-20 @ 10:06) (93 - 117)  BP: 119/75 (03-03-20 @ 10:06) (119/75 - 142/81)  RR: 19 (03-03-20 @ 10:06) (16 - 19)  SpO2: 97% (03-03-20 @ 10:06) (95% - 98%)  Wt(kg): --   GENERAL: NAD, well-groomed, well-developed  HEAD:  Atraumatic, Normocephalic  NERVOUS SYSTEM:  Alert & Oriented X3,   CHEST/LUNG: Clear to auscultation bilaterally; No rales, rhonchi, wheezing, or rubs  HEART: Regular rate and rhythm; No murmurs, rubs, or gallops  ABDOMEN: Soft, Nontender, Nondistended; Bowel sounds present  EXTREMITIES:  2+ Peripheral Pulses, No clubbing, cyanosis, or edema        MEDICATIONS  (STANDING):  acetaminophen   Tablet .. 1000 milliGRAM(s) Oral every 8 hours  amLODIPine   Tablet 10 milliGRAM(s) Oral daily  aspirin enteric coated 81 milliGRAM(s) Oral every 12 hours  atorvastatin 40 milliGRAM(s) Oral at bedtime  metoprolol tartrate 25 milliGRAM(s) Oral two times a day  pantoprazole    Tablet 40 milliGRAM(s) Oral before breakfast  piperacillin/tazobactam IVPB.. 3.375 Gram(s) IV Intermittent every 8 hours  polyethylene glycol 3350 17 Gram(s) Oral daily  senna 2 Tablet(s) Oral at bedtime    MEDICATIONS  (PRN):  aluminum hydroxide/magnesium hydroxide/simethicone Suspension 30 milliLiter(s) Oral four times a day PRN Indigestion  bisacodyl Suppository 10 milliGRAM(s) Rectal daily PRN If no bowel movement by postoperative day #2  HYDROmorphone  Injectable 0.5 milliGRAM(s) IV Push every 3 hours PRN Severe Pain (7 - 10)  magnesium hydroxide Suspension 30 milliLiter(s) Oral daily PRN Constipation  ondansetron Injectable 4 milliGRAM(s) IV Push every 6 hours PRN Nausea and/or Vomiting  oxyCODONE    IR 5 milliGRAM(s) Oral every 3 hours PRN Mild Pain (1 - 3)  oxyCODONE    IR 10 milliGRAM(s) Oral every 3 hours PRN Moderate Pain (4 - 6)      LABS:                        11.0   8.37  )-----------( 275      ( 03 Mar 2020 07:12 )             33.7     03-03    141  |  103  |  23  ----------------------------<  116<H>  3.2<L>   |  32<H>  |  1.04    Ca    8.6      03 Mar 2020 07:12            CAPILLARY BLOOD GLUCOSE          CAPILLARY BLOOD GLUCOSE        CAPILLARY BLOOD GLUCOSE                RECENT CULTURES:      RADIOLOGY & ADDITIONAL TESTS:                        DVT/GI ppx  Discussed with pt @ bedside

## 2020-03-03 NOTE — PROGRESS NOTE ADULT - SUBJECTIVE AND OBJECTIVE BOX
Chief Complaint: TKR drainage    Interval Events: No events overnight. No complaints.    Review of Systems:  General: No fevers, chills, weight loss or gain  Skin: No rashes, color changes  Cardiovascular: No chest pain, orthopnea  Respiratory: No shortness of breath, cough  Gastrointestinal: No nausea, abdominal pain  Genitourinary: No incontinence, pain with urination  Musculoskeletal: No pain, swelling, decreased range of motion  Neurological: No headache, weakness  Psychiatric: No depression, anxiety  Endocrine: No weight loss or gain, increased thirst  All other systems are comprehensively negative.    Physical Exam:  Vital Signs Last 24 Hrs  T(C): 36.2 (03 Mar 2020 10:06), Max: 36.7 (02 Mar 2020 14:29)  T(F): 97.2 (03 Mar 2020 10:06), Max: 98.1 (02 Mar 2020 14:29)  HR: 96 (03 Mar 2020 10:06) (93 - 117)  BP: 119/75 (03 Mar 2020 10:06) (119/75 - 142/81)  BP(mean): --  RR: 19 (03 Mar 2020 10:06) (16 - 19)  SpO2: 97% (03 Mar 2020 10:06) (95% - 98%)  General: NAD  HEENT: MMM  Neck: No JVD, no carotid bruit  Lungs: CTAB  CV: RRR, nl S1/S2, no M/R/G  Abdomen: S/NT/ND, +BS  Extremities: No LE edema, no cyanosis  Neuro: AAOx3, non-focal  Skin: No rash    Labs:             03-03    141  |  103  |  23  ----------------------------<  116<H>  3.2<L>   |  32<H>  |  1.04    Ca    8.6      03 Mar 2020 07:12                          11.0   8.37  )-----------( 275      ( 03 Mar 2020 07:12 )             33.7

## 2020-03-03 NOTE — PROGRESS NOTE ADULT - SUBJECTIVE AND OBJECTIVE BOX
Called to floor by nurse who reported that Prevena dressing was not working.  Patient reports that prior to yesterday, the suction device was "making noise intermittently." Since yesterday, the noise was constant.  His brother was visiting earlier and noticed a yellow light on the canister.  Upon examination, there was no suction to the dressing.  I attempted tegaderms over the existing dressing.  However, I was unable to achieve a seal.  I spoke with Dr. Fernández who instructed me to remove the Prevena and place a Silverlon dressing.      Prevena was removed, canister and dressing were clean and dry.  Incision is intact with scant sanguinous discharge.  Sutures in place.  Silverlon dressing was applied.  Patient was instructed to remove the dressing after 7 days.

## 2020-03-04 VITALS
DIASTOLIC BLOOD PRESSURE: 85 MMHG | SYSTOLIC BLOOD PRESSURE: 158 MMHG | HEART RATE: 105 BPM | TEMPERATURE: 98 F | RESPIRATION RATE: 18 BRPM

## 2020-03-04 LAB
ANION GAP SERPL CALC-SCNC: 6 MMOL/L — SIGNIFICANT CHANGE UP (ref 5–17)
BUN SERPL-MCNC: 16 MG/DL — SIGNIFICANT CHANGE UP (ref 7–23)
CALCIUM SERPL-MCNC: 9 MG/DL — SIGNIFICANT CHANGE UP (ref 8.4–10.5)
CHLORIDE SERPL-SCNC: 104 MMOL/L — SIGNIFICANT CHANGE UP (ref 96–108)
CO2 SERPL-SCNC: 32 MMOL/L — HIGH (ref 22–31)
CREAT SERPL-MCNC: 1.14 MG/DL — SIGNIFICANT CHANGE UP (ref 0.5–1.3)
CULTURE RESULTS: SIGNIFICANT CHANGE UP
GLUCOSE SERPL-MCNC: 112 MG/DL — HIGH (ref 70–99)
HCT VFR BLD CALC: 35.7 % — LOW (ref 39–50)
HGB BLD-MCNC: 11.9 G/DL — LOW (ref 13–17)
MCHC RBC-ENTMCNC: 30 PG — SIGNIFICANT CHANGE UP (ref 27–34)
MCHC RBC-ENTMCNC: 33.3 GM/DL — SIGNIFICANT CHANGE UP (ref 32–36)
MCV RBC AUTO: 89.9 FL — SIGNIFICANT CHANGE UP (ref 80–100)
NRBC # BLD: 0 /100 WBCS — SIGNIFICANT CHANGE UP (ref 0–0)
PLATELET # BLD AUTO: 331 K/UL — SIGNIFICANT CHANGE UP (ref 150–400)
POTASSIUM SERPL-MCNC: 3.7 MMOL/L — SIGNIFICANT CHANGE UP (ref 3.5–5.3)
POTASSIUM SERPL-SCNC: 3.7 MMOL/L — SIGNIFICANT CHANGE UP (ref 3.5–5.3)
RBC # BLD: 3.97 M/UL — LOW (ref 4.2–5.8)
RBC # FLD: 13.6 % — SIGNIFICANT CHANGE UP (ref 10.3–14.5)
SODIUM SERPL-SCNC: 142 MMOL/L — SIGNIFICANT CHANGE UP (ref 135–145)
SPECIMEN SOURCE: SIGNIFICANT CHANGE UP
WBC # BLD: 8.7 K/UL — SIGNIFICANT CHANGE UP (ref 3.8–10.5)
WBC # FLD AUTO: 8.7 K/UL — SIGNIFICANT CHANGE UP (ref 3.8–10.5)

## 2020-03-04 PROCEDURE — 85652 RBC SED RATE AUTOMATED: CPT

## 2020-03-04 PROCEDURE — 86901 BLOOD TYPING SEROLOGIC RH(D): CPT

## 2020-03-04 PROCEDURE — 99233 SBSQ HOSP IP/OBS HIGH 50: CPT

## 2020-03-04 PROCEDURE — 87070 CULTURE OTHR SPECIMN AEROBIC: CPT

## 2020-03-04 PROCEDURE — 99285 EMERGENCY DEPT VISIT HI MDM: CPT | Mod: 25

## 2020-03-04 PROCEDURE — 86803 HEPATITIS C AB TEST: CPT

## 2020-03-04 PROCEDURE — 76937 US GUIDE VASCULAR ACCESS: CPT

## 2020-03-04 PROCEDURE — 36573 INSJ PICC RS&I 5 YR+: CPT

## 2020-03-04 PROCEDURE — 97161 PT EVAL LOW COMPLEX 20 MIN: CPT

## 2020-03-04 PROCEDURE — 80048 BASIC METABOLIC PNL TOTAL CA: CPT

## 2020-03-04 PROCEDURE — 85610 PROTHROMBIN TIME: CPT

## 2020-03-04 PROCEDURE — C1776: CPT

## 2020-03-04 PROCEDURE — C1751: CPT

## 2020-03-04 PROCEDURE — 97116 GAIT TRAINING THERAPY: CPT

## 2020-03-04 PROCEDURE — C1713: CPT

## 2020-03-04 PROCEDURE — 93971 EXTREMITY STUDY: CPT

## 2020-03-04 PROCEDURE — 80202 ASSAY OF VANCOMYCIN: CPT

## 2020-03-04 PROCEDURE — 93005 ELECTROCARDIOGRAM TRACING: CPT

## 2020-03-04 PROCEDURE — 88300 SURGICAL PATH GROSS: CPT

## 2020-03-04 PROCEDURE — 85027 COMPLETE CBC AUTOMATED: CPT

## 2020-03-04 PROCEDURE — 83605 ASSAY OF LACTIC ACID: CPT

## 2020-03-04 PROCEDURE — 86850 RBC ANTIBODY SCREEN: CPT

## 2020-03-04 PROCEDURE — 97165 OT EVAL LOW COMPLEX 30 MIN: CPT

## 2020-03-04 PROCEDURE — 73562 X-RAY EXAM OF KNEE 3: CPT

## 2020-03-04 PROCEDURE — 97530 THERAPEUTIC ACTIVITIES: CPT

## 2020-03-04 PROCEDURE — 86140 C-REACTIVE PROTEIN: CPT

## 2020-03-04 PROCEDURE — 36573 INSJ PICC RS&I 5 YR+: CPT | Mod: 53

## 2020-03-04 PROCEDURE — 87075 CULTR BACTERIA EXCEPT BLOOD: CPT

## 2020-03-04 PROCEDURE — 85730 THROMBOPLASTIN TIME PARTIAL: CPT

## 2020-03-04 PROCEDURE — 80053 COMPREHEN METABOLIC PANEL: CPT

## 2020-03-04 PROCEDURE — 36415 COLL VENOUS BLD VENIPUNCTURE: CPT

## 2020-03-04 PROCEDURE — 86900 BLOOD TYPING SEROLOGIC ABO: CPT

## 2020-03-04 RX ORDER — AMLODIPINE BESYLATE 2.5 MG/1
1 TABLET ORAL
Qty: 30 | Refills: 0
Start: 2020-03-04 | End: 2020-04-02

## 2020-03-04 RX ORDER — METOPROLOL TARTRATE 50 MG
1 TABLET ORAL
Qty: 30 | Refills: 0
Start: 2020-03-04 | End: 2020-04-02

## 2020-03-04 RX ORDER — OXYCODONE HYDROCHLORIDE 5 MG/1
1 TABLET ORAL
Qty: 15 | Refills: 0
Start: 2020-03-04 | End: 2020-03-10

## 2020-03-04 RX ORDER — AMLODIPINE BESYLATE 2.5 MG/1
1 TABLET ORAL
Qty: 0 | Refills: 0 | DISCHARGE

## 2020-03-04 RX ORDER — LISINOPRIL 2.5 MG/1
0 TABLET ORAL
Qty: 0 | Refills: 0 | DISCHARGE

## 2020-03-04 RX ADMIN — AMLODIPINE BESYLATE 10 MILLIGRAM(S): 2.5 TABLET ORAL at 06:09

## 2020-03-04 RX ADMIN — PIPERACILLIN AND TAZOBACTAM 25 GRAM(S): 4; .5 INJECTION, POWDER, LYOPHILIZED, FOR SOLUTION INTRAVENOUS at 08:36

## 2020-03-04 RX ADMIN — PIPERACILLIN AND TAZOBACTAM 25 GRAM(S): 4; .5 INJECTION, POWDER, LYOPHILIZED, FOR SOLUTION INTRAVENOUS at 15:30

## 2020-03-04 RX ADMIN — Medication 1000 MILLIGRAM(S): at 16:48

## 2020-03-04 RX ADMIN — PIPERACILLIN AND TAZOBACTAM 25 GRAM(S): 4; .5 INJECTION, POWDER, LYOPHILIZED, FOR SOLUTION INTRAVENOUS at 01:58

## 2020-03-04 RX ADMIN — Medication 25 MILLIGRAM(S): at 06:08

## 2020-03-04 RX ADMIN — Medication 81 MILLIGRAM(S): at 06:08

## 2020-03-04 RX ADMIN — PANTOPRAZOLE SODIUM 40 MILLIGRAM(S): 20 TABLET, DELAYED RELEASE ORAL at 06:09

## 2020-03-04 RX ADMIN — Medication 1000 MILLIGRAM(S): at 01:57

## 2020-03-04 RX ADMIN — Medication 1000 MILLIGRAM(S): at 02:30

## 2020-03-04 RX ADMIN — Medication 25 MILLIGRAM(S): at 16:48

## 2020-03-04 RX ADMIN — Medication 1000 MILLIGRAM(S): at 11:35

## 2020-03-04 RX ADMIN — Medication 1000 MILLIGRAM(S): at 12:05

## 2020-03-04 RX ADMIN — Medication 81 MILLIGRAM(S): at 16:48

## 2020-03-04 NOTE — PROGRESS NOTE ADULT - PROVIDER SPECIALTY LIST ADULT
Cardiology
Hospitalist
Infectious Disease
Orthopedics
Infectious Disease

## 2020-03-04 NOTE — PROGRESS NOTE ADULT - SUBJECTIVE AND OBJECTIVE BOX
Patient is a 65y old  Male who presents with a chief complaint of incisional drainage right knee (03 Mar 2020 20:29)      INTERVAL HPI/OVERNIGHT EVENTS:  Pt is seen and examined.  feels better.  for midline today.  Pain Location & Control:     MEDICATIONS  (STANDING):  acetaminophen   Tablet .. 1000 milliGRAM(s) Oral every 8 hours  amLODIPine   Tablet 10 milliGRAM(s) Oral daily  aspirin enteric coated 81 milliGRAM(s) Oral every 12 hours  atorvastatin 40 milliGRAM(s) Oral at bedtime  metoprolol tartrate 25 milliGRAM(s) Oral two times a day  pantoprazole    Tablet 40 milliGRAM(s) Oral before breakfast  piperacillin/tazobactam IVPB.. 3.375 Gram(s) IV Intermittent every 8 hours  polyethylene glycol 3350 17 Gram(s) Oral daily  senna 2 Tablet(s) Oral at bedtime    MEDICATIONS  (PRN):  aluminum hydroxide/magnesium hydroxide/simethicone Suspension 30 milliLiter(s) Oral four times a day PRN Indigestion  bisacodyl Suppository 10 milliGRAM(s) Rectal daily PRN If no bowel movement by postoperative day #2  HYDROmorphone  Injectable 0.5 milliGRAM(s) IV Push every 3 hours PRN Severe Pain (7 - 10)  magnesium hydroxide Suspension 30 milliLiter(s) Oral daily PRN Constipation  ondansetron Injectable 4 milliGRAM(s) IV Push every 6 hours PRN Nausea and/or Vomiting  oxyCODONE    IR 5 milliGRAM(s) Oral every 3 hours PRN Mild Pain (1 - 3)  oxyCODONE    IR 10 milliGRAM(s) Oral every 3 hours PRN Moderate Pain (4 - 6)      Allergies    No Known Allergies    Intolerances      Vital Signs Last 24 Hrs  T(C): 36.3 (04 Mar 2020 09:59), Max: 37 (03 Mar 2020 21:06)  T(F): 97.4 (04 Mar 2020 09:59), Max: 98.6 (03 Mar 2020 21:06)  HR: 83 (04 Mar 2020 09:59) (83 - 91)  BP: 152/77 (04 Mar 2020 09:59) (122/68 - 152/77)  BP(mean): 77 (03 Mar 2020 17:00) (77 - 77)  RR: 18 (04 Mar 2020 09:59) (16 - 18)  SpO2: 97% (04 Mar 2020 09:59) (94% - 98%)        LABS:                        11.9   8.70  )-----------( 331      ( 04 Mar 2020 06:38 )             35.7     04 Mar 2020 06:38    142    |  104    |  16     ----------------------------<  112    3.7     |  32     |  1.14     Ca    9.0        04 Mar 2020 06:38        Cultures  Culture Results:   No growth (02-29 @ 07:51)  Culture Results:   No growth (02-28 @ 21:19)  Culture Results:   No growth (02-28 @ 20:59)        Culture - Surgical Swab (collected 02-29-20 @ 07:51)  Source: .Surgical Swab C&amp;S RIGHT KNEE SURGICAL SWAB 1  Preliminary Report (03-01-20 @ 12:39):    No growth    Culture - Surgical Swab (collected 02-28-20 @ 21:19)  Source: .Surgical Swab C&amp;S RIGHT KNEE SURGICAL SWAB 2  Preliminary Report (02-29-20 @ 16:25):    No growth    Culture - Tissue with Gram Stain (collected 02-28-20 @ 20:59)  Source: .Tissue Other  Gram Stain (02-29-20 @ 02:02):    Rare polymorphonuclear leukocytes seen per low power field    No organisms seen per oil power field  Preliminary Report (02-29-20 @ 21:16):    No growth        RADIOLOGY & ADDITIONAL TESTS:    Imaging Personally Reviewed:  [ ] YES  [ ] NO    Consultant(s) Notes Reviewed:  [ ] YES  [ ] NO    Care Discussed with Consultants/Other Providers [x ] YES  [ ] NO

## 2020-03-04 NOTE — PROGRESS NOTE ADULT - PROBLEM SELECTOR PLAN 1
s/p right knee washed out in the OR  continue on zosyn  midd line pending, to complete iv abx, thus far all culture negative to date
s/p right knee washed out in the OR  continue vanco and zosyn  f/u cultures
s/p right knee washed out in the OR  continue vanco and zosyn  f/u cultures and final path  would likely require picc for long term abx
s/p right knee washed out in the OR  started on vnaco and zosyn   awaiting culture from OR,
L LE/weight-bearing as tolerated
s/p right knee washed out in the OR  continue on zosyn  midd line pending, to complete iv abx, thus far all culture negative to date  Cont Zosyn x 4 weeks day 4/28 till 3/28/2020 as per ID.

## 2020-03-04 NOTE — PROGRESS NOTE ADULT - PROBLEM SELECTOR PLAN 4
ACE
continue on lisinprol
statin
statin
- ECG with no evidence of ischemia or infarction  - Recent echo with normal LV systolic function, mild/mod AR  As per cardiology-    - Recent cath with minimal disease  - Continue amlodipine 10 mg daily  - Continue metoprolol tartrate 25 mg bid

## 2020-03-04 NOTE — PROGRESS NOTE ADULT - PROBLEM SELECTOR PROBLEM 4
HTN (hypertension)
High cholesterol
High cholesterol
History of hypertensive retinopathy
History of hypertensive retinopathy
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
## 2020-03-04 NOTE — PROGRESS NOTE ADULT - ASSESSMENT
64yo M with pmh of htn, hld gerd,  admitted s/p RIGHT TKR Wyatt 15, admittred for right knee wash out
66yo M with pmh of htn, hld gerd,  admitted s/p RIGHT TKR Wyatt 15, admittred for right knee wash out
The patient is a 65 year old male with a history of HTN, HL, right TKR who presents with drainage from the knee, for surgery.    2/29/20  No cardiac complaints  Just right knee cap pain    Plan:  - ECG with no evidence of ischemia or infarction  - Recent cath with minimal disease-see above report  - Tachycardia - known issue at baseline. Can consider adding a beta-blocker post-operatively for tachycardia and HTN.  - Continue amlodipine 10 mg daily  - Lisinopril stopped  - On IV antibiotics  - Pain control  - Post-op care
The patient is a 65 year old male with a history of HTN, HL, right TKR who presents with drainage from the knee, for surgery.    3/1/20  Patient sitting in chair  No cardiac complaints  Just right knee cap pain that is improving    Plan:  - ECG with no evidence of ischemia or infarction  - Recent cath with minimal disease-see above report  - Tachycardia - known issue at baseline. Can consider adding a beta-blocker post-operatively for tachycardia and HTN.  - Continue amlodipine 10 mg daily  - Lisinopril stopped  - On IV antibiotics  - Patient does not want to increase the Metoprolol  - Pain control  - Post-op care  - Discharge planning
The patient is a 65 year old male with a history of HTN, HL, right TKR who presents with drainage from the knee, for surgery.    Plan:  - ECG with no evidence of ischemia or infarction  - Recent echo with normal LV systolic function, mild/mod AR  - Recent cath with minimal disease  - Continue amlodipine 10 mg daily  - Continue metoprolol tartrate 25 mg bid  - On zosyn
The patient is a 65 year old male with a history of HTN, HL, right TKR who presents with drainage from the knee, for surgery.    Plan:  - ECG with no evidence of ischemia or infarction  - Recent echo with normal LV systolic function, mild/mod AR  - Recent cath with minimal disease  - Continue amlodipine 10 mg daily  - Continue metoprolol tartrate 25 mg bid  - On zosyn  - Awaiting PICC
The patient is a 65 year old male with a history of HTN, HL, right TKR who presents with drainage from the knee, for surgery.    Plan:  - ECG with no evidence of ischemia or infarction  - Recent echo with normal LV systolic function, mild/mod AR  - Recent cath with minimal disease  - Continue amlodipine 10 mg daily  - Increase metoprolol tartrate to 25 mg bid  - On zosyn
64yo M with pmh of htn, hld gerd,  admitted s/p RIGHT TKR Wyatt 15, admittred for right knee wash out

## 2020-03-04 NOTE — PROGRESS NOTE ADULT - SUBJECTIVE AND OBJECTIVE BOX
POD  #:  5  S/P I & D Right TKR, poly exchange                      SUBJECTIVE: Patient sitting up in chair with lower extremity elevated. Received PICC line and is awaiting discharge instructions.  Reported Pain Score = 1    OBJECTIVE:     Vital Signs Last 24 Hrs  T(C): 36.3 (04 Mar 2020 09:59), Max: 37 (03 Mar 2020 21:06)  T(F): 97.4 (04 Mar 2020 09:59), Max: 98.6 (03 Mar 2020 21:06)  HR: 83 (04 Mar 2020 09:59) (83 - 91)  BP: 152/77 (04 Mar 2020 09:59) (123/72 - 152/77)  BP(mean): 77 (03 Mar 2020 17:00) (77 - 77)  RR: 18 (04 Mar 2020 09:59) (16 - 18)  SpO2: 97% (04 Mar 2020 09:59) (94% - 98%)    Right Knee:         Knee immobilizer in place.  Silveron Dressing: clean/dry/intact    Bilateral LEs:         Sensation:  intact to light touch          Motor exam:  5/5 dorsiflexion/plantarflexion/EHL          2+ DP pulses          calf supple, NT      LABS:                        11.9   8.70  )-----------( 331      ( 04 Mar 2020 06:38 )             35.7     03-04    142  |  104  |  16  ----------------------------<  112<H>  3.7   |  32<H>  |  1.14    Ca    9.0      04 Mar 2020 06:38            MEDICATIONS:  Anticoagulation:  aspirin enteric coated 81 milliGRAM(s) Oral every 12 hours      Pain medications:   acetaminophen   Tablet .. 1000 milliGRAM(s) Oral every 8 hours  HYDROmorphone  Injectable 0.5 milliGRAM(s) IV Push every 3 hours PRN  oxyCODONE    IR 5 milliGRAM(s) Oral every 3 hours PRN  oxyCODONE    IR 10 milliGRAM(s) Oral every 3 hours PRN        A/P : Patient stable  s/p  I & D Right TKR, poly exchange   POD # 5  -    Pain control  -    Abx: IV Zosyn per ID  -    DVT ppx: Aspirin 81mg q 12 h  -    Weight bearing status: WBAT with knee in knee immobilizer. No flexion.   -    Discharge plan:  home today on IV abx

## 2020-03-04 NOTE — PROGRESS NOTE ADULT - PROBLEM SELECTOR PROBLEM 1
Knee effusion, right

## 2020-03-04 NOTE — PROCEDURE NOTE - NSPROCDETAILS_GEN_ALL_CORE
sterile dressing applied/location identified, draped/prepped, sterile technique used/sterile technique, catheter placed/ultrasound guidance

## 2020-03-04 NOTE — PROGRESS NOTE ADULT - PROBLEM SELECTOR PROBLEM 2
History of hypertensive retinopathy
JEANETTE (acute kidney injury)

## 2020-03-04 NOTE — PROGRESS NOTE ADULT - REASON FOR ADMISSION
incisional drainage right knee

## 2020-03-04 NOTE — PROGRESS NOTE ADULT - SUBJECTIVE AND OBJECTIVE BOX
Chief Complaint: TKR drainage    Interval Events: No events overnight. No complaints.    Review of Systems:  General: No fevers, chills, weight loss or gain  Skin: No rashes, color changes  Cardiovascular: No chest pain, orthopnea  Respiratory: No shortness of breath, cough  Gastrointestinal: No nausea, abdominal pain  Genitourinary: No incontinence, pain with urination  Musculoskeletal: No pain, swelling, decreased range of motion  Neurological: No headache, weakness  Psychiatric: No depression, anxiety  Endocrine: No weight loss or gain, increased thirst  All other systems are comprehensively negative.    Physical Exam:  Vital Signs Last 24 Hrs  T(C): 36.7 (04 Mar 2020 05:44), Max: 37 (03 Mar 2020 21:06)  T(F): 98.1 (04 Mar 2020 05:44), Max: 98.6 (03 Mar 2020 21:06)  HR: 86 (04 Mar 2020 05:44) (86 - 96)  BP: 136/79 (04 Mar 2020 05:44) (119/75 - 139/77)  BP(mean): 77 (03 Mar 2020 17:00) (77 - 77)  RR: 18 (04 Mar 2020 05:44) (16 - 19)  SpO2: 94% (04 Mar 2020 05:44) (94% - 98%)  General: NAD  HEENT: MMM  Neck: No JVD, no carotid bruit  Lungs: CTAB  CV: RRR, nl S1/S2, no M/R/G  Abdomen: S/NT/ND, +BS  Extremities: No LE edema, no cyanosis  Neuro: AAOx3, non-focal  Skin: No rash    Labs:             03-04    142  |  104  |  16  ----------------------------<  112<H>  3.7   |  32<H>  |  1.14    Ca    9.0      04 Mar 2020 06:38                          11.9   8.70  )-----------( 331      ( 04 Mar 2020 06:38 )             35.7

## 2020-03-04 NOTE — PROGRESS NOTE ADULT - PROBLEM SELECTOR PROBLEM 3
High cholesterol
History of hypertensive retinopathy
History of hypertensive retinopathy
Hypokalemia
Hypokalemia

## 2020-03-05 LAB
CULTURE RESULTS: SIGNIFICANT CHANGE UP
SPECIMEN SOURCE: SIGNIFICANT CHANGE UP

## 2020-03-10 ENCOUNTER — APPOINTMENT (OUTPATIENT)
Dept: ORTHOPEDIC SURGERY | Facility: CLINIC | Age: 65
End: 2020-03-10
Payer: OTHER MISCELLANEOUS

## 2020-03-10 ENCOUNTER — OUTPATIENT (OUTPATIENT)
Dept: OUTPATIENT SERVICES | Facility: HOSPITAL | Age: 65
LOS: 1 days | End: 2020-03-10
Payer: COMMERCIAL

## 2020-03-10 ENCOUNTER — APPOINTMENT (OUTPATIENT)
Dept: INTERNAL MEDICINE | Facility: CLINIC | Age: 65
End: 2020-03-10
Payer: OTHER MISCELLANEOUS

## 2020-03-10 VITALS
DIASTOLIC BLOOD PRESSURE: 70 MMHG | HEART RATE: 76 BPM | TEMPERATURE: 97.7 F | OXYGEN SATURATION: 97 % | BODY MASS INDEX: 34.07 KG/M2 | RESPIRATION RATE: 16 BRPM | SYSTOLIC BLOOD PRESSURE: 120 MMHG | HEIGHT: 69 IN | WEIGHT: 230 LBS

## 2020-03-10 DIAGNOSIS — Z96.651 PRESENCE OF RIGHT ARTIFICIAL KNEE JOINT: ICD-10-CM

## 2020-03-10 DIAGNOSIS — Z98.890 OTHER SPECIFIED POSTPROCEDURAL STATES: Chronic | ICD-10-CM

## 2020-03-10 DIAGNOSIS — Z90.49 ACQUIRED ABSENCE OF OTHER SPECIFIED PARTS OF DIGESTIVE TRACT: Chronic | ICD-10-CM

## 2020-03-10 PROCEDURE — 99496 TRANSJ CARE MGMT HIGH F2F 7D: CPT | Mod: 25

## 2020-03-10 PROCEDURE — G0444 DEPRESSION SCREEN ANNUAL: CPT

## 2020-03-10 PROCEDURE — 93971 EXTREMITY STUDY: CPT

## 2020-03-10 PROCEDURE — 99024 POSTOP FOLLOW-UP VISIT: CPT

## 2020-03-10 PROCEDURE — 93971 EXTREMITY STUDY: CPT | Mod: 26,RT

## 2020-03-10 RX ORDER — LISINOPRIL 20 MG/1
20 TABLET ORAL TWICE DAILY
Qty: 60 | Refills: 2 | Status: DISCONTINUED | COMMUNITY
Start: 2017-12-11 | End: 2020-03-10

## 2020-03-10 RX ORDER — OXYCODONE 5 MG/1
5 TABLET ORAL
Qty: 42 | Refills: 0 | Status: DISCONTINUED | COMMUNITY
Start: 2020-01-16 | End: 2020-03-10

## 2020-03-10 NOTE — ASSESSMENT
[FreeTextEntry1] : Physical exam she is a well-developed man in no acute distress blood pressure 120/70 height 5 feet 9 inches weight 230 pounds BMI 23.97 temperature 97.7°F orally heart rate is 76 respirations 16 HEENT was unremarkable chest was clear cardiovascular regular abdomen was soft and nontender extremities showed no clubbing cyanosis there was edema of both legs especially the right neurologic exam was nonfocal patient reports a venous Doppler done at the hospital today which was negative patient's saw the orthopedist today and will begin physical therapy in 10 days his blood Pressure is well controlled on his current regimen this will be followed closely. His medication list was reconciled and renewals given where appropriate he is up to date with his ophthalmologist and dermatologist he will continue antibiotics till the end of March intravenously via home administration there is no signs of current infection\par

## 2020-03-10 NOTE — DISCUSSION/SUMMARY
[de-identified] : The underlying pathophysiology was reviewed in great detail with the patient as well as the various treatment options, including ice, analgesics, NSAIDs, Physical therapy, steroid injections. \par \par A US Doppler exam of the right lower extremity was ordered to rule out DVT. \par \par FU after results are obtained.
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
## 2020-03-10 NOTE — END OF VISIT
[FreeTextEntry3] : All medical record entries made by the [facility] were at my, Dr. Les Fernández, direction and personally dictated by me on 03/10/2020. I have reviewed the chart and agree that the record accurately reflects my personal performance of the history, physical exam, assessment and plan. I have also personally directed, reviewed, and agreed with the chart.

## 2020-03-10 NOTE — PHYSICAL EXAM
[No Acute Distress] : no acute distress [Well Nourished] : well nourished [Well Developed] : well developed [Well-Appearing] : well-appearing [Normal Voice/Communication] : normal voice/communication [Normal Sclera/Conjunctiva] : normal sclera/conjunctiva [PERRL] : pupils equal round and reactive to light [EOMI] : extraocular movements intact [Normal Outer Ear/Nose] : the outer ears and nose were normal in appearance [Normal Oropharynx] : the oropharynx was normal [Normal TMs] : both tympanic membranes were normal [Normal Nasal Mucosa] : the nasal mucosa was normal [No JVD] : no jugular venous distention [No Lymphadenopathy] : no lymphadenopathy [Supple] : supple [Thyroid Normal, No Nodules] : the thyroid was normal and there were no nodules present [No Respiratory Distress] : no respiratory distress  [No Accessory Muscle Use] : no accessory muscle use [Clear to Auscultation] : lungs were clear to auscultation bilaterally [Normal Percussion] : the chest was normal to percussion [Normal Rate] : normal rate  [Regular Rhythm] : with a regular rhythm [Normal S1, S2] : normal S1 and S2 [No Murmur] : no murmur heard [No Carotid Bruits] : no carotid bruits [No Abdominal Bruit] : a ~M bruit was not heard ~T in the abdomen [No Varicosities] : no varicosities [Pedal Pulses Present] : the pedal pulses are present [No Palpable Aorta] : no palpable aorta [No Extremity Clubbing/Cyanosis] : no extremity clubbing/cyanosis [Declined Breast Exam] : declined breast exam  [Soft] : abdomen soft [Non Tender] : non-tender [Non-distended] : non-distended [No Masses] : no abdominal mass palpated [No HSM] : no HSM [Normal Bowel Sounds] : normal bowel sounds [No Hernias] : no hernias [Declined Rectal Exam] : declined rectal exam [Normal Supraclavicular Nodes] : no supraclavicular lymphadenopathy [Normal Axillary Nodes] : no axillary lymphadenopathy [Normal Posterior Cervical Nodes] : no posterior cervical lymphadenopathy [Normal Anterior Cervical Nodes] : no anterior cervical lymphadenopathy [Normal Inguinal Nodes] : no inguinal lymphadenopathy [Normal Femoral Nodes] : no femoral lymphadenopathy [No CVA Tenderness] : no CVA  tenderness [No Spinal Tenderness] : no spinal tenderness [Grossly Normal Strength/Tone] : grossly normal strength/tone [No Rash] : no rash [No Skin Lesions] : no skin lesions [Coordination Grossly Intact] : coordination grossly intact [No Focal Deficits] : no focal deficits [Normal Gait] : normal gait [Deep Tendon Reflexes (DTR)] : deep tendon reflexes were 2+ and symmetric [Speech Grossly Normal] : speech grossly normal [Memory Grossly Normal] : memory grossly normal [Normal Affect] : the affect was normal [Alert and Oriented x3] : oriented to person, place, and time [Normal Mood] : the mood was normal [Normal Insight/Judgement] : insight and judgment were intact [Kyphosis] : no kyphosis [Scoliosis] : no scoliosis [Acne] : no acne [de-identified] : right lower extremity [de-identified] : right knee

## 2020-03-10 NOTE — ADDENDUM
[FreeTextEntry1] : I, Ana Alejandre, acted solely as a scribe for Dr. Les Fernández on this date 03/10/2020.

## 2020-03-10 NOTE — REVIEW OF SYSTEMS
[Fatigue] : fatigue [Dyspnea on Exertion] : dyspnea on exertion [Nocturia] : nocturia [Impotence] : impotence [Poor Libido] : poor libido [Joint Pain] : joint pain [Joint Stiffness] : joint stiffness [Muscle Pain] : muscle pain [Anxiety] : anxiety [Fever] : no fever [Chills] : no chills [Hot Flashes] : no hot flashes [Night Sweats] : no night sweats [Recent Change In Weight] : ~T no recent weight change [Discharge] : no discharge [Pain] : no pain [Redness] : no redness [Dryness] : no dryness [Vision Problems] : no vision problems [Itching] : no itching [Earache] : no earache [Hearing Loss] : no hearing loss [Nosebleeds] : no nosebleeds [Postnasal Drip] : no postnasal drip [Nasal Discharge] : no nasal discharge [Sore Throat] : no sore throat [Hoarseness] : no hoarseness [Chest Pain] : no chest pain [Palpitations] : no palpitations [Claudication] : no  leg claudication [Lower Ext Edema] : no lower extremity edema [Orthopena] : no orthopnea [Shortness Of Breath] : no shortness of breath [Wheezing] : no wheezing [Cough] : no cough [Abdominal Pain] : no abdominal pain [Nausea] : no nausea [Constipation] : no constipation [Diarrhea] : no diarrhea [Vomiting] : no vomiting [Heartburn] : no heartburn [Melena] : no melena [Dysuria] : no dysuria [Incontinence] : no incontinence [Hesitancy] : no hesitancy [Hematuria] : no hematuria [Frequency] : no frequency [Muscle Weakness] : no muscle weakness [Back Pain] : no back pain [Joint Swelling] : no joint swelling [Mole Changes] : no mole changes [Nail Changes] : no nail changes [Hair Changes] : no hair changes [Skin Rash] : no skin rash [Headache] : no headache [Dizziness] : no dizziness [Fainting] : no fainting [Confusion] : no confusion [Unsteady Walk] : no ataxia [Memory Loss] : no memory loss [Suicidal] : not suicidal [Insomnia] : no insomnia [Depression] : no depression [Easy Bleeding] : no easy bleeding [Easy Bruising] : no easy bruising [Swollen Glands] : no swollen glands [FreeTextEntry9] : swollen knee /right

## 2020-03-10 NOTE — HISTORY OF PRESENT ILLNESS
[Post-hospitalization from ___ Hospital] : Post-hospitalization from [unfilled] Hospital [Discharged on ___] : discharged on [unfilled] [FreeTextEntry2] : 65-year-old man comes to the office for transitional care after a recent hospitalization at [facility] where he was admitted for intravenous antibiotics after having a drainage procedure through a previously total knee replacement on the right is on IV home antibiotics does not remember the names of the end of March. Recently complaining of some swelling of the right leg venous Doppler done this morning was reportedly negative he has had no temperatures chills sweats or myalgias he denies headaches sinus congestion sore throat cough wheezing pleurisy chest pain shortness of breath exertional dyspnea lightheadedness palpitations dizziness vertigo or syncope he notes that his medications have been adjusted while at [facility] he denies abdominal pain nausea vomiting diarrhea constipation bright red blood per rectum or black stools his pain in his knee it is 1-2/10 and is relieved by Tylenol he is set to resume physical therapy in a week he denies urinary symptoms dysuria or gross hematuria he has had no skin rashes

## 2020-03-10 NOTE — HEALTH RISK ASSESSMENT
[] : No [Yes] : Yes [No falls in past year] : Patient reported no falls in the past year [0] : 2) Feeling down, depressed, or hopeless: Not at all (0) [[id number]] : 0

## 2020-03-10 NOTE — PHYSICAL EXAM
[Normal RLE] : Right Lower Extremity: No scars, rashes, lesions, ulcers, skin intact [Normal Touch] : sensation intact for touch [Normal] : No swelling, no edema, normal pedal pulses and normal temperature [Poor Appearance] : well-appearing [Acute Distress] : not in acute distress [de-identified] : Right Lower Extremity  \par o Knee :\par ¦ Inspection/Palpation : no tenderness to palpation, swelling about the knee and lower extremity with 2-3+ edema of the leg, nontender in the calf, no deformity\par ¦ Range of Motion : unable to assess\par ¦ Strength : unable to assess\par ¦ Tests and Signs: Denisa’s Test (-) \par o Muscle Bulk : normal muscle bulk present\par o Skin : no erythema, no ecchymosis\par o Sensation : sensation to pin intact\par o Vascular Exam : 2-3+ edema, no cyanosis, dorsalis pedis artery pulse 2+, posterior tibial artery pulse 2+

## 2020-03-13 LAB
CULTURE RESULTS: SIGNIFICANT CHANGE UP
SPECIMEN SOURCE: SIGNIFICANT CHANGE UP

## 2020-03-19 ENCOUNTER — APPOINTMENT (OUTPATIENT)
Dept: ORTHOPEDIC SURGERY | Facility: CLINIC | Age: 65
End: 2020-03-19
Payer: OTHER MISCELLANEOUS

## 2020-03-19 DIAGNOSIS — T81.49XA INFECTION FOLLOWING A PROCEDURE, OTHER SURGICAL SITE, INITIAL ENCOUNTER: ICD-10-CM

## 2020-03-19 PROCEDURE — 99024 POSTOP FOLLOW-UP VISIT: CPT
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
## 2020-03-19 NOTE — PHYSICAL EXAM
[Normal RLE] : Right Lower Extremity: No scars, rashes, lesions, ulcers, skin intact [Normal Touch] : sensation intact for touch [Normal] : No swelling, no edema, normal pedal pulses and normal temperature [Poor Appearance] : well-appearing [Acute Distress] : not in acute distress [de-identified] : Right Lower Extremity  \par o Knee :\par ¦ Inspection/Palpation : no tenderness to palpation, swelling about the knee and lower extremity with 2-3+ edema of the leg, nontender in the calf, no deformity\par ¦ Range of Motion : unable to assess\par ¦ Strength : unable to assess\par ¦ Tests and Signs: Denisa’s Test (-) \par o Muscle Bulk : normal muscle bulk present\par o Skin : no erythema, no ecchymosis\par o Sensation : sensation to pin intact\par o Vascular Exam : 2-3+ edema, no cyanosis, dorsalis pedis artery pulse 2+, posterior tibial artery pulse 2+

## 2020-03-19 NOTE — DISCUSSION/SUMMARY
[de-identified] : The underlying pathophysiology was reviewed in great detail with the patient as well as the various treatment options, including ice, analgesics, NSAIDs, Physical therapy, steroid injections. \par \par A US Doppler exam of the right lower extremity was ordered to rule out DVT. \par \par FU after results are obtained.

## 2020-03-30 ENCOUNTER — NON-APPOINTMENT (OUTPATIENT)
Age: 65
End: 2020-03-30

## 2020-04-09 ENCOUNTER — APPOINTMENT (OUTPATIENT)
Dept: ORTHOPEDIC SURGERY | Facility: CLINIC | Age: 65
End: 2020-04-09
Payer: OTHER MISCELLANEOUS

## 2020-04-09 PROCEDURE — 99024 POSTOP FOLLOW-UP VISIT: CPT

## 2020-04-10 LAB
ALBUMIN SERPL ELPH-MCNC: 4.9 G/DL
ALP BLD-CCNC: 109 U/L
ALT SERPL-CCNC: 50 U/L
ANION GAP SERPL CALC-SCNC: 13 MMOL/L
AST SERPL-CCNC: 30 U/L
BASOPHILS # BLD AUTO: 0.07 K/UL
BASOPHILS NFR BLD AUTO: 1 %
BILIRUB SERPL-MCNC: 1.2 MG/DL
BUN SERPL-MCNC: 16 MG/DL
CALCIUM SERPL-MCNC: 10.3 MG/DL
CHLORIDE SERPL-SCNC: 102 MMOL/L
CO2 SERPL-SCNC: 29 MMOL/L
CREAT SERPL-MCNC: 0.97 MG/DL
CRP SERPL-MCNC: <0.1 MG/DL
EOSINOPHIL # BLD AUTO: 0.37 K/UL
EOSINOPHIL NFR BLD AUTO: 5.1 %
ERYTHROCYTE [SEDIMENTATION RATE] IN BLOOD BY WESTERGREN METHOD: 3 MM/HR
GLUCOSE SERPL-MCNC: 114 MG/DL
HCT VFR BLD CALC: 43.4 %
HGB BLD-MCNC: 14.2 G/DL
IMM GRANULOCYTES NFR BLD AUTO: 0.1 %
LYMPHOCYTES # BLD AUTO: 1.9 K/UL
LYMPHOCYTES NFR BLD AUTO: 26.3 %
MAN DIFF?: NORMAL
MCHC RBC-ENTMCNC: 28.9 PG
MCHC RBC-ENTMCNC: 32.7 GM/DL
MCV RBC AUTO: 88.2 FL
MONOCYTES # BLD AUTO: 0.57 K/UL
MONOCYTES NFR BLD AUTO: 7.9 %
NEUTROPHILS # BLD AUTO: 4.31 K/UL
NEUTROPHILS NFR BLD AUTO: 59.6 %
PLATELET # BLD AUTO: 360 K/UL
POTASSIUM SERPL-SCNC: 4.2 MMOL/L
PROT SERPL-MCNC: 6.9 G/DL
RBC # BLD: 4.92 M/UL
RBC # FLD: 13.7 %
SODIUM SERPL-SCNC: 144 MMOL/L
WBC # FLD AUTO: 7.23 K/UL

## 2020-04-16 ENCOUNTER — APPOINTMENT (OUTPATIENT)
Dept: ORTHOPEDIC SURGERY | Facility: CLINIC | Age: 65
End: 2020-04-16
Payer: OTHER MISCELLANEOUS

## 2020-04-16 PROCEDURE — 20610 DRAIN/INJ JOINT/BURSA W/O US: CPT | Mod: 58,RT

## 2020-04-16 PROCEDURE — 99024 POSTOP FOLLOW-UP VISIT: CPT

## 2020-04-16 NOTE — PROCEDURE
[de-identified] : At this point I recommended aspiration and under sterile precautions an aspiration of the knee was attempted but no fluid was able to be removed.\par \par
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
## 2020-05-01 ENCOUNTER — APPOINTMENT (OUTPATIENT)
Dept: INTERNAL MEDICINE | Facility: CLINIC | Age: 65
End: 2020-05-01
Payer: OTHER MISCELLANEOUS

## 2020-05-01 PROCEDURE — 99215 OFFICE O/P EST HI 40 MIN: CPT | Mod: 25,95

## 2020-05-01 PROCEDURE — G0444 DEPRESSION SCREEN ANNUAL: CPT | Mod: 59,95

## 2020-05-01 RX ORDER — CIPROFLOXACIN HYDROCHLORIDE 500 MG/1
500 TABLET, FILM COATED ORAL
Qty: 28 | Refills: 0 | Status: DISCONTINUED | COMMUNITY
Start: 2020-02-21 | End: 2020-05-01

## 2020-05-01 RX ORDER — CEFADROXIL 500 MG/1
500 CAPSULE ORAL
Qty: 20 | Refills: 0 | Status: DISCONTINUED | COMMUNITY
Start: 2020-02-18 | End: 2020-05-01

## 2020-05-01 RX ORDER — CELECOXIB 200 MG/1
200 CAPSULE ORAL
Qty: 60 | Refills: 0 | Status: DISCONTINUED | COMMUNITY
Start: 2020-01-16 | End: 2020-05-01

## 2020-05-01 NOTE — HEALTH RISK ASSESSMENT
[Yes] : Yes [No falls in past year] : Patient reported no falls in the past year [0] : 2) Feeling down, depressed, or hopeless: Not at all (0) [] : No [[id number]] : 0

## 2020-05-01 NOTE — ASSESSMENT
[FreeTextEntry1] : main issue currently is a persisting cough which has come on over the past several months and maybe a slight association some recent heartburn and I have restarted his pantoprazole 40 mg once a day none of his medications commonly cause a cough. The possibilities include bronchial congestion or mild asthma if he responds to the proton pump inhibitor we will continue with if not we will consider treatment with Singulair and a steroid inhaler.patient is not leaving his home due to COVID19. So a chest x-ray is not possible at this time the patient follows closely with his orthopedist who we just saw her on April 16 is up-to-date with his ophthalmologist and dermatologist and has colonoscopies on regular intervals was encouraged to get the new pneumonia shot Prevnar 13 and the new shingles vaccine this is gaining some weight being quarantined and is supervised to restrict portions carbohydrates and make good selections walking in his house and property is advised

## 2020-05-01 NOTE — REVIEW OF SYSTEMS
[Fatigue] : fatigue [Cough] : cough [Heartburn] : heartburn [Nocturia] : nocturia [Poor Libido] : poor libido [Impotence] : impotence [Muscle Pain] : muscle pain [Joint Stiffness] : joint stiffness [Anxiety] : anxiety [Chills] : no chills [Fever] : no fever [Hot Flashes] : no hot flashes [Night Sweats] : no night sweats [Recent Change In Weight] : ~T no recent weight change [Discharge] : no discharge [Pain] : no pain [Dryness] : no dryness [Redness] : no redness [Vision Problems] : no vision problems [Itching] : no itching [Earache] : no earache [Hearing Loss] : no hearing loss [Nosebleeds] : no nosebleeds [Postnasal Drip] : no postnasal drip [Nasal Discharge] : no nasal discharge [Sore Throat] : no sore throat [Hoarseness] : no hoarseness [Palpitations] : no palpitations [Chest Pain] : no chest pain [Claudication] : no  leg claudication [Lower Ext Edema] : no lower extremity edema [Shortness Of Breath] : no shortness of breath [Orthopena] : no orthopnea [Dyspnea on Exertion] : not dyspnea on exertion [Wheezing] : no wheezing [Abdominal Pain] : no abdominal pain [Nausea] : no nausea [Diarrhea] : no diarrhea [Constipation] : no constipation [Vomiting] : no vomiting [Melena] : no melena [Incontinence] : no incontinence [Dysuria] : no dysuria [Frequency] : no frequency [Hematuria] : no hematuria [Hesitancy] : no hesitancy [Joint Pain] : no joint pain [Muscle Weakness] : no muscle weakness [Joint Swelling] : no joint swelling [Back Pain] : no back pain [Itching] : no itching [Mole Changes] : no mole changes [Nail Changes] : no nail changes [Skin Rash] : no skin rash [Hair Changes] : no hair changes [Dizziness] : no dizziness [Headache] : no headache [Fainting] : no fainting [Confusion] : no confusion [Unsteady Walk] : no ataxia [Memory Loss] : no memory loss [Suicidal] : not suicidal [Insomnia] : no insomnia [Depression] : no depression [Easy Bruising] : no easy bruising [Easy Bleeding] : no easy bleeding [Swollen Glands] : no swollen glands

## 2020-05-01 NOTE — HISTORY OF PRESENT ILLNESS
[Home] : at home, [unfilled] , at the time of the visit. [Medical Office: ([facility])___] : at the medical office located in  [Patient] : the patient [Self] : self [FreeTextEntry1] : TELEHEALTH video follow up....to review his medications and discuss his overall health is an issue with a persisting cough [de-identified] : TELEHEALTH video visit... 65-year-old man with a history of hypertension bladder cancer erectile dysfunction GERD recent placement of his right knee with subsequent infection in the replacement requiring drainage and prolonged intravenous antibiotics he was noted since the end of January that he has been having a cough that comes and goes it might be more common when he lays flat however it can occur with him just sitting he does have occasional heartburn he denies postnasal drip sinus congestion or sore throat the cough is occasionally productive but mostly dry denies temperature chills sweats or myalgias no headaches sinus congestion sore throat wheezing pleurisy chest pain shortness of breath exertional dyspnea lightheadedness palpitations dizziness vertigo or syncope denies abdominal pain nausea vomiting diarrhea constipation bright red blood per rectum or black stools he does have occasional reflux but no dysphasia urinates occasionally at night but denies dysuria or gross hematuria he's had no leg edema and denies any recent skin rashes

## 2020-05-07 ENCOUNTER — APPOINTMENT (OUTPATIENT)
Dept: ORTHOPEDIC SURGERY | Facility: CLINIC | Age: 65
End: 2020-05-07
Payer: OTHER MISCELLANEOUS

## 2020-05-07 PROCEDURE — 99024 POSTOP FOLLOW-UP VISIT: CPT

## 2020-05-12 NOTE — PHYSICAL EXAM
[de-identified] : Right Lower Extremity\par o Knee :\par ¦ Inspection/Palpation : mild medial tenderness to palpation, mild diffuse swelling, no deformity\par ¦ Range of Motion : 0 - 120 degrees, no crepitus\par ¦ Stability : no valgus or varus instability present on provocative testing, Lachman’s Test (-)\par ¦ Strength : flexion and extension 5/5\par o Muscle Bulk : normal muscle bulk present\par o Skin : no erythema, no ecchymosis\par o Sensation : sensation to pin intact\par o Vascular Exam : no edema, no cyanosis, dorsalis pedis artery pulse 2+, posterior tibial artery pulse 2+\par \par Left Lower Extremity\par o Knee :\par ¦ Inspection/Palpation : no tenderness to palpation, no swelling, no deformity\par ¦ Range of Motion : 0 -120 degrees, no crepitus\par ¦ Stability : no valgus or varus instability present on provocative testing, Lachman’s Test (-)\par ¦ Strength : flexion and extension 5/5\par o Muscle Bulk : normal muscle bulk present\par o Skin : no erythema, no ecchymosis\par o Sensation : sensation to pin intact\par o Vascular Exam : no edema, no cyanosis, dorsalis pedis artery pulse 2+, posterior tibial artery pulse 2+

## 2020-05-12 NOTE — DISCUSSION/SUMMARY
[de-identified] : A home exercise sheet was given and discussed with the patient to follow.\par \par A letter was provided stating he may return to work 06/01/2020 light duty. He may not lift greater than 15 pounds. \par \par FU 6 weeks.
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
## 2020-06-04 ENCOUNTER — APPOINTMENT (OUTPATIENT)
Dept: ORTHOPEDIC SURGERY | Facility: CLINIC | Age: 65
End: 2020-06-04
Payer: MEDICARE

## 2020-06-04 VITALS — BODY MASS INDEX: 34.07 KG/M2 | HEIGHT: 69 IN | WEIGHT: 230 LBS

## 2020-06-04 PROCEDURE — 99213 OFFICE O/P EST LOW 20 MIN: CPT | Mod: 25

## 2020-06-04 PROCEDURE — 73564 X-RAY EXAM KNEE 4 OR MORE: CPT | Mod: LT

## 2020-06-04 PROCEDURE — 20610 DRAIN/INJ JOINT/BURSA W/O US: CPT | Mod: LT

## 2020-06-04 NOTE — DISCUSSION/SUMMARY
[de-identified] : The underlying pathophysiology was reviewed in great detail with the patient as well as the various treatment options, including ice, analgesics, NSAIDs, Physical therapy, steroid injections.\par \par The patient wishes to proceed with an aspiration and corticosteroid INJECTION of the left knee. \par \par An MRI was ordered for the left knee to rule out meniscus tear.\par \par FU once imaging is obtained. \par \par All questions were answered, all alternatives discussed and the patient is in complete agreement with that plan. Follow-up appointment as instructed. Any issues and the patient will call or come in sooner.\par

## 2020-06-04 NOTE — PHYSICAL EXAM
[de-identified] : Right Lower Extremity\par o Knee :\par ¦ Inspection/Palpation : mild medial tenderness to palpation, mild diffuse swelling, no deformity\par ¦ Range of Motion : 0 - 120 degrees, no crepitus\par ¦ Stability : no valgus or varus instability present on provocative testing, Lachman’s Test (-)\par ¦ Strength : flexion and extension 5/5\par o Muscle Bulk : normal muscle bulk present\par o Skin : no erythema, no ecchymosis\par o Sensation : sensation to pin intact\par o Vascular Exam : no edema, no cyanosis, dorsalis pedis artery pulse 2+, posterior tibial artery pulse 2+\par \par Left Lower Extremity\par o Knee :\par ¦ Inspection/Palpation : marked medial tenderness to palpation, 2+ effusion, no deformity\par ¦ Range of Motion : 0 -105 degrees, no crepitus\par ¦ Stability : no valgus or varus instability present on provocative testing, Lachman’s Test (-)\par ¦ Strength : flexion and extension 5/5\par o Muscle Bulk : normal muscle bulk present\par o Skin : no erythema, no ecchymosis\par o Sensation : sensation to pin intact\par o Vascular Exam : no edema, no cyanosis, dorsalis pedis artery pulse 2+, posterior tibial artery pulse 2+ [de-identified] : o Left Knee : AP, lateral, sunrise, and Lang views of the knee were obtained, there are no soft tissue abnormalities, no fractures, alignment is normal, mild medial compartment osteoarthritis, normal bone density, no bony lesions.\par \par \par

## 2020-06-04 NOTE — PROCEDURE
[de-identified] : At this point I recommended aspiration and therapeutic injection and under sterile precautions an injection of 2 cc 1% lidocaine with 0.5 cc of Kenalog and 0.5 cc of Dexamethasone- was placed into the joint of the Left knee without complication after 30 cc of clear synovial fluid was aspirated and after several minutes the patient felt significant relief.\par \par

## 2020-06-10 ENCOUNTER — OUTPATIENT (OUTPATIENT)
Dept: OUTPATIENT SERVICES | Facility: HOSPITAL | Age: 65
LOS: 1 days | End: 2020-06-10
Payer: COMMERCIAL

## 2020-06-10 ENCOUNTER — RESULT REVIEW (OUTPATIENT)
Age: 65
End: 2020-06-10

## 2020-06-10 ENCOUNTER — APPOINTMENT (OUTPATIENT)
Dept: MRI IMAGING | Facility: HOSPITAL | Age: 65
End: 2020-06-10
Payer: MEDICARE

## 2020-06-10 DIAGNOSIS — M25.562 PAIN IN LEFT KNEE: ICD-10-CM

## 2020-06-10 DIAGNOSIS — Z98.890 OTHER SPECIFIED POSTPROCEDURAL STATES: Chronic | ICD-10-CM

## 2020-06-10 DIAGNOSIS — Z90.49 ACQUIRED ABSENCE OF OTHER SPECIFIED PARTS OF DIGESTIVE TRACT: Chronic | ICD-10-CM

## 2020-06-10 PROCEDURE — 73721 MRI JNT OF LWR EXTRE W/O DYE: CPT | Mod: 26,LT

## 2020-06-10 PROCEDURE — 73721 MRI JNT OF LWR EXTRE W/O DYE: CPT

## 2020-06-18 ENCOUNTER — NON-APPOINTMENT (OUTPATIENT)
Age: 65
End: 2020-06-18

## 2020-06-18 ENCOUNTER — APPOINTMENT (OUTPATIENT)
Dept: ORTHOPEDIC SURGERY | Facility: CLINIC | Age: 65
End: 2020-06-18
Payer: OTHER MISCELLANEOUS

## 2020-06-18 ENCOUNTER — APPOINTMENT (OUTPATIENT)
Dept: CARDIOLOGY | Facility: CLINIC | Age: 65
End: 2020-06-18
Payer: MEDICARE

## 2020-06-18 VITALS
WEIGHT: 234 LBS | HEART RATE: 74 BPM | BODY MASS INDEX: 34.66 KG/M2 | DIASTOLIC BLOOD PRESSURE: 77 MMHG | RESPIRATION RATE: 16 BRPM | OXYGEN SATURATION: 98 % | HEIGHT: 69 IN | SYSTOLIC BLOOD PRESSURE: 134 MMHG

## 2020-06-18 VITALS — DIASTOLIC BLOOD PRESSURE: 80 MMHG | SYSTOLIC BLOOD PRESSURE: 134 MMHG | HEART RATE: 76 BPM

## 2020-06-18 VITALS — HEIGHT: 69 IN | WEIGHT: 230 LBS | BODY MASS INDEX: 34.07 KG/M2

## 2020-06-18 PROCEDURE — 93000 ELECTROCARDIOGRAM COMPLETE: CPT

## 2020-06-18 PROCEDURE — 99213 OFFICE O/P EST LOW 20 MIN: CPT

## 2020-06-18 PROCEDURE — 99214 OFFICE O/P EST MOD 30 MIN: CPT

## 2020-06-18 RX ORDER — AMLODIPINE BESYLATE 10 MG/1
10 TABLET ORAL
Qty: 30 | Refills: 5 | Status: DISCONTINUED | COMMUNITY
Start: 2019-01-11 | End: 2020-06-18

## 2020-06-18 NOTE — PHYSICAL EXAM
[de-identified] : Right Lower Extremity\par o Knee :\par ¦ Inspection/Palpation : mild medial tenderness to palpation, mild diffuse swelling, no deformity\par ¦ Range of Motion : 0 - 125 degrees, no crepitus\par ¦ Stability : no valgus or varus instability present on provocative testing, Lachman’s Test (-)\par ¦ Strength : flexion and extension 4/5, Adduction 4+/5\par o Muscle Bulk : normal muscle bulk present\par o Skin : no erythema, no ecchymosis\par o Sensation : sensation to pin intact\par o Vascular Exam : no edema, no cyanosis, dorsalis pedis artery pulse 2+, posterior tibial artery pulse 2+\par \par Left Lower Extremity\par o Knee :\par ¦ Inspection/Palpation : medial joint line tenderness to palpation, no swelling, no deformity\par ¦ Range of Motion : 0 -120 degrees, no crepitus\par ¦ Stability : no valgus or varus instability present on provocative testing, Lachman’s Test (-)\par ¦ Strength : flexion and extension 5/5\par o Muscle Bulk : normal muscle bulk present\par o Skin : no erythema, no ecchymosis\par o Sensation : sensation to pin intact\par o Vascular Exam : no edema, no cyanosis, dorsalis pedis artery pulse 2+, posterior tibial artery pulse 2+
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
## 2020-06-18 NOTE — ASSESSMENT
[FreeTextEntry1] : Impression:\par 1. Hypertension well controlled\par 2. History of dyslipidemia\par 3. Minimal coronary disease\par \par Plan:\par 1. Continue current medical regimen\par 2. Lipid profile

## 2020-06-18 NOTE — REASON FOR VISIT
[Coronary Artery Disease] : coronary artery disease [Follow-Up - Clinic] : a clinic follow-up of [Hyperlipidemia] : hyperlipidemia [Hypertension] : hypertension [FreeTextEntry1] : Patient returns for followup. Feeling well. Offers no complaints of chest discomfort shortness of breath palpitations dizziness or syncope.\par

## 2020-06-18 NOTE — PHYSICAL EXAM
[General Appearance - Well Developed] : well developed [Normal Appearance] : normal appearance [General Appearance - Well Nourished] : well nourished [Well Groomed] : well groomed [Normal Conjunctiva] : the conjunctiva exhibited no abnormalities [No Deformities] : no deformities [General Appearance - In No Acute Distress] : no acute distress [No Oral Pallor] : no oral pallor [Normal Oral Mucosa] : normal oral mucosa [Normal Oropharynx] : normal oropharynx [Respiration, Rhythm And Depth] : normal respiratory rhythm and effort [Normal Jugular Venous V Waves Present] : normal jugular venous V waves present [Abdomen Soft] : soft [Auscultation Breath Sounds / Voice Sounds] : lungs were clear to auscultation bilaterally [Abnormal Walk] : normal gait [Abdomen Tenderness] : non-tender [Nail Clubbing] : no clubbing of the fingernails [Skin Color & Pigmentation] : normal skin color and pigmentation [Cyanosis, Localized] : no localized cyanosis [] : no rash [Oriented To Time, Place, And Person] : oriented to person, place, and time [Normal] : normal [No Anxiety] : not feeling anxious [No Precordial Heave] : no precordial heave was noted [Normal Rate] : normal [Rhythm Regular] : regular [No Gallop] : no gallop heard [Normal S1] : normal S1 [Normal S2] : normal S2 [S3] : no S3 [S4] : no S4 [Click] : no click [Pericardial Rub] : no pericardial rub [I] : a grade 1 [Right Carotid Bruit] : no bruit heard over the right carotid [2+] : left 2+ [Left Carotid Bruit] : no bruit heard over the left carotid [No Pitting Edema] : no pitting edema present

## 2020-07-30 ENCOUNTER — APPOINTMENT (OUTPATIENT)
Dept: ORTHOPEDIC SURGERY | Facility: CLINIC | Age: 65
End: 2020-07-30
Payer: OTHER MISCELLANEOUS

## 2020-07-30 LAB
B PERT IGG+IGM PNL SER: ABNORMAL
COLOR FLD: NORMAL
EOSINOPHIL # FLD MANUAL: 0 %
FLUID INTAKE SUBSTANCE CLASS: NORMAL
LYMPHOCYTES # FLD MANUAL: 30 %
MESOTHL CELL NFR FLD: 0 %
MONOS+MACROS NFR FLD MANUAL: 49 %
NEUTS SEG # FLD MANUAL: 21 %
RBC # FLD MANUAL: ABNORMAL /UL
SYCRY CLARITY: ABNORMAL
SYCRY COLOR: ABNORMAL
SYCRY ID: NORMAL
SYCRY TUBE: NORMAL
TOTAL CELLS COUNTED FLD: 207 /UL
TUBE TYPE: NORMAL

## 2020-07-30 PROCEDURE — 20610 DRAIN/INJ JOINT/BURSA W/O US: CPT | Mod: RT

## 2020-07-30 PROCEDURE — 99213 OFFICE O/P EST LOW 20 MIN: CPT | Mod: 25
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
## 2020-07-30 NOTE — PHYSICAL EXAM
[de-identified] : Right Lower Extremity\par o Knee :\par ¦ Inspection/Palpation : moderate medial tenderness to palpation, mild diffuse   suprapatellar swelling, 2+ effusion, no deformity\par ¦ Range of Motion : 0 - 120 degrees, no crepitus\par ¦ Stability : no valgus or varus instability present on provocative testing, Lachman’s Test (-)\par ¦ Strength : flexion and extension 5/5, Adduction 4+/5\par o Muscle Bulk : normal muscle bulk present\par o Skin : no erythema, no ecchymosis\par o Sensation : sensation to pin intact\par o Vascular Exam : no edema, no cyanosis, dorsalis pedis artery pulse 2+, posterior tibial artery pulse 2+\par \par Left Lower Extremity\par o Knee :\par ¦ Inspection/Palpation : medial joint line tenderness to palpation, no swelling, no deformity\par ¦ Range of Motion : 0 -120 degrees, no crepitus\par ¦ Stability : no valgus or varus instability present on provocative testing, Lachman’s Test (-)\par ¦ Strength : flexion and extension 5/5\par o Muscle Bulk : normal muscle bulk present\par o Skin : no erythema, no ecchymosis\par o Sensation : sensation to pin intact\par o Vascular Exam : no edema, no cyanosis, dorsalis pedis artery pulse 2+, posterior tibial artery pulse 2+

## 2020-07-30 NOTE — DISCUSSION/SUMMARY
[de-identified] : A prescription for Physical Therapy was provided. A request was sent to workers compensation. \par \par Knee synovial fluid was sent to the lab for analysis. \par \par I have recommended utilizing a knee sleeve to provide added support and stability. \par \par Patient was cleared to return to work with accommodations limiting schedule to 4 hours per day. \par \par FU once laboratory results are obtained. \par \par All questions were answered, all alternatives discussed and the patient is in complete agreement with that plan. Follow-up appointment as instructed. Any issues and the patient will call or come in sooner.

## 2020-07-30 NOTE — PROCEDURE
[de-identified] : At this point I recommended aspiration and under sterile precautions an injection of 2 cc 1% lidocaine -was placed into the joint of the right knee without complication and blood tinged synovial fluid was aspirated: 45 cc total fluid removed.\par \par \par

## 2020-08-13 LAB — BACTERIA FLD CULT: NORMAL

## 2020-08-27 ENCOUNTER — APPOINTMENT (OUTPATIENT)
Dept: ORTHOPEDIC SURGERY | Facility: CLINIC | Age: 65
End: 2020-08-27
Payer: OTHER MISCELLANEOUS

## 2020-08-27 DIAGNOSIS — Z96.659 INFECTION AND INFLAMMATORY REACTION DUE TO OTHER INTERNAL JOINT PROSTHESIS, SUBSEQUENT ENCOUNTER: ICD-10-CM

## 2020-08-27 DIAGNOSIS — T84.59XD INFECTION AND INFLAMMATORY REACTION DUE TO OTHER INTERNAL JOINT PROSTHESIS, SUBSEQUENT ENCOUNTER: ICD-10-CM

## 2020-08-27 PROCEDURE — 99213 OFFICE O/P EST LOW 20 MIN: CPT

## 2020-08-27 PROCEDURE — 73560 X-RAY EXAM OF KNEE 1 OR 2: CPT | Mod: RT

## 2020-08-27 NOTE — HISTORY OF PRESENT ILLNESS
[de-identified] : 65 year old male presents for an evaluation of right knee pain, s/p Right total knee arthroplasty on 1/15/2020. Status post I&D on 02/28/2020. Patient previously noticed 2 small areas of serous drainage along the incision cite that started after driving and hitting his knee on the steering wheel. He was seen by Dr. Ellison, where his knee was cultured and he was placed on antibiotics. Initial swab culture came back positive for pseudomonas however all OR cultures were negative after 5 days. ESR, CRP, and WBC were all normal. The incision site has since healed and appears clean with no discharge or dehiscence.\par \par Since last visit:  The patient continued to note stiffness and swelling of the right knee. He reports increased swelling in the suprapatellar region that has greatly increased over the past three days. He otherwise reports minimal pain about his right knee at rest. Patient reports continued difficulty ascending stairs leading with the right leg. He has been attending physical therapy noting  improvements in strength and range of motion.  He has been working for the past week noting increased pain and swelling after his shift. \par He reports continued weakness and feels he would benefit from formal physical therapy with increased therapeutic exercise and supervision for proper form.  Aspiration of synovial fluid was sent to the lab for analysis last visit, and it was negative for pathogens or crystals. \par \par Tests or Referrals: An authorization was requested for Physcial therapy for the right knee. \par Work Status: Patient is 25% Disabled\par Prognosis: Patients prognosis is guarded

## 2020-08-27 NOTE — PHYSICAL EXAM
[de-identified] : Right Lower Extremity\par o Knee :\par ¦ Inspection/Palpation : moderate medial tenderness to palpation, mild diffuse   suprapatellar swelling, 2-3+ effusion, no deformity\par ¦ Range of Motion : 0 - 115 degrees, no crepitus\par ¦ Stability : no valgus or varus instability present on provocative testing, Lachman’s Test (-), Posterior drawer 1+\par ¦ Strength : flexion and extension 5/5, Adduction 4+/5\par o Muscle Bulk : normal muscle bulk present\par o Skin : no erythema, no ecchymosis\par o Sensation : sensation to pin intact\par o Vascular Exam : no edema, no cyanosis, dorsalis pedis artery pulse 2+, posterior tibial artery pulse 2+\par \par Left Lower Extremity\par o Knee :\par ¦ Inspection/Palpation : medial joint line tenderness to palpation, no swelling, no deformity\par ¦ Range of Motion : 0 -120 degrees, no crepitus\par ¦ Stability : no valgus or varus instability present on provocative testing, Lachman’s Test (-)\par ¦ Strength : flexion and extension 5/5\par o Muscle Bulk : normal muscle bulk present\par o Skin : no erythema, no ecchymosis\par o Sensation : sensation to pin intact\par o Vascular Exam : no edema, no cyanosis, dorsalis pedis artery pulse 2+, posterior tibial artery pulse 2+ [de-identified] : o Right Knee : AP and lateral views of the knee were obtained, there are no soft tissue abnormalities, no fractures, alignment is normal, normal appearing joint spaces, normal bone density, no bony lesions s/p total knee arthroplasty in good alignment and proper positioning, no signs of prothesis loosening. \par \par

## 2020-08-27 NOTE — DISCUSSION/SUMMARY
[de-identified] : Continue Physcial therapy. A prescription was provided last visit. \par \par I have recommended utilizing a knee sleeve to provide added support and stability. \par \par Patient was cleared to return to work with accommodations limiting schedule to 4 hours per day. \par \par A referral was made to Dr. Sierra for a second opinion due to continued swelling and weakness of knee s/p R TKR. \par \par FU 6 weeks. \par \par All questions were answered, all alternatives discussed and the patient is in complete agreement with that plan. Follow-up appointment as instructed. Any issues and the patient will call or come in sooner.

## 2020-08-28 NOTE — DISCHARGE NOTE PROVIDER - NSDCMRMEDTOKEN_GEN_ALL_CORE_FT
acetaminophen 500 mg oral tablet: 2 tab(s) orally every 8 hours for 2-3 weeks after surgery  acetaminophen 500 mg oral tablet: 2 tab(s) orally every 8 hours  amLODIPine 10 mg oral tablet: 1 tab(s) orally once a day  aspirin 81 mg oral delayed release tablet: 1 tab orally every 12 hours.  aspirin 81 mg oral delayed release tablet: 1 tab(s) orally every 12 hours. Take at least 2 hours before celebrex  atorvastatin 40 mg oral tablet: 1 tab(s) orally once a day  hydroCHLOROthiazide 12.5 mg oral capsule: 1 cap(s) orally once a day  lisinopril 20 mg oral tablet: orally 2 times a day  pantoprazole 40 mg oral delayed release tablet: 1 tab orally once a day (before a meal)  pantoprazole 40 mg oral delayed release tablet: 1 tab(s) orally once a day (before a meal)  polyethylene glycol 3350 oral powder for reconstitution: 17 gram(s) orally once a day  senna oral tablet: 2 tab(s) orally once a day (at bedtime) 144 acetaminophen 500 mg oral tablet: 2 tab(s) orally every 8 hours  amLODIPine 10 mg oral tablet: 1 tab(s) orally once a day  aspirin 81 mg oral delayed release tablet: 1 tab orally every 12 hours.  atorvastatin 40 mg oral tablet: 1 tab(s) orally once a day  oxyCODONE 5 mg oral tablet: 1 tab orally every 4 hours, As Needed Pain MDD:6   Ref#: [id number]  pantoprazole 40 mg oral delayed release tablet: 1 tab orally once a day (before a meal)  polyethylene glycol 3350 oral powder for reconstitution: 17 gram(s) orally once a day  senna oral tablet: 2 tab(s) orally once a day (at bedtime) acetaminophen 500 mg oral tablet: 2 tab(s) orally every 8 hours  amLODIPine 10 mg oral tablet: 1 tab(s) orally once a day  aspirin 81 mg oral delayed release tablet: 1 tab orally every 12 hours.  atorvastatin 40 mg oral tablet: 1 tab(s) orally once a day  metoprolol tartrate 25 mg oral tablet: 1 tab(s) orally once a day   oxyCODONE 5 mg oral tablet: 1 tab orally every 4 hours, As Needed Pain MDD:6   Ref#: [id number]  pantoprazole 40 mg oral delayed release tablet: 1 tab orally once a day (before a meal)  polyethylene glycol 3350 oral powder for reconstitution: 17 gram(s) orally once a day  senna oral tablet: 2 tab(s) orally once a day (at bedtime) acetaminophen 500 mg oral tablet: 2 tab(s) orally every 8 hours  amLODIPine 10 mg oral tablet: 1 tab(s) orally once a day  aspirin 81 mg oral delayed release tablet: 1 tab orally every 12 hours.  atorvastatin 40 mg oral tablet: 1 tab(s) orally once a day  metoprolol tartrate 25 mg oral tablet: 1 tab(s) orally once a day   oxyCODONE 5 mg oral tablet: 1 tab orally every 4 hours, As Needed Pain MDD:6   Ref#: [id number]  pantoprazole 40 mg oral delayed release tablet: 1 tab orally once a day (before a meal)  polyethylene glycol 3350 oral powder for reconstitution: 17 gram(s) orally once a day  senna oral tablet: 2 tab(s) orally once a day (at bedtime)

## 2020-09-04 ENCOUNTER — APPOINTMENT (OUTPATIENT)
Dept: ORTHOPEDIC SURGERY | Facility: CLINIC | Age: 65
End: 2020-09-04
Payer: OTHER MISCELLANEOUS

## 2020-09-04 VITALS
BODY MASS INDEX: 33.33 KG/M2 | WEIGHT: 225 LBS | HEIGHT: 69 IN | DIASTOLIC BLOOD PRESSURE: 78 MMHG | HEART RATE: 76 BPM | SYSTOLIC BLOOD PRESSURE: 156 MMHG

## 2020-09-04 VITALS — TEMPERATURE: 97.8 F

## 2020-09-04 VITALS — DIASTOLIC BLOOD PRESSURE: 84 MMHG | SYSTOLIC BLOOD PRESSURE: 148 MMHG

## 2020-09-04 PROCEDURE — 99215 OFFICE O/P EST HI 40 MIN: CPT

## 2020-09-04 PROCEDURE — 73562 X-RAY EXAM OF KNEE 3: CPT | Mod: RT

## 2020-09-10 ENCOUNTER — APPOINTMENT (OUTPATIENT)
Dept: ORTHOPEDIC SURGERY | Facility: CLINIC | Age: 65
End: 2020-09-10
Payer: MEDICARE

## 2020-09-10 ENCOUNTER — APPOINTMENT (OUTPATIENT)
Dept: ORTHOPEDIC SURGERY | Facility: CLINIC | Age: 65
End: 2020-09-10
Payer: OTHER MISCELLANEOUS

## 2020-09-10 PROCEDURE — 99213 OFFICE O/P EST LOW 20 MIN: CPT | Mod: 25

## 2020-09-10 PROCEDURE — 99213 OFFICE O/P EST LOW 20 MIN: CPT

## 2020-09-10 NOTE — PHYSICAL EXAM
[de-identified] : Right Lower Extremity\par o Knee :\par ¦ Inspection/Palpation : moderate medial tenderness to palpation, mild diffuse  suprapatellar swelling, 2-3+ effusion, no deformity\par ¦ Range of Motion : 0 - 110 degrees, no crepitus\par ¦ Stability : no valgus or varus instability present on provocative testing, Lachman’s Test (-), Posterior drawer (2+)\par ¦ Strength : flexion and extension 5/5, Adduction 4+/5\par o Muscle Bulk : normal muscle bulk present\par o Skin : no erythema, no ecchymosis\par o Sensation : sensation to pin intact\par o Vascular Exam : no edema, no cyanosis, dorsalis pedis artery pulse 2+, posterior tibial artery pulse 2+\par \par Left Lower Extremity\par o Knee :\par ¦ Inspection/Palpation : diffuse medial tenderness to palpation, trace effusion, no deformity\par ¦ Range of Motion : 0 -125 degrees, no crepitus\par ¦ Stability : no valgus or varus instability present on provocative testing, Lachman’s Test (-) Posterior Drawer (-)\par ¦ Strength : flexion and extension 5/5\par o Muscle Bulk : normal muscle bulk present\par o Skin : no erythema, no ecchymosis\par o Sensation : sensation to pin intact\par o Vascular Exam : no edema, no cyanosis, dorsalis pedis artery pulse 2+, posterior tibial artery pulse 2+\par o Special Tests: Ceary (+) [de-identified] : MRI of the left knee performed on 06/10/2020 at [facility]: impression: \par ¦ Mildly complex tear in the medial meniscus near the junction of the posterior horn and body. \par ¦ Bone marrow edema in the medial tibial plateau, may be related to meniscus tear. \par ¦ Small increased signal in the medial collateral ligament and edema around the medial collateral ligament, likely sprain injury. \par ¦ Heterogeneous increased signal in the ACL is nonspecific, question degeneration or sequela of prior sprain. \par ¦ Low-grade chondrosis in the patellofemoral compartment. \par ¦ Small knee joint effusion and small popliteal cyst. \par \par

## 2020-09-10 NOTE — HISTORY OF PRESENT ILLNESS
[de-identified] : 65 year old male presents for an evaluation of left knee pain. Patient reports the onset of left knee pain in June 2020 due to compensating for is right knee pain s/p Right total knee arthroplasty on 1/15/2020 and Status post I&D on 02/28/2020  Patient denies injury or trauma to the area.  On 06/04/2020 patient had an aspiration and corticosteroid injection noting relief in pain temporarily. Pain has since returned and the patient describes the pain as a dull aching, and occasionally sharp pain localized to the medial aspect of his that is intermittent in nature. His symptoms are exacerbated with bending, and increased intensity walking. Pain is alleviated with rest. Patient reports diffuse swelling of the left knee. Patient is taking NSAIDs for pain relief with moderate relief in symptoms. He has been completing a home exercise program at this time noting improvement sin strength and range of motion.  Patient denies any other complaints at this time. He presents today for repeat clinical evaluation and MRI review of the left knee.

## 2020-09-10 NOTE — DISCUSSION/SUMMARY
[de-identified] : The underlying pathophysiology was reviewed in great detail with the patient as well as the various treatment options, including ice, analgesics, NSAIDs, Physical therapy, steroid injections, hyaluronic gel injections,  brace, arthroscopic medial meniscectomy. \par \par MRI of the left knee was reviewed and discussed in great detail today.  \par \par Continue home exercise program. \par \par FU 6 weeks.\par \par All questions were answered, all alternatives discussed and the patient is in complete agreement with that plan. Follow-up appointment as instructed. Any issues and the patient will call or come in sooner.\par

## 2020-09-23 ENCOUNTER — TRANSCRIPTION ENCOUNTER (OUTPATIENT)
Age: 65
End: 2020-09-23

## 2020-10-05 ENCOUNTER — RX RENEWAL (OUTPATIENT)
Age: 65
End: 2020-10-05

## 2020-10-06 NOTE — DISCUSSION/SUMMARY
[de-identified] : Continue home exercise program. \par \par Patient is interested in pursuing  a TKR revision with Dr. Thomas. A request will be sent to workers compensation for approval. \par \par FU 6 weeks. \par \par All questions were answered, all alternatives discussed and the patient is in complete agreement with that plan. Follow-up appointment as instructed. Any issues and the patient will call or come in sooner.

## 2020-10-06 NOTE — PHYSICAL EXAM
[de-identified] : Right Lower Extremity\par o Knee :\par ¦ Inspection/Palpation : moderate medial tenderness to palpation, mild diffuse   suprapatellar swelling, 2-3+ effusion, no deformity\par ¦ Range of Motion : 0 - 110 degrees, no crepitus\par ¦ Stability : no valgus or varus instability present on provocative testing, Lachman’s Test (-), Posterior drawer (2+)\par ¦ Strength : flexion and extension 5/5, Adduction 4+/5\par o Muscle Bulk : normal muscle bulk present\par o Skin : no erythema, no ecchymosis\par o Sensation : sensation to pin intact\par o Vascular Exam : no edema, no cyanosis, dorsalis pedis artery pulse 2+, posterior tibial artery pulse 2+\par \par Left Lower Extremity\par o Knee :\par ¦ Inspection/Palpation : diffuse medial tenderness to palpation, trace effusion, no deformity\par ¦ Range of Motion : 0 -125 degrees, no crepitus\par ¦ Stability : no valgus or varus instability present on provocative testing, Lachman’s Test (-) Posterior Drawer (-)\par ¦ Strength : flexion and extension 5/5\par o Muscle Bulk : normal muscle bulk present\par o Skin : no erythema, no ecchymosis\par o Sensation : sensation to pin intact\par o Vascular Exam : no edema, no cyanosis, dorsalis pedis artery pulse 2+, posterior tibial artery pulse 2+\par o Special Tests: Ceary (+)

## 2020-10-06 NOTE — HISTORY OF PRESENT ILLNESS
[de-identified] : 65 year old male presents for an evaluation of right knee pain, s/p Right total knee arthroplasty on 1/15/2020. Status post I&D on 02/28/2020. Patient previously noticed 2 small areas of serous drainage along the incision cite that started after driving and hitting his knee on the steering wheel. He was seen by Dr. Ellison, where his knee was cultured and he was placed on antibiotics. Initial swab culture came back positive for pseudomonas however all OR cultures were negative after 5 days. ESR, CRP, and WBC were all normal. The incision site has since healed and appears clean with no discharge or dehiscence. \par \par Since last visit:  The patient continued to note stiffness and swelling of the right knee. He reports increased swelling in the suprapatellar region that has greatly increased over the past three days. He otherwise reports minimal pain about his right knee at rest. Patient reports continued difficulty ascending stairs leading with the right leg. He has been attending physical therapy noting  improvements in strength and range of motion.  He has been working for the past week noting increased pain and swelling after his shift. \par He reports continued weakness and feels he would benefit from formal physical therapy with increased therapeutic exercise and supervision for proper form.  Aspiration of synovial fluid was sent to the lab for analysis last visit, and it was negative for pathogens or crystals. At his last visit patient was referred to Dr. Thomas for a second opinion. Dr. Thomas believes he may have anterior posterior instability and medial lateral instability and thinks this may be 1 of the reasons for effusion. The reason is probably he has stretched out because of chronic recurrent effusion. A long discussion was had with the patient about revision of the right knee.Patient is interesting in proceeding with a revision TKR. He would like to proceed with surgical intervention in November 2020. He has stopped PT at this time and is continuing with a home exercise program. \par He reports worsening left knee pain due to compensations caused by the right knee. \par \par Tests or Referrals: Patient is interested in pursuing  a TKR revision with Dr. Thomas. A request will be sent to workers compensation for approval. \par Work Status: Patient is 25% Disabled\par Prognosis: Patients prognosis is guarded

## 2020-10-22 ENCOUNTER — APPOINTMENT (OUTPATIENT)
Dept: ORTHOPEDIC SURGERY | Facility: CLINIC | Age: 65
End: 2020-10-22
Payer: OTHER MISCELLANEOUS

## 2020-10-22 PROCEDURE — 99213 OFFICE O/P EST LOW 20 MIN: CPT

## 2020-10-22 NOTE — DISCUSSION/SUMMARY
[de-identified] : The underlying pathophysiology was reviewed in great detail with the patient as well as the various treatment options, including ice, analgesics, NSAIDs, Physical therapy, steroid injections, hyaluronic gel injections,  brace, arthroscopic medial meniscectomy. \par \par Activity modifications and restrictions were discussed.\par \par Continue home exercise program. \par \par FU 6 weeks.\par \par All questions were answered, all alternatives discussed and the patient is in complete agreement with that plan. Follow-up appointment as instructed. Any issues and the patient will call or come in sooner.\par

## 2020-10-22 NOTE — PHYSICAL EXAM
[de-identified] : Right Lower Extremity\par o Knee :\par ¦ Inspection/Palpation : moderate medial tenderness to palpation, mild diffuse  suprapatellar swelling, 2-3+ effusion, no deformity\par ¦ Range of Motion : 0 - 110 degrees, no crepitus\par ¦ Stability : no valgus or varus instability present on provocative testing, Lachman’s Test (-), Posterior drawer (2+)\par ¦ Strength : flexion and extension 5/5, Adduction 4+/5\par o Muscle Bulk : normal muscle bulk present\par o Skin : no erythema, no ecchymosis\par o Sensation : sensation to pin intact\par o Vascular Exam : no edema, no cyanosis, dorsalis pedis artery pulse 2+, posterior tibial artery pulse 2+\par \par Left Lower Extremity\par o Knee :\par ¦ Inspection/Palpation : diffuse medial tenderness to palpation, trace effusion, no deformity\par ¦ Range of Motion : 0 -125 degrees, no crepitus\par ¦ Stability : no valgus or varus instability present on provocative testing, Lachman’s Test (-) Posterior Drawer (-)\par ¦ Strength : flexion and extension 5/5\par o Muscle Bulk : normal muscle bulk present\par o Skin : no erythema, no ecchymosis\par o Sensation : sensation to pin intact\par o Vascular Exam : no edema, no cyanosis, dorsalis pedis artery pulse 2+, posterior tibial artery pulse 2+\par o Special Tests: Ceary (+) [de-identified] : MRI of the left knee performed on 06/10/2020 at [facility]: impression: \par ¦ Mildly complex tear in the medial meniscus near the junction of the posterior horn and body. \par ¦ Bone marrow edema in the medial tibial plateau, may be related to meniscus tear. \par ¦ Small increased signal in the medial collateral ligament and edema around the medial collateral ligament, likely sprain injury. \par ¦ Heterogeneous increased signal in the ACL is nonspecific, question degeneration or sequela of prior sprain. \par ¦ Low-grade chondrosis in the patellofemoral compartment. \par ¦ Small knee joint effusion and small popliteal cyst. \par \par

## 2020-11-14 ENCOUNTER — TRANSCRIPTION ENCOUNTER (OUTPATIENT)
Age: 65
End: 2020-11-14

## 2020-11-17 ENCOUNTER — APPOINTMENT (OUTPATIENT)
Dept: INTERNAL MEDICINE | Facility: CLINIC | Age: 65
End: 2020-11-17
Payer: MEDICARE

## 2020-11-17 VITALS
HEART RATE: 95 BPM | DIASTOLIC BLOOD PRESSURE: 80 MMHG | BODY MASS INDEX: 34.66 KG/M2 | HEIGHT: 69 IN | SYSTOLIC BLOOD PRESSURE: 142 MMHG | WEIGHT: 234 LBS | OXYGEN SATURATION: 97 % | TEMPERATURE: 97.5 F | RESPIRATION RATE: 16 BRPM

## 2020-11-17 DIAGNOSIS — S83.242A OTHER TEAR OF MEDIAL MENISCUS, CURRENT INJURY, LEFT KNEE, INITIAL ENCOUNTER: ICD-10-CM

## 2020-11-17 DIAGNOSIS — H34.232 RETINAL ARTERY BRANCH OCCLUSION, LEFT EYE: ICD-10-CM

## 2020-11-17 PROCEDURE — 99214 OFFICE O/P EST MOD 30 MIN: CPT

## 2020-11-17 PROCEDURE — 99215 OFFICE O/P EST HI 40 MIN: CPT

## 2020-11-17 RX ORDER — ERYTHROMYCIN 5 MG/G
5 OINTMENT OPHTHALMIC
Qty: 4 | Refills: 0 | Status: COMPLETED | COMMUNITY
Start: 2020-07-03

## 2020-11-17 NOTE — HISTORY OF PRESENT ILLNESS
[No Pertinent Cardiac History] : no history of aortic stenosis, atrial fibrillation, coronary artery disease, recent myocardial infarction, or implantable device/pacemaker [Aortic Stenosis] : no aortic stenosis [Atrial Fibrillation] : no atrial fibrillation [Coronary Artery Disease] : no coronary artery disease [Recent Myocardial Infarction] : no recent myocardial infarction [Implantable Device/Pacemaker] : no implantable device/pacemaker [No Pertinent Pulmonary History] : no history of asthma, COPD, sleep apnea, or smoking [Asthma] : no asthma [COPD] : no COPD [Sleep Apnea] : no sleep apnea [Family Member] : no family member with adverse anesthesia reaction/sudden death [Self] : no previous adverse anesthesia reaction [Chronic Anticoagulation] : no chronic anticoagulation [Chronic Kidney Disease] : no chronic kidney disease [Diabetes] : no diabetes [(Patient denies any chest pain, claudication, dyspnea on exertion, orthopnea, palpitations or syncope)] : Patient denies any chest pain, claudication, dyspnea on exertion, orthopnea, palpitations or syncope [FreeTextEntry1] : right knee revision [FreeTextEntry2] : 11/21/2020 [FreeTextEntry3] : Dr Thomas [FreeTextEntry4] : 65-year-old man comes to the office for medical clearance for planned right knee revision/total knee replacement patient's past history is significant for bladder cancer hypertension acute meniscal tear of the left knee gastroesophageal reflux disease moderate aortic insufficiency erectile dysfunction hyperlipidemia and previous retinal artery branch vessel occlusion patient denies temperature chills sweats or myalgias he has had no headache sinus congestion sore throat cough wheezing pleurisy chest pain shortness of breath exertional dyspnea lightheadedness palpitations dizziness vertigo or syncope denies abdominal pain nausea vomiting diarrhea constipation bright red blood per rectum or black stools his appetite has been good his weight has been stable he has occasional heartburn patient complains of acute pain in his left knee from a meniscal tear and chronic pain and instability of his right knee patient rarely gets up at night to urinate and denies dysuria or gross hematuria he has had no recent leg edema denies skin rashes and has been sleeping relatively well [FreeTextEntry7] : 12/2019 cardiac cath : no significant coronary disease                    echocardiogram-- 02/19 good LV fx  mild/moderate AI

## 2020-11-17 NOTE — REVIEW OF SYSTEMS
[Heartburn] : heartburn [Impotence] : impotence [Poor Libido] : poor libido [Joint Stiffness] : joint stiffness [Muscle Pain] : muscle pain [Anxiety] : anxiety [Fever] : no fever [Chills] : no chills [Fatigue] : no fatigue [Hot Flashes] : no hot flashes [Night Sweats] : no night sweats [Recent Change In Weight] : ~T no recent weight change [Discharge] : no discharge [Pain] : no pain [Redness] : no redness [Dryness] : no dryness [Vision Problems] : no vision problems [Itching] : no itching [Earache] : no earache [Hearing Loss] : no hearing loss [Nosebleeds] : no nosebleeds [Postnasal Drip] : no postnasal drip [Nasal Discharge] : no nasal discharge [Sore Throat] : no sore throat [Hoarseness] : no hoarseness [Chest Pain] : no chest pain [Palpitations] : no palpitations [Claudication] : no  leg claudication [Lower Ext Edema] : no lower extremity edema [Orthopena] : no orthopnea [Paroxysmal Nocturnal Dyspnea] : no paroxysmal nocturnal dyspnea [Shortness Of Breath] : no shortness of breath [Wheezing] : no wheezing [Cough] : no cough [Dyspnea on Exertion] : not dyspnea on exertion [Abdominal Pain] : no abdominal pain [Nausea] : no nausea [Constipation] : no constipation [Diarrhea] : no diarrhea [Vomiting] : no vomiting [Melena] : no melena [Dysuria] : no dysuria [Incontinence] : no incontinence [Hesitancy] : no hesitancy [Nocturia] : no nocturia [Hematuria] : no hematuria [Frequency] : no frequency [Joint Pain] : joint pain [Muscle Weakness] : no muscle weakness [Back Pain] : no back pain [Joint Swelling] : no joint swelling [Itching] : no itching [Mole Changes] : no mole changes [Nail Changes] : no nail changes [Hair Changes] : no hair changes [Skin Rash] : no skin rash [Headache] : no headache [Dizziness] : no dizziness [Fainting] : no fainting [Confusion] : no confusion [Unsteady Walk] : no ataxia [Memory Loss] : no memory loss [Suicidal] : not suicidal [Insomnia] : no insomnia [Depression] : no depression [Easy Bleeding] : no easy bleeding [Easy Bruising] : no easy bruising [Swollen Glands] : no swollen glands [FreeTextEntry9] : left and right knee

## 2020-11-17 NOTE — ASSESSMENT
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 patient has had aggressive cardiac work-up 1 year ago including catheterization and and an echocardiogram there was no coronary occlusive disease mild to moderate aortic insufficiency with good LV function ......he is cleared medically for planned total right knee revision [FreeTextEntry7] : discontinue aspirin

## 2020-11-17 NOTE — PHYSICAL EXAM
[No Acute Distress] : no acute distress [Well Nourished] : well nourished [Well Developed] : well developed [Well-Appearing] : well-appearing [Normal Voice/Communication] : normal voice/communication [Normal Sclera/Conjunctiva] : normal sclera/conjunctiva [PERRL] : pupils equal round and reactive to light [EOMI] : extraocular movements intact [Normal Outer Ear/Nose] : the outer ears and nose were normal in appearance [Normal Oropharynx] : the oropharynx was normal [Normal TMs] : both tympanic membranes were normal [Normal Nasal Mucosa] : the nasal mucosa was normal [No JVD] : no jugular venous distention [No Lymphadenopathy] : no lymphadenopathy [Supple] : supple [Thyroid Normal, No Nodules] : the thyroid was normal and there were no nodules present [No Respiratory Distress] : no respiratory distress  [No Accessory Muscle Use] : no accessory muscle use [Clear to Auscultation] : lungs were clear to auscultation bilaterally [Normal Percussion] : the chest was normal to percussion [Normal Rate] : normal rate  [Regular Rhythm] : with a regular rhythm [Normal S1, S2] : normal S1 and S2 [No Carotid Bruits] : no carotid bruits [No Abdominal Bruit] : a ~M bruit was not heard ~T in the abdomen [No Varicosities] : no varicosities [Pedal Pulses Present] : the pedal pulses are present [No Edema] : there was no peripheral edema [No Palpable Aorta] : no palpable aorta [No Extremity Clubbing/Cyanosis] : no extremity clubbing/cyanosis [Declined Breast Exam] : declined breast exam  [Soft] : abdomen soft [Non Tender] : non-tender [Non-distended] : non-distended [No Masses] : no abdominal mass palpated [No HSM] : no HSM [Normal Bowel Sounds] : normal bowel sounds [No Hernias] : no hernias [Declined Rectal Exam] : declined rectal exam [Normal Supraclavicular Nodes] : no supraclavicular lymphadenopathy [Normal Axillary Nodes] : no axillary lymphadenopathy [Normal Posterior Cervical Nodes] : no posterior cervical lymphadenopathy [Normal Anterior Cervical Nodes] : no anterior cervical lymphadenopathy [Normal Inguinal Nodes] : no inguinal lymphadenopathy [Normal Femoral Nodes] : no femoral lymphadenopathy [No CVA Tenderness] : no CVA  tenderness [No Spinal Tenderness] : no spinal tenderness [No Joint Swelling] : no joint swelling [Grossly Normal Strength/Tone] : grossly normal strength/tone [No Rash] : no rash [No Skin Lesions] : no skin lesions [Coordination Grossly Intact] : coordination grossly intact [No Focal Deficits] : no focal deficits [Normal Gait] : normal gait [Deep Tendon Reflexes (DTR)] : deep tendon reflexes were 2+ and symmetric [Speech Grossly Normal] : speech grossly normal [Memory Grossly Normal] : memory grossly normal [Normal Affect] : the affect was normal [Alert and Oriented x3] : oriented to person, place, and time [Normal Mood] : the mood was normal [Normal Insight/Judgement] : insight and judgment were intact [Kyphosis] : no kyphosis [Scoliosis] : no scoliosis [Acne] : no acne [de-identified] : 1/6 systolic

## 2020-11-18 ENCOUNTER — OUTPATIENT (OUTPATIENT)
Dept: OUTPATIENT SERVICES | Facility: HOSPITAL | Age: 65
LOS: 1 days | End: 2020-11-18
Payer: COMMERCIAL

## 2020-11-18 VITALS
DIASTOLIC BLOOD PRESSURE: 87 MMHG | SYSTOLIC BLOOD PRESSURE: 144 MMHG | WEIGHT: 233.03 LBS | HEIGHT: 69 IN | RESPIRATION RATE: 16 BRPM | OXYGEN SATURATION: 99 % | TEMPERATURE: 98 F | HEART RATE: 90 BPM

## 2020-11-18 DIAGNOSIS — Z90.49 ACQUIRED ABSENCE OF OTHER SPECIFIED PARTS OF DIGESTIVE TRACT: Chronic | ICD-10-CM

## 2020-11-18 DIAGNOSIS — T84.098A OTHER MECHANICAL COMPLICATION OF OTHER INTERNAL JOINT PROSTHESIS, INITIAL ENCOUNTER: ICD-10-CM

## 2020-11-18 DIAGNOSIS — Z29.9 ENCOUNTER FOR PROPHYLACTIC MEASURES, UNSPECIFIED: ICD-10-CM

## 2020-11-18 DIAGNOSIS — Z98.890 OTHER SPECIFIED POSTPROCEDURAL STATES: Chronic | ICD-10-CM

## 2020-11-18 DIAGNOSIS — Z01.818 ENCOUNTER FOR OTHER PREPROCEDURAL EXAMINATION: ICD-10-CM

## 2020-11-18 DIAGNOSIS — Z96.651 PRESENCE OF RIGHT ARTIFICIAL KNEE JOINT: Chronic | ICD-10-CM

## 2020-11-18 DIAGNOSIS — T84.093A OTHER MECHANICAL COMPLICATION OF INTERNAL LEFT KNEE PROSTHESIS, INITIAL ENCOUNTER: ICD-10-CM

## 2020-11-18 DIAGNOSIS — Z11.59 ENCOUNTER FOR SCREENING FOR OTHER VIRAL DISEASES: ICD-10-CM

## 2020-11-18 LAB
ANION GAP SERPL CALC-SCNC: 14 MMOL/L — SIGNIFICANT CHANGE UP (ref 5–17)
BLD GP AB SCN SERPL QL: NEGATIVE — SIGNIFICANT CHANGE UP
BUN SERPL-MCNC: 21 MG/DL — SIGNIFICANT CHANGE UP (ref 7–23)
CALCIUM SERPL-MCNC: 10 MG/DL — SIGNIFICANT CHANGE UP (ref 8.4–10.5)
CHLORIDE SERPL-SCNC: 100 MMOL/L — SIGNIFICANT CHANGE UP (ref 96–108)
CO2 SERPL-SCNC: 26 MMOL/L — SIGNIFICANT CHANGE UP (ref 22–31)
CREAT SERPL-MCNC: 1.01 MG/DL — SIGNIFICANT CHANGE UP (ref 0.5–1.3)
GLUCOSE SERPL-MCNC: 104 MG/DL — HIGH (ref 70–99)
HCT VFR BLD CALC: 45.7 % — SIGNIFICANT CHANGE UP (ref 39–50)
HGB BLD-MCNC: 16 G/DL — SIGNIFICANT CHANGE UP (ref 13–17)
MCHC RBC-ENTMCNC: 30.5 PG — SIGNIFICANT CHANGE UP (ref 27–34)
MCHC RBC-ENTMCNC: 35 GM/DL — SIGNIFICANT CHANGE UP (ref 32–36)
MCV RBC AUTO: 87.2 FL — SIGNIFICANT CHANGE UP (ref 80–100)
NRBC # BLD: 0 /100 WBCS — SIGNIFICANT CHANGE UP (ref 0–0)
PLATELET # BLD AUTO: 286 K/UL — SIGNIFICANT CHANGE UP (ref 150–400)
POTASSIUM SERPL-MCNC: 3.8 MMOL/L — SIGNIFICANT CHANGE UP (ref 3.5–5.3)
POTASSIUM SERPL-SCNC: 3.8 MMOL/L — SIGNIFICANT CHANGE UP (ref 3.5–5.3)
RBC # BLD: 5.24 M/UL — SIGNIFICANT CHANGE UP (ref 4.2–5.8)
RBC # FLD: 12.9 % — SIGNIFICANT CHANGE UP (ref 10.3–14.5)
RH IG SCN BLD-IMP: NEGATIVE — SIGNIFICANT CHANGE UP
SARS-COV-2 RNA SPEC QL NAA+PROBE: SIGNIFICANT CHANGE UP
SODIUM SERPL-SCNC: 140 MMOL/L — SIGNIFICANT CHANGE UP (ref 135–145)
WBC # BLD: 8.62 K/UL — SIGNIFICANT CHANGE UP (ref 3.8–10.5)
WBC # FLD AUTO: 8.62 K/UL — SIGNIFICANT CHANGE UP (ref 3.8–10.5)

## 2020-11-18 PROCEDURE — 86850 RBC ANTIBODY SCREEN: CPT

## 2020-11-18 PROCEDURE — 80048 BASIC METABOLIC PNL TOTAL CA: CPT

## 2020-11-18 PROCEDURE — U0003: CPT

## 2020-11-18 PROCEDURE — 87640 STAPH A DNA AMP PROBE: CPT

## 2020-11-18 PROCEDURE — 85027 COMPLETE CBC AUTOMATED: CPT

## 2020-11-18 PROCEDURE — 83036 HEMOGLOBIN GLYCOSYLATED A1C: CPT

## 2020-11-18 PROCEDURE — 86900 BLOOD TYPING SEROLOGIC ABO: CPT

## 2020-11-18 PROCEDURE — 87641 MR-STAPH DNA AMP PROBE: CPT

## 2020-11-18 PROCEDURE — 86901 BLOOD TYPING SEROLOGIC RH(D): CPT

## 2020-11-18 PROCEDURE — G0463: CPT

## 2020-11-18 RX ORDER — CEFAZOLIN SODIUM 1 G
2000 VIAL (EA) INJECTION ONCE
Refills: 0 | Status: COMPLETED | OUTPATIENT
Start: 2020-11-21 | End: 2020-11-21

## 2020-11-18 RX ORDER — CHLORHEXIDINE GLUCONATE 213 G/1000ML
1 SOLUTION TOPICAL ONCE
Refills: 0 | Status: DISCONTINUED | OUTPATIENT
Start: 2020-11-21 | End: 2020-11-22

## 2020-11-18 RX ORDER — SODIUM CHLORIDE 9 MG/ML
3 INJECTION INTRAMUSCULAR; INTRAVENOUS; SUBCUTANEOUS EVERY 8 HOURS
Refills: 0 | Status: DISCONTINUED | OUTPATIENT
Start: 2020-11-21 | End: 2020-11-22

## 2020-11-18 NOTE — H&P PST ADULT - HISTORY OF PRESENT ILLNESS
64 y/o male with h/o HTN, GERD OA of Right knee s/p Right TKR on 1/15/20, recovery complicated by infection s/p Right knee I&D, revision on 02/28/20 and aspiration and corticosteroid injection on 06/04/20 c/o Right knee pain, swelling and buckling. He is scheduled for Right Total Knee Revision. 66 y/o male with h/o HTN, GERD OA of Right knee s/p Right TKR on 1/15/20, recovery complicated by infection s/p Right knee I&D, revision on 02/28/20 and aspiration and corticosteroid injection on 06/04/20 c/o Right knee pain, swelling and buckling. He is scheduled for Right Total Knee Revision Arthroplasty.

## 2020-11-18 NOTE — H&P PST ADULT - NSICDXPROBLEM_GEN_ALL_CORE_FT
PROBLEM DIAGNOSES  Problem: Other mechanical complication of other internal joint prosthesis  Assessment and Plan: Right Total Knee Revision Arthroplasty.   Medical clearance on chart. Last cardiology note on chart.     Problem: Need for prophylactic measure  Assessment and Plan: The Caprini score indicates that this patient is at high risk for a VTE event (score 6 or greater). Surgical patients in this group will benefit from both pharmacologic prophylaxis and intermittent compression devices.  The surgical team will determine the balance between VTE risk and bleeding risk, and other clinical considerations

## 2020-11-18 NOTE — H&P PST ADULT - NSICDXPASTMEDICALHX_GEN_ALL_CORE_FT
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

## 2020-11-18 NOTE — H&P PST ADULT - NSICDXPASTSURGICALHX_GEN_ALL_CORE_FT
PAST SURGICAL HISTORY:  H/O hernia repair bilateral inguinal hernia 2018    H/O total knee replacement, right 01/15/20; I&D, revision with spacer exchange 02/28/20; aspiration 06/04/20    History of cholecystectomy 2016    S/P reconstruction procedure left arm reconstruction surgery s/p laceration of left forearm with powersaw  2010

## 2020-11-18 NOTE — H&P PST ADULT - RS GEN PE MLT RESP DETAILS PC
Called and talked to pt. He has been having frequent headaches and lightheadedness since being discharged from the hospital when he started xarelto and cardizem. He has also had paresthesia, which he describes as \"like when your leg falls asleep\" in his arms and legs last night. It made it difficult for him to sleep. He has never had this happen before. I reassured him that it may be all related to side effects of cardizem. Will stop for now and hold off on starting further antiarrhythmics for now. He will call in a few days if his symptoms persist off cardizem.  Instructed him to continue xarelto for 4 weeks post CV as discussed in the hospital    CHUN George-KYM airway patent/breath sounds equal/clear to auscultation bilaterally/good air movement

## 2020-11-18 NOTE — H&P PST ADULT - VISION (WITH CORRECTIVE LENSES IF THE PATIENT USUALLY WEARS THEM):
poor vision in left eye/Normal vision: sees adequately in most situations; can see medication labels, newsprint

## 2020-11-19 LAB
A1C WITH ESTIMATED AVERAGE GLUCOSE RESULT: 5.2 % — SIGNIFICANT CHANGE UP (ref 4–5.6)
ESTIMATED AVERAGE GLUCOSE: 103 MG/DL — SIGNIFICANT CHANGE UP (ref 68–114)
MRSA PCR RESULT.: SIGNIFICANT CHANGE UP
S AUREUS DNA NOSE QL NAA+PROBE: SIGNIFICANT CHANGE UP

## 2020-11-21 ENCOUNTER — APPOINTMENT (OUTPATIENT)
Dept: ORTHOPEDIC SURGERY | Facility: HOSPITAL | Age: 65
End: 2020-11-21

## 2020-11-21 ENCOUNTER — INPATIENT (INPATIENT)
Facility: HOSPITAL | Age: 65
LOS: 0 days | Discharge: HOME | DRG: 468 | End: 2020-11-22
Attending: ORTHOPAEDIC SURGERY | Admitting: ORTHOPAEDIC SURGERY
Payer: COMMERCIAL

## 2020-11-21 VITALS
DIASTOLIC BLOOD PRESSURE: 88 MMHG | SYSTOLIC BLOOD PRESSURE: 148 MMHG | RESPIRATION RATE: 18 BRPM | HEART RATE: 77 BPM | WEIGHT: 233.03 LBS | OXYGEN SATURATION: 98 % | HEIGHT: 69 IN | TEMPERATURE: 98 F

## 2020-11-21 DIAGNOSIS — Z90.49 ACQUIRED ABSENCE OF OTHER SPECIFIED PARTS OF DIGESTIVE TRACT: Chronic | ICD-10-CM

## 2020-11-21 DIAGNOSIS — Z96.651 PRESENCE OF RIGHT ARTIFICIAL KNEE JOINT: Chronic | ICD-10-CM

## 2020-11-21 DIAGNOSIS — T84.093A OTHER MECHANICAL COMPLICATION OF INTERNAL LEFT KNEE PROSTHESIS, INITIAL ENCOUNTER: ICD-10-CM

## 2020-11-21 DIAGNOSIS — Z98.890 OTHER SPECIFIED POSTPROCEDURAL STATES: Chronic | ICD-10-CM

## 2020-11-21 LAB
GLUCOSE BLDC GLUCOMTR-MCNC: 101 MG/DL — HIGH (ref 70–99)
RH IG SCN BLD-IMP: NEGATIVE — SIGNIFICANT CHANGE UP

## 2020-11-21 PROCEDURE — 27487 REVISE/REPLACE KNEE JOINT: CPT | Mod: RT

## 2020-11-21 PROCEDURE — 73560 X-RAY EXAM OF KNEE 1 OR 2: CPT | Mod: 26,RT

## 2020-11-21 RX ORDER — ACETAMINOPHEN 500 MG
1000 TABLET ORAL ONCE
Refills: 0 | Status: COMPLETED | OUTPATIENT
Start: 2020-11-21 | End: 2020-11-21

## 2020-11-21 RX ORDER — SENNA PLUS 8.6 MG/1
2 TABLET ORAL AT BEDTIME
Refills: 0 | Status: DISCONTINUED | OUTPATIENT
Start: 2020-11-21 | End: 2020-11-22

## 2020-11-21 RX ORDER — INFLUENZA VIRUS VACCINE 15; 15; 15; 15 UG/.5ML; UG/.5ML; UG/.5ML; UG/.5ML
0.5 SUSPENSION INTRAMUSCULAR ONCE
Refills: 0 | Status: DISCONTINUED | OUTPATIENT
Start: 2020-11-21 | End: 2020-11-22

## 2020-11-21 RX ORDER — ASPIRIN/CALCIUM CARB/MAGNESIUM 324 MG
325 TABLET ORAL
Refills: 0 | Status: DISCONTINUED | OUTPATIENT
Start: 2020-11-21 | End: 2020-11-22

## 2020-11-21 RX ORDER — SODIUM CHLORIDE 9 MG/ML
500 INJECTION INTRAMUSCULAR; INTRAVENOUS; SUBCUTANEOUS ONCE
Refills: 0 | Status: COMPLETED | OUTPATIENT
Start: 2020-11-21 | End: 2020-11-21

## 2020-11-21 RX ORDER — TRAMADOL HYDROCHLORIDE 50 MG/1
50 TABLET ORAL ONCE
Refills: 0 | Status: DISCONTINUED | OUTPATIENT
Start: 2020-11-21 | End: 2020-11-21

## 2020-11-21 RX ORDER — OXYCODONE HYDROCHLORIDE 5 MG/1
10 TABLET ORAL EVERY 4 HOURS
Refills: 0 | Status: DISCONTINUED | OUTPATIENT
Start: 2020-11-21 | End: 2020-11-22

## 2020-11-21 RX ORDER — CELECOXIB 200 MG/1
200 CAPSULE ORAL EVERY 12 HOURS
Refills: 0 | Status: DISCONTINUED | OUTPATIENT
Start: 2020-11-23 | End: 2020-11-22

## 2020-11-21 RX ORDER — ATORVASTATIN CALCIUM 80 MG/1
40 TABLET, FILM COATED ORAL AT BEDTIME
Refills: 0 | Status: DISCONTINUED | OUTPATIENT
Start: 2020-11-21 | End: 2020-11-22

## 2020-11-21 RX ORDER — ACETAMINOPHEN 500 MG
1000 TABLET ORAL ONCE
Refills: 0 | Status: COMPLETED | OUTPATIENT
Start: 2020-11-22 | End: 2020-11-22

## 2020-11-21 RX ORDER — SODIUM CHLORIDE 9 MG/ML
1000 INJECTION INTRAMUSCULAR; INTRAVENOUS; SUBCUTANEOUS
Refills: 0 | Status: DISCONTINUED | OUTPATIENT
Start: 2020-11-21 | End: 2020-11-22

## 2020-11-21 RX ORDER — PANTOPRAZOLE SODIUM 20 MG/1
40 TABLET, DELAYED RELEASE ORAL
Refills: 0 | Status: DISCONTINUED | OUTPATIENT
Start: 2020-11-21 | End: 2020-11-22

## 2020-11-21 RX ORDER — AMLODIPINE BESYLATE 2.5 MG/1
10 TABLET ORAL DAILY
Refills: 0 | Status: DISCONTINUED | OUTPATIENT
Start: 2020-11-21 | End: 2020-11-22

## 2020-11-21 RX ORDER — METOPROLOL TARTRATE 50 MG
25 TABLET ORAL DAILY
Refills: 0 | Status: DISCONTINUED | OUTPATIENT
Start: 2020-11-21 | End: 2020-11-21

## 2020-11-21 RX ORDER — ONDANSETRON 8 MG/1
4 TABLET, FILM COATED ORAL EVERY 6 HOURS
Refills: 0 | Status: DISCONTINUED | OUTPATIENT
Start: 2020-11-21 | End: 2020-11-22

## 2020-11-21 RX ORDER — AMLODIPINE BESYLATE 2.5 MG/1
10 TABLET ORAL DAILY
Refills: 0 | Status: DISCONTINUED | OUTPATIENT
Start: 2020-11-21 | End: 2020-11-21

## 2020-11-21 RX ORDER — POLYETHYLENE GLYCOL 3350 17 G/17G
17 POWDER, FOR SOLUTION ORAL DAILY
Refills: 0 | Status: DISCONTINUED | OUTPATIENT
Start: 2020-11-21 | End: 2020-11-22

## 2020-11-21 RX ORDER — LIDOCAINE HCL 20 MG/ML
0.2 VIAL (ML) INJECTION ONCE
Refills: 0 | Status: ACTIVE | OUTPATIENT
Start: 2020-11-21

## 2020-11-21 RX ORDER — SODIUM CHLORIDE 9 MG/ML
3 INJECTION INTRAMUSCULAR; INTRAVENOUS; SUBCUTANEOUS EVERY 8 HOURS
Refills: 0 | Status: DISCONTINUED | OUTPATIENT
Start: 2020-11-21 | End: 2020-11-22

## 2020-11-21 RX ORDER — MAGNESIUM HYDROXIDE 400 MG/1
30 TABLET, CHEWABLE ORAL DAILY
Refills: 0 | Status: DISCONTINUED | OUTPATIENT
Start: 2020-11-21 | End: 2020-11-22

## 2020-11-21 RX ORDER — ATORVASTATIN CALCIUM 80 MG/1
40 TABLET, FILM COATED ORAL AT BEDTIME
Refills: 0 | Status: DISCONTINUED | OUTPATIENT
Start: 2020-11-21 | End: 2020-11-21

## 2020-11-21 RX ORDER — KETOROLAC TROMETHAMINE 30 MG/ML
15 SYRINGE (ML) INJECTION EVERY 6 HOURS
Refills: 0 | Status: DISCONTINUED | OUTPATIENT
Start: 2020-11-21 | End: 2020-11-22

## 2020-11-21 RX ORDER — ACETAMINOPHEN 500 MG
975 TABLET ORAL EVERY 8 HOURS
Refills: 0 | Status: DISCONTINUED | OUTPATIENT
Start: 2020-11-22 | End: 2020-11-22

## 2020-11-21 RX ORDER — OXYCODONE HYDROCHLORIDE 5 MG/1
5 TABLET ORAL EVERY 4 HOURS
Refills: 0 | Status: DISCONTINUED | OUTPATIENT
Start: 2020-11-21 | End: 2020-11-22

## 2020-11-21 RX ORDER — SODIUM CHLORIDE 9 MG/ML
500 INJECTION INTRAMUSCULAR; INTRAVENOUS; SUBCUTANEOUS ONCE
Refills: 0 | Status: COMPLETED | OUTPATIENT
Start: 2020-11-22 | End: 2020-11-22

## 2020-11-21 RX ORDER — PANTOPRAZOLE SODIUM 20 MG/1
40 TABLET, DELAYED RELEASE ORAL ONCE
Refills: 0 | Status: COMPLETED | OUTPATIENT
Start: 2020-11-21 | End: 2020-11-21

## 2020-11-21 RX ORDER — CEFAZOLIN SODIUM 1 G
2000 VIAL (EA) INJECTION EVERY 8 HOURS
Refills: 0 | Status: COMPLETED | OUTPATIENT
Start: 2020-11-21 | End: 2020-11-22

## 2020-11-21 RX ORDER — METOPROLOL TARTRATE 50 MG
25 TABLET ORAL DAILY
Refills: 0 | Status: DISCONTINUED | OUTPATIENT
Start: 2020-11-21 | End: 2020-11-22

## 2020-11-21 RX ADMIN — SODIUM CHLORIDE 3 MILLILITER(S): 9 INJECTION INTRAMUSCULAR; INTRAVENOUS; SUBCUTANEOUS at 23:10

## 2020-11-21 RX ADMIN — Medication 150 MILLIGRAM(S): at 13:53

## 2020-11-21 RX ADMIN — SODIUM CHLORIDE 75 MILLILITER(S): 9 INJECTION INTRAMUSCULAR; INTRAVENOUS; SUBCUTANEOUS at 23:20

## 2020-11-21 RX ADMIN — Medication 100 MILLIGRAM(S): at 23:21

## 2020-11-21 RX ADMIN — Medication 400 MILLIGRAM(S): at 23:20

## 2020-11-21 RX ADMIN — Medication 1000 MILLIGRAM(S): at 23:40

## 2020-11-21 RX ADMIN — SODIUM CHLORIDE 1000 MILLILITER(S): 9 INJECTION INTRAMUSCULAR; INTRAVENOUS; SUBCUTANEOUS at 20:36

## 2020-11-21 RX ADMIN — SODIUM CHLORIDE 3 MILLILITER(S): 9 INJECTION INTRAMUSCULAR; INTRAVENOUS; SUBCUTANEOUS at 13:55

## 2020-11-21 RX ADMIN — Medication 15 MILLIGRAM(S): at 23:40

## 2020-11-21 RX ADMIN — Medication 15 MILLIGRAM(S): at 23:21

## 2020-11-21 RX ADMIN — SODIUM CHLORIDE 75 MILLILITER(S): 9 INJECTION INTRAMUSCULAR; INTRAVENOUS; SUBCUTANEOUS at 19:20

## 2020-11-21 RX ADMIN — PANTOPRAZOLE SODIUM 40 MILLIGRAM(S): 20 TABLET, DELAYED RELEASE ORAL at 13:52

## 2020-11-21 RX ADMIN — TRAMADOL HYDROCHLORIDE 50 MILLIGRAM(S): 50 TABLET ORAL at 13:52

## 2020-11-21 RX ADMIN — SODIUM CHLORIDE 1000 MILLILITER(S): 9 INJECTION INTRAMUSCULAR; INTRAVENOUS; SUBCUTANEOUS at 19:21

## 2020-11-21 NOTE — PROGRESS NOTE ADULT - SUBJECTIVE AND OBJECTIVE BOX
Pt seen and examined. Pain well controlled. no acute complaints at this time.     Vital Signs Last 24 Hrs  T(C): 36.6 (21 Nov 2020 23:10), Max: 36.6 (21 Nov 2020 11:10)  T(F): 97.9 (21 Nov 2020 23:10), Max: 97.9 (21 Nov 2020 11:10)  HR: 87 (21 Nov 2020 23:10) (69 - 90)  BP: 125/77 (21 Nov 2020 23:10) (95/51 - 148/88)  BP(mean): 70 (21 Nov 2020 19:15) (65 - 80)  RR: 18 (21 Nov 2020 23:10) (14 - 18)  SpO2: 94% (21 Nov 2020 23:10) (94% - 100%)    PE:  Gen: NAD  RLE:   Dressing CDI  Compartments soft and compressible  No calf ttp  KI in place  SCDs in place  +EHL/FHL/Gsc/TA  SILT L2-S1  2+ DP

## 2020-11-21 NOTE — PROGRESS NOTE ADULT - ASSESSMENT
A/P:    66 yo M s/p R Revision TKA POD 0:  -pain control  -WBAT in Knee immobilizer for 2 weeks  -WBAT/PT/OT  -dvt ppx - ASA  -SCDs/Ice/OOB/IS  -FU Labs  -dispo planning

## 2020-11-22 ENCOUNTER — TRANSCRIPTION ENCOUNTER (OUTPATIENT)
Age: 65
End: 2020-11-22

## 2020-11-22 VITALS
TEMPERATURE: 98 F | SYSTOLIC BLOOD PRESSURE: 128 MMHG | RESPIRATION RATE: 18 BRPM | OXYGEN SATURATION: 98 % | DIASTOLIC BLOOD PRESSURE: 76 MMHG | HEART RATE: 90 BPM

## 2020-11-22 LAB
ANION GAP SERPL CALC-SCNC: 13 MMOL/L — SIGNIFICANT CHANGE UP (ref 5–17)
BUN SERPL-MCNC: 17 MG/DL — SIGNIFICANT CHANGE UP (ref 7–23)
CALCIUM SERPL-MCNC: 8.6 MG/DL — SIGNIFICANT CHANGE UP (ref 8.4–10.5)
CHLORIDE SERPL-SCNC: 105 MMOL/L — SIGNIFICANT CHANGE UP (ref 96–108)
CO2 SERPL-SCNC: 24 MMOL/L — SIGNIFICANT CHANGE UP (ref 22–31)
CREAT SERPL-MCNC: 0.91 MG/DL — SIGNIFICANT CHANGE UP (ref 0.5–1.3)
GLUCOSE SERPL-MCNC: 142 MG/DL — HIGH (ref 70–99)
HCT VFR BLD CALC: 38.6 % — LOW (ref 39–50)
HGB BLD-MCNC: 13.6 G/DL — SIGNIFICANT CHANGE UP (ref 13–17)
MCHC RBC-ENTMCNC: 30.5 PG — SIGNIFICANT CHANGE UP (ref 27–34)
MCHC RBC-ENTMCNC: 35.2 GM/DL — SIGNIFICANT CHANGE UP (ref 32–36)
MCV RBC AUTO: 86.5 FL — SIGNIFICANT CHANGE UP (ref 80–100)
NRBC # BLD: 0 /100 WBCS — SIGNIFICANT CHANGE UP (ref 0–0)
PLATELET # BLD AUTO: 241 K/UL — SIGNIFICANT CHANGE UP (ref 150–400)
POTASSIUM SERPL-MCNC: 3.7 MMOL/L — SIGNIFICANT CHANGE UP (ref 3.5–5.3)
POTASSIUM SERPL-SCNC: 3.7 MMOL/L — SIGNIFICANT CHANGE UP (ref 3.5–5.3)
RBC # BLD: 4.46 M/UL — SIGNIFICANT CHANGE UP (ref 4.2–5.8)
RBC # FLD: 12.7 % — SIGNIFICANT CHANGE UP (ref 10.3–14.5)
SODIUM SERPL-SCNC: 142 MMOL/L — SIGNIFICANT CHANGE UP (ref 135–145)
WBC # BLD: 12.71 K/UL — HIGH (ref 3.8–10.5)
WBC # FLD AUTO: 12.71 K/UL — HIGH (ref 3.8–10.5)

## 2020-11-22 PROCEDURE — 82962 GLUCOSE BLOOD TEST: CPT

## 2020-11-22 PROCEDURE — 97161 PT EVAL LOW COMPLEX 20 MIN: CPT

## 2020-11-22 PROCEDURE — 73560 X-RAY EXAM OF KNEE 1 OR 2: CPT

## 2020-11-22 PROCEDURE — 97165 OT EVAL LOW COMPLEX 30 MIN: CPT

## 2020-11-22 PROCEDURE — C1776: CPT

## 2020-11-22 PROCEDURE — 80048 BASIC METABOLIC PNL TOTAL CA: CPT

## 2020-11-22 PROCEDURE — C1713: CPT

## 2020-11-22 PROCEDURE — 85027 COMPLETE CBC AUTOMATED: CPT

## 2020-11-22 RX ORDER — ASPIRIN/CALCIUM CARB/MAGNESIUM 324 MG
1 TABLET ORAL
Qty: 82 | Refills: 0
Start: 2020-11-22 | End: 2021-01-01

## 2020-11-22 RX ORDER — OXYCODONE HYDROCHLORIDE 5 MG/1
1 TABLET ORAL
Qty: 42 | Refills: 0
Start: 2020-11-22 | End: 2020-11-28

## 2020-11-22 RX ADMIN — Medication 975 MILLIGRAM(S): at 13:33

## 2020-11-22 RX ADMIN — Medication 975 MILLIGRAM(S): at 05:41

## 2020-11-22 RX ADMIN — PANTOPRAZOLE SODIUM 40 MILLIGRAM(S): 20 TABLET, DELAYED RELEASE ORAL at 05:41

## 2020-11-22 RX ADMIN — Medication 975 MILLIGRAM(S): at 06:30

## 2020-11-22 RX ADMIN — Medication 15 MILLIGRAM(S): at 05:42

## 2020-11-22 RX ADMIN — AMLODIPINE BESYLATE 10 MILLIGRAM(S): 2.5 TABLET ORAL at 05:41

## 2020-11-22 RX ADMIN — Medication 325 MILLIGRAM(S): at 05:43

## 2020-11-22 RX ADMIN — SODIUM CHLORIDE 3 MILLILITER(S): 9 INJECTION INTRAMUSCULAR; INTRAVENOUS; SUBCUTANEOUS at 05:36

## 2020-11-22 RX ADMIN — Medication 100 MILLIGRAM(S): at 05:41

## 2020-11-22 RX ADMIN — Medication 975 MILLIGRAM(S): at 14:00

## 2020-11-22 RX ADMIN — Medication 100 MILLIGRAM(S): at 07:25

## 2020-11-22 RX ADMIN — Medication 15 MILLIGRAM(S): at 11:40

## 2020-11-22 RX ADMIN — SODIUM CHLORIDE 3 MILLILITER(S): 9 INJECTION INTRAMUSCULAR; INTRAVENOUS; SUBCUTANEOUS at 14:00

## 2020-11-22 RX ADMIN — SODIUM CHLORIDE 1000 MILLILITER(S): 9 INJECTION INTRAMUSCULAR; INTRAVENOUS; SUBCUTANEOUS at 05:41

## 2020-11-22 RX ADMIN — POLYETHYLENE GLYCOL 3350 17 GRAM(S): 17 POWDER, FOR SOLUTION ORAL at 11:41

## 2020-11-22 RX ADMIN — Medication 15 MILLIGRAM(S): at 12:00

## 2020-11-22 RX ADMIN — SODIUM CHLORIDE 75 MILLILITER(S): 9 INJECTION INTRAMUSCULAR; INTRAVENOUS; SUBCUTANEOUS at 05:41

## 2020-11-22 RX ADMIN — Medication 15 MILLIGRAM(S): at 06:30

## 2020-11-22 RX ADMIN — ATORVASTATIN CALCIUM 40 MILLIGRAM(S): 80 TABLET, FILM COATED ORAL at 05:41

## 2020-11-22 RX ADMIN — Medication 25 MILLIGRAM(S): at 05:41

## 2020-11-22 NOTE — DISCHARGE NOTE PROVIDER - NSDCMRMEDTOKEN_GEN_ALL_CORE_FT
acetaminophen 500 mg oral tablet: 2 tab(s) orally every 8 hours as needed for fever, H/A, mild pain  amLODIPine 10 mg oral tablet: 1 tab(s) orally once a day  aspirin 325 mg oral delayed release tablet: 1 tab(s) orally 2 times a day for 6 weeks post op, then resume 81 mg MDD:2  atorvastatin 40 mg oral tablet: 1 tab(s) orally once a day  metoprolol tartrate 25 mg oral tablet: 1 tab(s) orally once a day   naproxen sodium 550 mg oral tablet: 1 tab(s) orally 2 times a day MDD:2  oxyCODONE 5 mg oral tablet: 1 tab(s) orally every 4 hours, As needed, severe pain MDD:6  PriLOSEC OTC 20 mg oral delayed release tablet: 1 tab(s) orally once a day

## 2020-11-22 NOTE — DISCHARGE NOTE NURSING/CASE MANAGEMENT/SOCIAL WORK - PATIENT PORTAL LINK FT
You can access the FollowMyHealth Patient Portal offered by [facility] by registering at the following website: [email or web address]. By joining [facility]’s FollowMyHealth portal, you will also be able to view your health information using other applications (apps) compatible with our system.

## 2020-11-22 NOTE — OCCUPATIONAL THERAPY INITIAL EVALUATION ADULT - LIVES WITH, PROFILE
Pt lives in a pvt home w/  his daughter +1 SILVESTRE, and has family/girlfriend nearby to assist. Pt will remain on second floor for majority of recovery. Pt has a tub shower, denies use no AE or DME. Pt was I with all ADLs and IADLs PTA. +driving

## 2020-11-22 NOTE — PROGRESS NOTE ADULT - ASSESSMENT
A/p: 65y Male s/p R Total Knee Arthroplasty Revision (Femoral/Poly Exchange).  VSS. NAD.    PT/OT-WBAT RLE in Knee immobilizer x 2 weeks  IS  DVT PPx: ASA 325mg BID and SCDs  Pain Control  Continue Current Tx.  Dispo planning to home today    Abelardo Fink PA-C  Orthopedic Surgery Team  Team Pager: #[phone]

## 2020-11-22 NOTE — DISCHARGE NOTE PROVIDER - NSDCFUADDINST_GEN_ALL_CORE_FT
Continue weight bearing as tolerated ambulation with rolling walker. Keep surgical incision/Aquacel dressing clean and dry, follow up with Dr Thomas in his office post operative day #13 (12/4/2020) for wound check and surgical staple removal.

## 2020-11-22 NOTE — PHYSICAL THERAPY INITIAL EVALUATION ADULT - GENERAL OBSERVATIONS, REHAB EVAL
pt olya 40 min eval well. pt s/p R TKA revision, WBAT in KI. pt with h/o TKR and 1 TKR revision. pt rec'd in chair, peripheral IV locked by HUMBERTO Gomez, premedicated, NAD, knee immobilizer.

## 2020-11-22 NOTE — PHYSICAL THERAPY INITIAL EVALUATION ADULT - ADDITIONAL COMMENTS
pt states he lives with daughter in a private home with 1 steps to enter and a flight of steps inside (+handrail). Pt states prior to admission being independent with all functional mobility and ADLs. pt states he owns a RW, straight cane, commode, shower chair.

## 2020-11-22 NOTE — OCCUPATIONAL THERAPY INITIAL EVALUATION ADULT - DIAGNOSIS, OT EVAL
Pt presents w/ R knee immobilizer and limited R knee AROM, however is I with all ADLs and functional mobility.

## 2020-11-22 NOTE — DISCHARGE NOTE PROVIDER - NSDCACTIVITY_GEN_ALL_CORE
Do not drive or operate machinery/Do not make important decisions/No heavy lifting/straining/Walking - Outdoors allowed/Stairs allowed/Walking - Indoors allowed

## 2020-11-22 NOTE — OCCUPATIONAL THERAPY INITIAL EVALUATION ADULT - PERTINENT HX OF CURRENT PROBLEM, REHAB EVAL
66 y/o M with h/o HTN GERD, OA of right knee, s/p R TKR (1/15/20), recovery c/b infection s/p R knee I&D, revision on 2/28/20 and aspiration and corticosteroid injection on 6/4/20 now with c/o R knee pain. pt now presents s/p R TKA revision. Brief OP(11/21): PROCEDURES: Revision total knee arthroplasty 21-Nov-2020 18:22:52  Abelardo Fink.

## 2020-11-22 NOTE — PHYSICAL THERAPY INITIAL EVALUATION ADULT - PERTINENT HX OF CURRENT PROBLEM, REHAB EVAL
64 y/o M with h/o HTN GERD, OA of right knee, s/p R TKR (1/15/20), recovery c/b infection s/p R knee I&D, revision on 2/28/20 and aspiration and corticosteroid injection on 6/4/20 now with c/o R knee pain. pt now presents s/p R TKA revision.

## 2020-11-22 NOTE — PROGRESS NOTE ADULT - SUBJECTIVE AND OBJECTIVE BOX
Post op Day [1]    Patient resting without complaints.  No chest pain, SOB, N/V.    T(C): 36.8 (11-22-20 @ 08:52), Max: 36.8 (11-22-20 @ 08:52)  HR: 97 (11-22-20 @ 09:03) (69 - 104)  BP: 133/76 (11-22-20 @ 09:03) (95/51 - 148/88)  RR: 18 (11-22-20 @ 08:52) (14 - 18)  SpO2: 95% (11-22-20 @ 09:03) (94% - 100%)  Wt(kg): --    Exam:  Alert and Oriented, No Acute Distress    Lower Extremities: R Knee in Knee Immobilizer  Dressing: C/D/I w/ Aquacel  Calves Soft, Non-tender bilaterally  +PF/DF/EHL/FHL  SILT  +DP Pulse    Xray:                           13.6   12.71 )-----------( 241      ( 22 Nov 2020 06:14 )             38.6    11-22    142  |  105  |  17  ----------------------------<  142<H>  3.7   |  24  |  0.91    Ca    8.6      22 Nov 2020 06:14

## 2020-11-22 NOTE — DISCHARGE NOTE PROVIDER - NSDCCPCAREPLAN_GEN_ALL_CORE_FT
PRINCIPAL DISCHARGE DIAGNOSIS  Diagnosis: Other mechanical complication of other internal joint prosthesis  Assessment and Plan of Treatment:

## 2020-11-22 NOTE — DISCHARGE NOTE PROVIDER - HOSPITAL COURSE
Reason for Admission  I am having right knee replacement revision".     History of Present Illness:  History of Present Illness    64 y/o male with h/o HTN, GERD OA of Right knee s/p Right TKR on 1/15/20, recovery complicated by infection s/p Right knee I&D, revision on 02/28/20 and aspiration and corticosteroid injection on 06/04/20 c/o Right knee pain, swelling and buckling. He is scheduled for Right Total Knee Revision Arthroplasty.      Past Medical, Past Surgical History:  PAST MEDICAL HISTORY:  AI (aortic insufficiency) mild/moderate, Echo 02/2019  Bladder polyp 2010 being monitored by urologist  GERD (gastroesophageal reflux disease)   High cholesterol   History of hypertensive retinopathy Left eye retinopathy secondary to uncontrolled HTN , Being monitored by ophthalmologist, eye injections every 10 weeks and Laser Rx  HTN (hypertension)   Osteoarthritis of right knee   Tear of medial meniscus of left knee 10/20/20.     PAST SURGICAL HISTORY:  H/O hernia repair bilateral inguinal hernia 2018  H/O total knee replacement, right 01/15/20; I&D, revision with spacer exchange 02/28/20; aspiration 06/04/20  History of cholecystectomy 2016  S/P reconstruction procedure left arm reconstruction surgery s/p laceration of left forearm with anjel  2010.     HOSPITAL COURSE:  64 y/o M underwent right total knee arthroplasty revision on 11/21/2020 with Dr. Thomas.  Patient tolerated procedure well.  Patient was evaluated postoperatively by physical and occupational therapists for weight bearing as tolerated ambulation and cleared patient for discharge home.  Patient advised to keep surgical incision/dressing clean and dry, and follow up with Dr. Thomas in his office post operative day #13 (12/4/2020).

## 2020-11-22 NOTE — PHYSICAL THERAPY INITIAL EVALUATION ADULT - DISCHARGE DISPOSITION, PT EVAL
Outpatient PT when deemed medically appropriate. pt owns all devices. assist/supervision as needed from daughter. pt cleared for DC from PT standpoint. pt aware and in agreement. please reconsult as appropriate. ANNIKA Reyes and TITA Harrison aware.

## 2020-11-22 NOTE — DISCHARGE NOTE PROVIDER - CARE PROVIDER_API CALL
Denton Thomas  ORTHOPAEDIC SURGERY  [address], [address]  [place] [address]  Phone: [phone]  Fax: [phone]  Follow Up Time:

## 2020-11-23 PROBLEM — Z86.69 PERSONAL HISTORY OF OTHER DISEASES OF THE NERVOUS SYSTEM AND SENSE ORGANS: Chronic | Status: ACTIVE | Noted: 2019-12-31

## 2020-11-23 PROBLEM — S83.242A OTHER TEAR OF MEDIAL MENISCUS, CURRENT INJURY, LEFT KNEE, INITIAL ENCOUNTER: Chronic | Status: ACTIVE | Noted: 2020-11-18

## 2020-11-23 PROBLEM — K21.9 GASTRO-ESOPHAGEAL REFLUX DISEASE WITHOUT ESOPHAGITIS: Chronic | Status: ACTIVE | Noted: 2020-11-18

## 2020-11-23 PROBLEM — I35.1 NONRHEUMATIC AORTIC (VALVE) INSUFFICIENCY: Chronic | Status: ACTIVE | Noted: 2020-11-18

## 2020-11-24 ENCOUNTER — NON-APPOINTMENT (OUTPATIENT)
Age: 65
End: 2020-11-24

## 2020-12-04 ENCOUNTER — APPOINTMENT (OUTPATIENT)
Dept: ORTHOPEDIC SURGERY | Facility: CLINIC | Age: 65
End: 2020-12-04
Payer: OTHER MISCELLANEOUS

## 2020-12-04 VITALS — TEMPERATURE: 97.5 F

## 2020-12-04 PROCEDURE — 99024 POSTOP FOLLOW-UP VISIT: CPT

## 2020-12-04 PROCEDURE — 73560 X-RAY EXAM OF KNEE 1 OR 2: CPT | Mod: RT

## 2020-12-07 RX ORDER — ASPIRIN 81 MG/1
81 TABLET ORAL
Qty: 90 | Refills: 1 | Status: ACTIVE | COMMUNITY

## 2020-12-15 ENCOUNTER — NON-APPOINTMENT (OUTPATIENT)
Age: 65
End: 2020-12-15

## 2020-12-15 ENCOUNTER — APPOINTMENT (OUTPATIENT)
Dept: CARDIOLOGY | Facility: CLINIC | Age: 65
End: 2020-12-15
Payer: MEDICARE

## 2020-12-15 VITALS
TEMPERATURE: 97.2 F | RESPIRATION RATE: 16 BRPM | SYSTOLIC BLOOD PRESSURE: 136 MMHG | HEART RATE: 80 BPM | HEIGHT: 69 IN | WEIGHT: 236 LBS | OXYGEN SATURATION: 98 % | DIASTOLIC BLOOD PRESSURE: 69 MMHG | BODY MASS INDEX: 34.96 KG/M2

## 2020-12-15 VITALS — DIASTOLIC BLOOD PRESSURE: 80 MMHG | HEART RATE: 88 BPM | SYSTOLIC BLOOD PRESSURE: 120 MMHG

## 2020-12-15 PROCEDURE — 93000 ELECTROCARDIOGRAM COMPLETE: CPT

## 2020-12-15 PROCEDURE — 99214 OFFICE O/P EST MOD 30 MIN: CPT

## 2020-12-15 NOTE — REASON FOR VISIT
[Follow-Up - Clinic] : a clinic follow-up of [Aortic Stenosis] : aortic stenosis [Coronary Artery Disease] : coronary artery disease [Hypertension] : hypertension [FreeTextEntry1] : Patient returns for followup. Feeling well. Offers no complaints of chest discomfort shortness of breath palpitations dizziness or syncope.\par

## 2020-12-15 NOTE — ASSESSMENT
[FreeTextEntry1] : Impression:\par 1. Hypertension well controlled\par 2. History of dyslipidemia\par 3. Minimal coronary disease\par 4.Mild to moderate AI\par \par Plan:\par 1. Continue current medical regimen\par

## 2020-12-21 PROBLEM — Z87.09 HISTORY OF UPPER RESPIRATORY INFECTION: Status: RESOLVED | Noted: 2017-08-31 | Resolved: 2020-12-21

## 2021-02-22 ENCOUNTER — RX RENEWAL (OUTPATIENT)
Age: 66
End: 2021-02-22

## 2021-03-05 ENCOUNTER — APPOINTMENT (OUTPATIENT)
Dept: ORTHOPEDIC SURGERY | Facility: CLINIC | Age: 66
End: 2021-03-05
Payer: OTHER MISCELLANEOUS

## 2021-03-05 PROCEDURE — 99214 OFFICE O/P EST MOD 30 MIN: CPT

## 2021-03-05 PROCEDURE — 73562 X-RAY EXAM OF KNEE 3: CPT | Mod: RT

## 2021-03-05 PROCEDURE — 99072 ADDL SUPL MATRL&STAF TM PHE: CPT

## 2021-03-30 ENCOUNTER — FORM ENCOUNTER (OUTPATIENT)
Age: 66
End: 2021-03-30

## 2021-04-05 ENCOUNTER — FORM ENCOUNTER (OUTPATIENT)
Age: 66
End: 2021-04-05

## 2021-05-20 ENCOUNTER — APPOINTMENT (OUTPATIENT)
Dept: INTERNAL MEDICINE | Facility: CLINIC | Age: 66
End: 2021-05-20
Payer: MEDICARE

## 2021-05-20 VITALS
HEART RATE: 83 BPM | OXYGEN SATURATION: 99 % | RESPIRATION RATE: 16 BRPM | WEIGHT: 237 LBS | BODY MASS INDEX: 35.1 KG/M2 | DIASTOLIC BLOOD PRESSURE: 78 MMHG | TEMPERATURE: 97.6 F | SYSTOLIC BLOOD PRESSURE: 140 MMHG | HEIGHT: 69 IN

## 2021-05-20 PROCEDURE — 99215 OFFICE O/P EST HI 40 MIN: CPT

## 2021-05-23 NOTE — REVIEW OF SYSTEMS
[Heartburn] : heartburn [Impotence] : impotence [Poor Libido] : poor libido [Joint Pain] : joint pain [Joint Stiffness] : joint stiffness [Muscle Pain] : muscle pain [Anxiety] : anxiety [Nocturia] : nocturia [Fever] : no fever [Chills] : no chills [Fatigue] : no fatigue [Hot Flashes] : no hot flashes [Night Sweats] : no night sweats [Recent Change In Weight] : ~T no recent weight change [Discharge] : no discharge [Pain] : no pain [Dryness] : no dryness [Redness] : no redness [Vision Problems] : no vision problems [Itching] : no itching [Earache] : no earache [Hearing Loss] : no hearing loss [Nosebleeds] : no nosebleeds [Postnasal Drip] : no postnasal drip [Nasal Discharge] : no nasal discharge [Sore Throat] : no sore throat [Hoarseness] : no hoarseness [Chest Pain] : no chest pain [Palpitations] : no palpitations [Claudication] : no  leg claudication [Lower Ext Edema] : no lower extremity edema [Orthopena] : no orthopnea [Paroxysmal Nocturnal Dyspnea] : no paroxysmal nocturnal dyspnea [Shortness Of Breath] : no shortness of breath [Wheezing] : no wheezing [Cough] : no cough [Dyspnea on Exertion] : not dyspnea on exertion [Abdominal Pain] : no abdominal pain [Nausea] : no nausea [Constipation] : no constipation [Diarrhea] : no diarrhea [Vomiting] : no vomiting [Melena] : no melena [Dysuria] : no dysuria [Incontinence] : no incontinence [Hesitancy] : no hesitancy [Hematuria] : no hematuria [Frequency] : no frequency [Muscle Weakness] : no muscle weakness [Back Pain] : no back pain [Joint Swelling] : no joint swelling [Itching] : no itching [Mole Changes] : no mole changes [Nail Changes] : no nail changes [Hair Changes] : no hair changes [Skin Rash] : no skin rash [Headache] : no headache [Dizziness] : no dizziness [Fainting] : no fainting [Confusion] : no confusion [Unsteady Walk] : no ataxia [Memory Loss] : no memory loss [Suicidal] : not suicidal [Insomnia] : no insomnia [Depression] : no depression [Easy Bleeding] : no easy bleeding [Easy Bruising] : no easy bruising [Swollen Glands] : no swollen glands [FreeTextEntry9] : left and right knee

## 2021-05-23 NOTE — ASSESSMENT
[FreeTextEntry1] : Physical exam shows a well-developed man in no acute distress blood pressure 140/78 height 5 feet 9 inches weight 237 pounds BMI 35 heart rate of 83 respiration at 16 oxygen saturation on room air was 99% HEENT was unremarkable chest was clear cardiovascular exam was regular with no extra heart sounds or murmurs abdomen was soft and nontender extremities showed no clubbing cyanosis or edema neurologic exam was nonfocal patient's blood pressure repeated several times not showing significant elevation as he previously assumed patient was invited to return to the office periodically to see the nurse if he wishes to monitor this more closely slip was given for blood test including CBC full chemistries lipid profile thyroid profile urine analysis CRP hemoglobin A1c vitamins D3 and B12 and IgG antibodies for nuclear capsid COVID-19 patient is being followed actively by an orthopedist concerning his knees patient's heartburn is well controlled he follows regularly with a urologist concerning his bladder cancer patient has had no recent headaches

## 2021-05-23 NOTE — PHYSICAL EXAM
[Well Nourished] : well nourished [Well Developed] : well developed [Well-Appearing] : well-appearing [Normal Voice/Communication] : normal voice/communication [Normal Sclera/Conjunctiva] : normal sclera/conjunctiva [PERRL] : pupils equal round and reactive to light [EOMI] : extraocular movements intact [Normal Outer Ear/Nose] : the outer ears and nose were normal in appearance [Normal Oropharynx] : the oropharynx was normal [Normal Nasal Mucosa] : the nasal mucosa was normal [Normal TMs] : both tympanic membranes were normal [No Lymphadenopathy] : no lymphadenopathy [No JVD] : no jugular venous distention [Supple] : supple [Thyroid Normal, No Nodules] : the thyroid was normal and there were no nodules present [No Respiratory Distress] : no respiratory distress  [No Accessory Muscle Use] : no accessory muscle use [Clear to Auscultation] : lungs were clear to auscultation bilaterally [Normal Percussion] : the chest was normal to percussion [Normal Rate] : normal rate  [Regular Rhythm] : with a regular rhythm [Normal S1, S2] : normal S1 and S2 [No Carotid Bruits] : no carotid bruits [No Abdominal Bruit] : a ~M bruit was not heard ~T in the abdomen [No Varicosities] : no varicosities [Pedal Pulses Present] : the pedal pulses are present [No Edema] : there was no peripheral edema [No Palpable Aorta] : no palpable aorta [No Extremity Clubbing/Cyanosis] : no extremity clubbing/cyanosis [Declined Breast Exam] : declined breast exam  [Soft] : abdomen soft [Non Tender] : non-tender [Non-distended] : non-distended [No Masses] : no abdominal mass palpated [No HSM] : no HSM [Normal Bowel Sounds] : normal bowel sounds [No Hernias] : no hernias [Declined Rectal Exam] : declined rectal exam [Normal Supraclavicular Nodes] : no supraclavicular lymphadenopathy [Normal Axillary Nodes] : no axillary lymphadenopathy [Normal Posterior Cervical Nodes] : no posterior cervical lymphadenopathy [Normal Anterior Cervical Nodes] : no anterior cervical lymphadenopathy [Normal Inguinal Nodes] : no inguinal lymphadenopathy [Normal Femoral Nodes] : no femoral lymphadenopathy [No CVA Tenderness] : no CVA  tenderness [No Spinal Tenderness] : no spinal tenderness [No Joint Swelling] : no joint swelling [Grossly Normal Strength/Tone] : grossly normal strength/tone [No Rash] : no rash [No Skin Lesions] : no skin lesions [Coordination Grossly Intact] : coordination grossly intact [No Focal Deficits] : no focal deficits [Normal Gait] : normal gait [Deep Tendon Reflexes (DTR)] : deep tendon reflexes were 2+ and symmetric [Speech Grossly Normal] : speech grossly normal [Memory Grossly Normal] : memory grossly normal [Normal Affect] : the affect was normal [Normal Mood] : the mood was normal [Alert and Oriented x3] : oriented to person, place, and time [Normal Insight/Judgement] : insight and judgment were intact [Kyphosis] : no kyphosis [Scoliosis] : no scoliosis [Acne] : no acne [de-identified] : 1/6 systolic

## 2021-05-23 NOTE — HISTORY OF PRESENT ILLNESS
[FreeTextEntry1] : 66-year-old man comes to the office for follow-up to review his medications and discuss his overall health recently noting blood pressure to be mildly elevated [de-identified] : Comes to the office for follow-up with a history of benign essential hypertension bladder cancer erectile dysfunction gastroesophageal reflux disease cryptogenic stroke hyperlipidemia and occasional headaches patient denies temperature chills sweats or myalgias he has had no headaches recently denies sinus congestion sore throat cough wheezing pleurisy chest pain shortness of breath exertional dyspnea lightheadedness palpitations dizziness vertigo or syncope he gets occasional heartburn but denies abdominal pain nausea vomiting diarrhea constipation bright red blood per rectum or black stools his appetite has been good his weight has been stable he has had no edema in his legs he does have chronic discomfort in his knees and does occasionally get up at night to urinate but denies dysuria or gross hematuria he has had no skin rashes has chronic anxiety recently has been sleeping adequately

## 2021-06-03 ENCOUNTER — TRANSCRIPTION ENCOUNTER (OUTPATIENT)
Age: 66
End: 2021-06-03

## 2021-06-15 ENCOUNTER — APPOINTMENT (OUTPATIENT)
Dept: CARDIOLOGY | Facility: CLINIC | Age: 66
End: 2021-06-15
Payer: MEDICARE

## 2021-06-15 ENCOUNTER — NON-APPOINTMENT (OUTPATIENT)
Age: 66
End: 2021-06-15

## 2021-06-15 VITALS — SYSTOLIC BLOOD PRESSURE: 122 MMHG | DIASTOLIC BLOOD PRESSURE: 82 MMHG | HEART RATE: 72 BPM

## 2021-06-15 VITALS
RESPIRATION RATE: 16 BRPM | HEIGHT: 69 IN | WEIGHT: 244 LBS | HEART RATE: 79 BPM | TEMPERATURE: 97.2 F | DIASTOLIC BLOOD PRESSURE: 83 MMHG | BODY MASS INDEX: 36.14 KG/M2 | SYSTOLIC BLOOD PRESSURE: 144 MMHG | OXYGEN SATURATION: 97 %

## 2021-06-15 PROCEDURE — 93000 ELECTROCARDIOGRAM COMPLETE: CPT

## 2021-06-15 PROCEDURE — 93306 TTE W/DOPPLER COMPLETE: CPT

## 2021-06-15 PROCEDURE — 99214 OFFICE O/P EST MOD 30 MIN: CPT

## 2021-06-15 NOTE — REASON FOR VISIT
[Structural Heart and Valve Disease] : structural heart and valve disease [Hyperlipidemia] : hyperlipidemia [Hypertension] : hypertension [Coronary Artery Disease] : coronary artery disease [FreeTextEntry1] : Patient returns for followup. He is feeling well. He offers no complaints of chest discomfort shortness of breath palpitations dizziness or syncope. He is scheduled to have an echocardiogram later today. Most recent lipid profile will total cholesterol 122 HDL 38 LDL 61

## 2021-06-15 NOTE — ASSESSMENT
[FreeTextEntry1] : Impression:\par 1. Hypertension well controlled\par 2. History of dyslipidemia at goal on statn\par 3. Minimal coronary disease\par 4.Mild to moderate AI echo pending\par \par Plan:\par 1. Continue current medical regimen\par

## 2021-06-15 NOTE — PHYSICAL EXAM
[General Appearance - Well Developed] : well developed [Normal Appearance] : normal appearance [Well Groomed] : well groomed [General Appearance - Well Nourished] : well nourished [No Deformities] : no deformities [General Appearance - In No Acute Distress] : no acute distress [Normal Conjunctiva] : the conjunctiva exhibited no abnormalities [Normal Oral Mucosa] : normal oral mucosa [No Oral Pallor] : no oral pallor [Normal Oropharynx] : normal oropharynx [Normal Jugular Venous V Waves Present] : normal jugular venous V waves present [Respiration, Rhythm And Depth] : normal respiratory rhythm and effort [Auscultation Breath Sounds / Voice Sounds] : lungs were clear to auscultation bilaterally [Abdomen Soft] : soft [Abdomen Tenderness] : non-tender [Abnormal Walk] : normal gait [Nail Clubbing] : no clubbing of the fingernails [Skin Color & Pigmentation] : normal skin color and pigmentation [Cyanosis, Localized] : no localized cyanosis [] : no rash [Oriented To Time, Place, And Person] : oriented to person, place, and time [No Anxiety] : not feeling anxious [Normal] : normal [No Precordial Heave] : no precordial heave was noted [Normal Rate] : normal [Rhythm Regular] : regular [Normal S1] : normal S1 [Normal S2] : normal S2 [No Gallop] : no gallop heard [S3] : no S3 [S4] : no S4 [Click] : no click [Pericardial Rub] : no pericardial rub [I] : a grade 1 [2+] : left 2+ [Right Carotid Bruit] : no bruit heard over the right carotid [Left Carotid Bruit] : no bruit heard over the left carotid [No Pitting Edema] : no pitting edema present

## 2021-06-28 ENCOUNTER — RX RENEWAL (OUTPATIENT)
Age: 66
End: 2021-06-28

## 2021-07-06 LAB
25(OH)D3 SERPL-MCNC: 23.5 NG/ML
ALBUMIN SERPL ELPH-MCNC: 4.8 G/DL
ALP BLD-CCNC: 107 U/L
ALT SERPL-CCNC: 64 U/L
ANION GAP SERPL CALC-SCNC: 12 MMOL/L
APPEARANCE: CLEAR
AST SERPL-CCNC: 35 U/L
BASOPHILS # BLD AUTO: 0.05 K/UL
BASOPHILS NFR BLD AUTO: 0.7 %
BILIRUB SERPL-MCNC: 2.5 MG/DL
BILIRUBIN URINE: NEGATIVE
BLOOD URINE: NEGATIVE
BUN SERPL-MCNC: 17 MG/DL
CALCIUM SERPL-MCNC: 10 MG/DL
CHLORIDE SERPL-SCNC: 104 MMOL/L
CHOLEST SERPL-MCNC: 122 MG/DL
CO2 SERPL-SCNC: 29 MMOL/L
COLOR: YELLOW
COVID-19 NUCLEOCAPSID  GAM ANTIBODY INTERPRETATION: NEGATIVE
CREAT SERPL-MCNC: 1.05 MG/DL
CRP SERPL HS-MCNC: 0.6 MG/L
EOSINOPHIL # BLD AUTO: 0.2 K/UL
EOSINOPHIL NFR BLD AUTO: 2.8 %
ESTIMATED AVERAGE GLUCOSE: 105 MG/DL
GLUCOSE BS SERPL-MCNC: 101 MG/DL
GLUCOSE QUALITATIVE U: NORMAL
GLUCOSE SERPL-MCNC: 110 MG/DL
HBA1C MFR BLD HPLC: 5.3 %
HCT VFR BLD CALC: 45.2 %
HDLC SERPL-MCNC: 38 MG/DL
HGB BLD-MCNC: 15.6 G/DL
IMM GRANULOCYTES NFR BLD AUTO: 0.1 %
KETONES URINE: NEGATIVE
LDLC SERPL CALC-MCNC: 61 MG/DL
LEUKOCYTE ESTERASE URINE: NEGATIVE
LYMPHOCYTES # BLD AUTO: 1.87 K/UL
LYMPHOCYTES NFR BLD AUTO: 26.6 %
MAN DIFF?: NORMAL
MCHC RBC-ENTMCNC: 29.8 PG
MCHC RBC-ENTMCNC: 34.5 GM/DL
MCV RBC AUTO: 86.3 FL
MONOCYTES # BLD AUTO: 0.54 K/UL
MONOCYTES NFR BLD AUTO: 7.7 %
NEUTROPHILS # BLD AUTO: 4.37 K/UL
NEUTROPHILS NFR BLD AUTO: 62.1 %
NITRITE URINE: NEGATIVE
NONHDLC SERPL-MCNC: 84 MG/DL
PH URINE: 6
PLATELET # BLD AUTO: 289 K/UL
POTASSIUM SERPL-SCNC: 4.4 MMOL/L
PROT SERPL-MCNC: 6.9 G/DL
PROTEIN URINE: NORMAL
PSA FREE FLD-MCNC: 50 %
PSA FREE SERPL-MCNC: 0.69 NG/ML
PSA SERPL-MCNC: 1.38 NG/ML
RBC # BLD: 5.24 M/UL
RBC # FLD: 14.3 %
SARS-COV-2 AB SERPL QL IA: 0.08 INDEX
SODIUM SERPL-SCNC: 145 MMOL/L
SPECIFIC GRAVITY URINE: 1.02
T4 FREE SERPL-MCNC: 1.2 NG/DL
TRIGL SERPL-MCNC: 116 MG/DL
TSH SERPL-ACNC: 2.63 UIU/ML
UROBILINOGEN URINE: NORMAL
VIT B12 SERPL-MCNC: 502 PG/ML
WBC # FLD AUTO: 7.04 K/UL

## 2021-07-28 ENCOUNTER — APPOINTMENT (OUTPATIENT)
Dept: INTERNAL MEDICINE | Facility: CLINIC | Age: 66
End: 2021-07-28
Payer: MEDICARE

## 2021-07-28 DIAGNOSIS — N52.9 MALE ERECTILE DYSFUNCTION, UNSPECIFIED: ICD-10-CM

## 2021-07-28 DIAGNOSIS — T63.441A TOXIC EFFECT OF VENOM OF BEES, ACCIDENTAL (UNINTENTIONAL), INITIAL ENCOUNTER: ICD-10-CM

## 2021-07-28 PROCEDURE — 96372 THER/PROPH/DIAG INJ SC/IM: CPT

## 2021-07-28 PROCEDURE — 99215 OFFICE O/P EST HI 40 MIN: CPT | Mod: 25

## 2021-08-01 VITALS
HEART RATE: 74 BPM | SYSTOLIC BLOOD PRESSURE: 122 MMHG | DIASTOLIC BLOOD PRESSURE: 80 MMHG | RESPIRATION RATE: 16 BRPM | TEMPERATURE: 97.2 F | OXYGEN SATURATION: 98 % | WEIGHT: 244 LBS | HEIGHT: 69 IN | BODY MASS INDEX: 36.14 KG/M2

## 2021-08-01 PROBLEM — N52.9 ERECTILE DYSFUNCTION: Status: ACTIVE | Noted: 2019-03-23

## 2021-08-01 RX ADMIN — TRIAMCINOLONE ACETONIDE 0 MG/ML: 40 INJECTION, SUSPENSION INTRA-ARTICULAR; INTRAMUSCULAR at 00:00

## 2021-08-01 NOTE — REVIEW OF SYSTEMS
[Heartburn] : heartburn [Nocturia] : nocturia [Impotence] : impotence [Poor Libido] : poor libido [Joint Pain] : joint pain [Joint Stiffness] : joint stiffness [Muscle Pain] : muscle pain [Skin Rash] : skin rash [Anxiety] : anxiety [Fever] : no fever [Chills] : no chills [Fatigue] : no fatigue [Hot Flashes] : no hot flashes [Night Sweats] : no night sweats [Recent Change In Weight] : ~T no recent weight change [Discharge] : no discharge [Pain] : no pain [Redness] : no redness [Dryness] : no dryness [Vision Problems] : no vision problems [Itching] : no itching [Earache] : no earache [Hearing Loss] : no hearing loss [Nosebleeds] : no nosebleeds [Postnasal Drip] : no postnasal drip [Nasal Discharge] : no nasal discharge [Sore Throat] : no sore throat [Hoarseness] : no hoarseness [Chest Pain] : no chest pain [Palpitations] : no palpitations [Claudication] : no  leg claudication [Lower Ext Edema] : no lower extremity edema [Orthopena] : no orthopnea [Paroxysmal Nocturnal Dyspnea] : no paroxysmal nocturnal dyspnea [Shortness Of Breath] : no shortness of breath [Wheezing] : no wheezing [Cough] : no cough [Dyspnea on Exertion] : not dyspnea on exertion [Abdominal Pain] : no abdominal pain [Nausea] : no nausea [Constipation] : no constipation [Diarrhea] : no diarrhea [Vomiting] : no vomiting [Melena] : no melena [Dysuria] : no dysuria [Incontinence] : no incontinence [Hesitancy] : no hesitancy [Hematuria] : no hematuria [Frequency] : no frequency [Muscle Weakness] : no muscle weakness [Back Pain] : no back pain [Joint Swelling] : no joint swelling [Mole Changes] : no mole changes [Nail Changes] : no nail changes [Hair Changes] : no hair changes [Headache] : no headache [Dizziness] : no dizziness [Fainting] : no fainting [Confusion] : no confusion [Unsteady Walk] : no ataxia [Memory Loss] : no memory loss [Suicidal] : not suicidal [Insomnia] : no insomnia [Depression] : no depression [Easy Bleeding] : no easy bleeding [Easy Bruising] : no easy bruising [Swollen Glands] : no swollen glands [FreeTextEntry9] : left and right knee [de-identified] : left hand swelling

## 2021-08-01 NOTE — HISTORY OF PRESENT ILLNESS
[FreeTextEntry1] : 66-year-old man comes to the office for follow-up to review his medications and discuss his overall health patient was staying by a bee on his left hand  [de-identified] : Comes to the office for follow-up with a history of arthritis chronic headaches hyperlipidemia erectile dysfunction gastroesophageal reflux disease bladder cancer benign essential hypertension patient had a bee sting his left hand yesterday and it has swelled up significantly he denies temperature chills sweats or myalgias has occasional headaches but denies sinus congestion sore throat cough wheezing pleurisy chest pain shortness of breath exertional dyspnea lightheadedness palpitations dizziness vertigo or syncope he has had no abdominal pain nausea vomiting diarrhea constipation bright red blood per rectum or black stools his appetite has been good his weight has been stable does get up at night to urinate but denies dysuria or gross hematuria he has chronic discomfort in his left and right knees his extremities show a markedly swollen and erythematous left hand and denies insomnia but has bouts of anxiety

## 2021-08-01 NOTE — PHYSICAL EXAM
[No Acute Distress] : no acute distress [Well Nourished] : well nourished [Well Developed] : well developed [Well-Appearing] : well-appearing [Normal Voice/Communication] : normal voice/communication [Normal Sclera/Conjunctiva] : normal sclera/conjunctiva [PERRL] : pupils equal round and reactive to light [EOMI] : extraocular movements intact [Normal Outer Ear/Nose] : the outer ears and nose were normal in appearance [Normal Oropharynx] : the oropharynx was normal [Normal TMs] : both tympanic membranes were normal [Normal Nasal Mucosa] : the nasal mucosa was normal [No JVD] : no jugular venous distention [No Lymphadenopathy] : no lymphadenopathy [Supple] : supple [Thyroid Normal, No Nodules] : the thyroid was normal and there were no nodules present [No Respiratory Distress] : no respiratory distress  [No Accessory Muscle Use] : no accessory muscle use [Clear to Auscultation] : lungs were clear to auscultation bilaterally [Normal Percussion] : the chest was normal to percussion [Normal Rate] : normal rate  [Regular Rhythm] : with a regular rhythm [Normal S1, S2] : normal S1 and S2 [No Carotid Bruits] : no carotid bruits [No Abdominal Bruit] : a ~M bruit was not heard ~T in the abdomen [No Varicosities] : no varicosities [Pedal Pulses Present] : the pedal pulses are present [No Edema] : there was no peripheral edema [No Palpable Aorta] : no palpable aorta [No Extremity Clubbing/Cyanosis] : no extremity clubbing/cyanosis [Declined Breast Exam] : declined breast exam  [Soft] : abdomen soft [Non Tender] : non-tender [Non-distended] : non-distended [No Masses] : no abdominal mass palpated [No HSM] : no HSM [Normal Bowel Sounds] : normal bowel sounds [No Hernias] : no hernias [Declined Rectal Exam] : declined rectal exam [Normal Supraclavicular Nodes] : no supraclavicular lymphadenopathy [Normal Axillary Nodes] : no axillary lymphadenopathy [Normal Posterior Cervical Nodes] : no posterior cervical lymphadenopathy [Normal Anterior Cervical Nodes] : no anterior cervical lymphadenopathy [Normal Inguinal Nodes] : no inguinal lymphadenopathy [Normal Femoral Nodes] : no femoral lymphadenopathy [No CVA Tenderness] : no CVA  tenderness [No Spinal Tenderness] : no spinal tenderness [No Joint Swelling] : no joint swelling [Grossly Normal Strength/Tone] : grossly normal strength/tone [No Rash] : no rash [Coordination Grossly Intact] : coordination grossly intact [No Focal Deficits] : no focal deficits [Normal Gait] : normal gait [Deep Tendon Reflexes (DTR)] : deep tendon reflexes were 2+ and symmetric [Speech Grossly Normal] : speech grossly normal [Memory Grossly Normal] : memory grossly normal [Normal Affect] : the affect was normal [Alert and Oriented x3] : oriented to person, place, and time [Normal Mood] : the mood was normal [Normal Insight/Judgement] : insight and judgment were intact [Kyphosis] : no kyphosis [Scoliosis] : no scoliosis [Acne] : no acne [de-identified] : 1/6 systolic [de-identified] : left hand swollen

## 2021-08-01 NOTE — ASSESSMENT
[FreeTextEntry1] : Physical examination shows a well-developed man in no acute distress blood pressure 122/80 height 5 feet 9 inches weight 244 pounds BMI 36.03 temperature of 97.2 °F heart rate of 74 respirations 16 oxygen saturation on room air was 98%.  HEENT was unremarkable chest was clear cardiovascular exam was regular with no extra heart sounds or murmurs abdomen was soft and nontender extremities showed no clubbing cyanosis or edema neurologic exam was nonfocal left hand markedly swollen and edematous there was no cellulitis extending up the arm there was no severe tenderness impression bee sting allergic reaction 40 mg of Kenalog was given via injection patient will elevate use cool compresses and take Benadryl around the clock patient is followed regularly by his cardiologist and has frequent visits with an orthopedist concerning his knees Patient had complete blood test in June 2021 just recently in June 2021 had an echocardiogram he is up-to-date with his ophthalmologist and dermatologist he will forward me the type and dates of his COVID-19 vaccination he was recommended to get the new shingles vaccine and both the Prevnar 13 and the Pneumovax 23 he has aware of the need to schedule a screening colonoscopy his blood pressure was well controlled at the present visit patient has had minimal episodes of acid reflux does follow with urologist concerning his bladder cancer

## 2021-08-23 ENCOUNTER — RX RENEWAL (OUTPATIENT)
Age: 66
End: 2021-08-23

## 2021-11-10 ENCOUNTER — APPOINTMENT (OUTPATIENT)
Dept: ORTHOPEDIC SURGERY | Facility: CLINIC | Age: 66
End: 2021-11-10
Payer: OTHER MISCELLANEOUS

## 2021-11-10 VITALS — HEIGHT: 69 IN | BODY MASS INDEX: 34.66 KG/M2 | WEIGHT: 234 LBS

## 2021-11-10 PROCEDURE — 73560 X-RAY EXAM OF KNEE 1 OR 2: CPT | Mod: RT

## 2021-11-10 PROCEDURE — 99072 ADDL SUPL MATRL&STAF TM PHE: CPT

## 2021-11-10 PROCEDURE — 99214 OFFICE O/P EST MOD 30 MIN: CPT

## 2021-12-09 DIAGNOSIS — J06.9 ACUTE UPPER RESPIRATORY INFECTION, UNSPECIFIED: ICD-10-CM

## 2021-12-14 ENCOUNTER — APPOINTMENT (OUTPATIENT)
Dept: CARDIOLOGY | Facility: CLINIC | Age: 66
End: 2021-12-14
Payer: MEDICARE

## 2021-12-14 VITALS — SYSTOLIC BLOOD PRESSURE: 124 MMHG | HEART RATE: 96 BPM | DIASTOLIC BLOOD PRESSURE: 82 MMHG

## 2021-12-14 VITALS — OXYGEN SATURATION: 97 % | SYSTOLIC BLOOD PRESSURE: 147 MMHG | HEART RATE: 96 BPM | DIASTOLIC BLOOD PRESSURE: 72 MMHG

## 2021-12-14 PROCEDURE — 99214 OFFICE O/P EST MOD 30 MIN: CPT

## 2021-12-14 PROCEDURE — 93000 ELECTROCARDIOGRAM COMPLETE: CPT

## 2021-12-14 NOTE — REASON FOR VISIT
[Structural Heart and Valve Disease] : structural heart and valve disease [Hypertension] : hypertension [FreeTextEntry1] : Patient returns for followup. Feeling well. Offers no complaints of chest discomfort shortness of breath palpitations dizziness or syncope.\par

## 2021-12-14 NOTE — ASSESSMENT
[FreeTextEntry1] : Impression:\par 1. Hypertension well controlled\par 2. History of dyslipidemia at goal on statn\par 3. Minimal coronary disease\par 4.Mild to moderate AS mild AI\par \par Plan:\par 1. Continue current medical regimen\par

## 2022-02-10 ENCOUNTER — APPOINTMENT (OUTPATIENT)
Dept: ORTHOPEDIC SURGERY | Facility: CLINIC | Age: 67
End: 2022-02-10
Payer: OTHER MISCELLANEOUS

## 2022-02-10 PROCEDURE — 99214 OFFICE O/P EST MOD 30 MIN: CPT

## 2022-02-10 PROCEDURE — 99072 ADDL SUPL MATRL&STAF TM PHE: CPT

## 2022-02-10 PROCEDURE — 73562 X-RAY EXAM OF KNEE 3: CPT | Mod: RT

## 2022-02-21 ENCOUNTER — RX RENEWAL (OUTPATIENT)
Age: 67
End: 2022-02-21

## 2022-03-23 ENCOUNTER — FORM ENCOUNTER (OUTPATIENT)
Age: 67
End: 2022-03-23

## 2022-03-24 ENCOUNTER — FORM ENCOUNTER (OUTPATIENT)
Age: 67
End: 2022-03-24

## 2022-04-08 NOTE — PHARMACOTHERAPY INTERVENTION NOTE - INTERVENTION CATEGORIES
Med Reconciliation Eucrisa Counseling: Patient may experience a mild burning sensation during topical application. Eucrisa is not approved in children less than 2 years of age.

## 2022-05-10 ENCOUNTER — APPOINTMENT (OUTPATIENT)
Dept: ORTHOPEDIC SURGERY | Facility: CLINIC | Age: 67
End: 2022-05-10

## 2022-05-10 VITALS — HEIGHT: 69 IN | BODY MASS INDEX: 33.33 KG/M2 | WEIGHT: 225 LBS

## 2022-05-10 DIAGNOSIS — S83.242D OTHER TEAR OF MEDIAL MENISCUS, CURRENT INJURY, LEFT KNEE, SUBSEQUENT ENCOUNTER: ICD-10-CM

## 2022-05-10 DIAGNOSIS — M17.11 UNILATERAL PRIMARY OSTEOARTHRITIS, RIGHT KNEE: ICD-10-CM

## 2022-05-10 DIAGNOSIS — S83.241A OTHER TEAR OF MEDIAL MENISCUS, CURRENT INJURY, RIGHT KNEE, INITIAL ENCOUNTER: ICD-10-CM

## 2022-05-10 DIAGNOSIS — M25.562 PAIN IN LEFT KNEE: ICD-10-CM

## 2022-05-10 PROCEDURE — 99072 ADDL SUPL MATRL&STAF TM PHE: CPT

## 2022-05-10 PROCEDURE — 99213 OFFICE O/P EST LOW 20 MIN: CPT

## 2022-05-25 ENCOUNTER — APPOINTMENT (OUTPATIENT)
Dept: INTERNAL MEDICINE | Facility: CLINIC | Age: 67
End: 2022-05-25
Payer: MEDICARE

## 2022-05-25 VITALS
SYSTOLIC BLOOD PRESSURE: 120 MMHG | RESPIRATION RATE: 18 BRPM | HEIGHT: 69 IN | HEART RATE: 105 BPM | DIASTOLIC BLOOD PRESSURE: 64 MMHG | OXYGEN SATURATION: 97 % | WEIGHT: 227 LBS | BODY MASS INDEX: 33.62 KG/M2 | TEMPERATURE: 97.3 F

## 2022-05-25 DIAGNOSIS — R06.00 DYSPNEA, UNSPECIFIED: ICD-10-CM

## 2022-05-25 DIAGNOSIS — R51.9 HEADACHE, UNSPECIFIED: ICD-10-CM

## 2022-05-25 DIAGNOSIS — G89.29 HEADACHE, UNSPECIFIED: ICD-10-CM

## 2022-05-25 DIAGNOSIS — I35.1 NONRHEUMATIC AORTIC (VALVE) INSUFFICIENCY: ICD-10-CM

## 2022-05-25 DIAGNOSIS — R05.3 CHRONIC COUGH: ICD-10-CM

## 2022-05-25 PROCEDURE — 99215 OFFICE O/P EST HI 40 MIN: CPT

## 2022-05-25 RX ORDER — DIPHENHYDRAMINE HCL 25 MG/1
25 CAPSULE ORAL EVERY 6 HOURS
Qty: 30 | Refills: 0 | Status: DISCONTINUED | COMMUNITY
Start: 2021-07-28 | End: 2022-05-25

## 2022-05-25 RX ORDER — AZITHROMYCIN 250 MG/1
250 TABLET, FILM COATED ORAL
Qty: 1 | Refills: 0 | Status: DISCONTINUED | COMMUNITY
Start: 2021-12-09 | End: 2022-05-25

## 2022-05-25 RX ORDER — AMOXICILLIN AND CLAVULANATE POTASSIUM 875; 125 MG/1; MG/1
875-125 TABLET, COATED ORAL
Qty: 20 | Refills: 0 | Status: DISCONTINUED | COMMUNITY
Start: 2021-07-28 | End: 2022-05-25

## 2022-05-25 RX ORDER — PANTOPRAZOLE 40 MG/1
40 TABLET, DELAYED RELEASE ORAL
Qty: 30 | Refills: 1 | Status: DISCONTINUED | COMMUNITY
Start: 2020-01-16 | End: 2022-05-25

## 2022-06-02 PROBLEM — I35.1 MODERATE AORTIC REGURGITATION: Status: ACTIVE | Noted: 2019-12-05

## 2022-06-02 PROBLEM — R05.3 COUGH, PERSISTENT: Status: ACTIVE | Noted: 2020-05-01

## 2022-06-02 NOTE — ASSESSMENT
[FreeTextEntry1] : Physical examination shows a well-developed man in no acute distress pressure 120/64 height 5 foot 9 inches weight 227 pounds BMI 33.52 temperature 97.3 °F heart rate of 105 respirations at 16 oxygen saturation on room air was 97% HEENT there was some mucus in the nostrils was no pharyngitis denies swollen lymph nodes chest was clear cardiovascular was regular abdomen was soft extremities showed no edema neurologic exam was nonfocal patient with a persisting cough upper respiratory infection versus allergies will encourage him to get a COVID PCR have begun him on azithromycin Mucinex DM and montelukast 10 mg at bedtime patient is followed regularly by his cardiologist he also is followed regularly by his orthopedist patient has had 3 COVID vaccines blood pressure is well controlled at the present visit he follows with urology in view of his previous bladder cancer has periodic echocardiograms to follow his aortic regurgitation and aortic stenosis

## 2022-06-02 NOTE — PHYSICAL EXAM
[No Acute Distress] : no acute distress [Well Nourished] : well nourished [Well Developed] : well developed [Well-Appearing] : well-appearing [Normal Voice/Communication] : normal voice/communication [Normal Sclera/Conjunctiva] : normal sclera/conjunctiva [PERRL] : pupils equal round and reactive to light [EOMI] : extraocular movements intact [Normal Outer Ear/Nose] : the outer ears and nose were normal in appearance [Normal Oropharynx] : the oropharynx was normal [Normal TMs] : both tympanic membranes were normal [Normal Nasal Mucosa] : the nasal mucosa was normal [No JVD] : no jugular venous distention [No Lymphadenopathy] : no lymphadenopathy [Supple] : supple [Thyroid Normal, No Nodules] : the thyroid was normal and there were no nodules present [No Respiratory Distress] : no respiratory distress  [No Accessory Muscle Use] : no accessory muscle use [Clear to Auscultation] : lungs were clear to auscultation bilaterally [Normal Percussion] : the chest was normal to percussion [Normal Rate] : normal rate  [Regular Rhythm] : with a regular rhythm [Normal S1, S2] : normal S1 and S2 [No Carotid Bruits] : no carotid bruits [No Abdominal Bruit] : a ~M bruit was not heard ~T in the abdomen [No Varicosities] : no varicosities [Pedal Pulses Present] : the pedal pulses are present [No Edema] : there was no peripheral edema [No Palpable Aorta] : no palpable aorta [No Extremity Clubbing/Cyanosis] : no extremity clubbing/cyanosis [Declined Breast Exam] : declined breast exam  [Soft] : abdomen soft [Non Tender] : non-tender [Non-distended] : non-distended [No Masses] : no abdominal mass palpated [No HSM] : no HSM [Normal Bowel Sounds] : normal bowel sounds [No Hernias] : no hernias [Declined Rectal Exam] : declined rectal exam [Normal Supraclavicular Nodes] : no supraclavicular lymphadenopathy [Normal Axillary Nodes] : no axillary lymphadenopathy [Normal Posterior Cervical Nodes] : no posterior cervical lymphadenopathy [Normal Anterior Cervical Nodes] : no anterior cervical lymphadenopathy [Normal Inguinal Nodes] : no inguinal lymphadenopathy [Normal Femoral Nodes] : no femoral lymphadenopathy [No CVA Tenderness] : no CVA  tenderness [No Spinal Tenderness] : no spinal tenderness [Kyphosis] : no kyphosis [Scoliosis] : no scoliosis [No Joint Swelling] : no joint swelling [Grossly Normal Strength/Tone] : grossly normal strength/tone [No Rash] : no rash [Acne] : no acne [Coordination Grossly Intact] : coordination grossly intact [No Focal Deficits] : no focal deficits [Normal Gait] : normal gait [Deep Tendon Reflexes (DTR)] : deep tendon reflexes were 2+ and symmetric [Speech Grossly Normal] : speech grossly normal [Memory Grossly Normal] : memory grossly normal [Normal Affect] : the affect was normal [Alert and Oriented x3] : oriented to person, place, and time [Normal Mood] : the mood was normal [Normal Insight/Judgement] : insight and judgment were intact [de-identified] : 1/6 systolic [de-identified] : left hand swollen

## 2022-06-02 NOTE — HEALTH RISK ASSESSMENT
[Never] : Never [Yes] : Yes [No falls in past year] : Patient reported no falls in the past year [0] : 2) Feeling down, depressed, or hopeless: Not at all (0) [PHQ-2 Negative - No further assessment needed] : PHQ-2 Negative - No further assessment needed [de-identified] : occasional [[id number]] : 0

## 2022-06-02 NOTE — HISTORY OF PRESENT ILLNESS
[FreeTextEntry1] : 67-year-old man comes to the office for follow-up to review his medications and discuss his overall health recent issues with a persisting cough associated with nasal congestion and postnasal drip [de-identified] : Comes to the office for follow-up with a history of nonrheumatic aortic valve stenosis osteoarthritis status post total knee replacement hyperlipidemia GERD cryptogenic stroke bladder cancer and hypertension patient has recently had a persistent cough over the past several weeks denies temperature chills sweats or myalgias denies headaches sinus congestion has some mild postnasal drip and nasal congestion has had no sore throat cough is mostly dry denies wheezing pleurisy chest pain shortness of breath exertional dyspnea lightheadedness palpitations dizziness vertigo or syncope does have occasional heartburn but denies abdominal pain nausea vomiting diarrhea constipation bright red blood per rectum or black stools appetite has been good weight has been stable does get up at night to urinate but denies dysuria or gross hematuria has had no leg edema skin rashes occasional pain in his knees has bouts of anxiety but has been sleeping adequately recently

## 2022-06-02 NOTE — REVIEW OF SYSTEMS
[Fever] : no fever [Chills] : no chills [Fatigue] : no fatigue [Hot Flashes] : no hot flashes [Night Sweats] : no night sweats [Recent Change In Weight] : ~T no recent weight change [Discharge] : no discharge [Pain] : no pain [Redness] : no redness [Dryness] : no dryness [Vision Problems] : no vision problems [Itching] : no itching [Earache] : no earache [Hearing Loss] : no hearing loss [Nosebleeds] : no nosebleeds [Postnasal Drip] : postnasal drip [Nasal Discharge] : nasal discharge [Sore Throat] : no sore throat [Hoarseness] : no hoarseness [Chest Pain] : no chest pain [Palpitations] : no palpitations [Claudication] : no  leg claudication [Lower Ext Edema] : no lower extremity edema [Orthopena] : no orthopnea [Paroxysmal Nocturnal Dyspnea] : no paroxysmal nocturnal dyspnea [Shortness Of Breath] : no shortness of breath [Wheezing] : no wheezing [Cough] : no cough [Dyspnea on Exertion] : not dyspnea on exertion [Abdominal Pain] : no abdominal pain [Nausea] : no nausea [Constipation] : no constipation [Diarrhea] : no diarrhea [Vomiting] : no vomiting [Heartburn] : heartburn [Melena] : no melena [Dysuria] : no dysuria [Incontinence] : no incontinence [Hesitancy] : no hesitancy [Nocturia] : nocturia [Hematuria] : no hematuria [Frequency] : no frequency [Impotence] : impotence [Poor Libido] : poor libido [Joint Pain] : joint pain [Joint Stiffness] : joint stiffness [Muscle Pain] : muscle pain [Muscle Weakness] : no muscle weakness [Back Pain] : no back pain [Joint Swelling] : no joint swelling [Itching] : no itching [Mole Changes] : no mole changes [Nail Changes] : no nail changes [Hair Changes] : no hair changes [Skin Rash] : skin rash [Headache] : no headache [Dizziness] : no dizziness [Fainting] : no fainting [Confusion] : no confusion [Unsteady Walk] : no ataxia [Memory Loss] : no memory loss [Suicidal] : not suicidal [Insomnia] : no insomnia [Anxiety] : anxiety [Depression] : no depression [Easy Bleeding] : no easy bleeding [Easy Bruising] : no easy bruising [Swollen Glands] : no swollen glands [FreeTextEntry9] : left and right knee

## 2022-06-06 RX ORDER — BUDESONIDE AND FORMOTEROL FUMARATE DIHYDRATE 160; 4.5 UG/1; UG/1
160-4.5 AEROSOL RESPIRATORY (INHALATION) TWICE DAILY
Qty: 1 | Refills: 5 | Status: ACTIVE | COMMUNITY
Start: 2017-09-06 | End: 1900-01-01

## 2022-06-10 NOTE — REASON FOR VISIT
[Follow-Up Visit] : a follow-up visit for [Knee Pain] : knee pain [FreeTextEntry2] : Right knee pain

## 2022-06-10 NOTE — DISCUSSION/SUMMARY
[de-identified] : 67 year old male right knee revision on 11/21/2020.  Patient is doing well, but he is having some pain on the inside of the knee.  He has chronic patella subluxation.  Continue knee brace. Continue home exercises ROM and quad strengthening. He understands and agrees with the plan.\par \par Scheduled Loss of Use: 55 %

## 2022-06-10 NOTE — ADDENDUM
[FreeTextEntry1] : I, Junaid Pratt, acted solely as a scribe for Dr. Denton Thomas MD on this date 05/10/2022  .\par  \par All medical record entries made by the Scribe were at my, Dr. Denton Thomas MD , direction and personally dictated by me on 05/10/2022 . I have reviewed the chart and agree that the record accurately reflects my personal performance of the history, physical exam, assessment and plan. I have also personally directed, reviewed, and agreed with the chart.

## 2022-06-10 NOTE — PHYSICAL EXAM
[Antalgic] : antalgic [Normal RUE] : Right Upper Extremity: No scars, rashes, lesions, ulcers, skin intact [Normal Finger/nose] : finger to nose coordination [Normal Touch] : sensation intact for touch [Normal] : no peripheral adenopathy appreciated [Poor Appearance] : well-appearing [de-identified] : Patient appears stated age in no acute respiratory distress. Patient is alert oriented x3. Patient has normal mood and affect. \par \par Right knee exam\par There is minimal to moderate effusion. Range of motion is 0-120°. Painful at the extremes of range of motion. \par There is medial and lateral joint line tenderness. \par Quadriceps strength is 4/5. There is some muscle loss in the quadriceps. \par Anteroposterior and mediolateral stability is intact Joaquin test is negative. Alignment of the knee is in 5° of varus.\par Scars completely healed\par \par Left knee exam\par Range of motion of the knee is 0-120°. \par Skin is normal.  No rash.\par There is no effusion. No medial or lateral joint line tenderness. No swelling, no pitting edema.\par Overall alignment of the knee is then slight varus. Good anterior posterior stability. Firm endpoints on anterior and posterior drawer. \par Medial lateral stability is intact. Firm endpoint on medial and lateral stress testing .\par Joaquin test is negative.\par Quadriceps strength 5/ 5. There is no loss of muscle volume in the thigh. \par Good anterior posterior and mediolateral stability.\par Sensation in the extremities intact. \par Discrimination is intact. Good DP and PT pulses.\par \par Bilateral hip exam\par On inspection of the hip shows skin is normal. No evidence of rash. \par No loss of muscle.  Abductor strength is 5 out of 5. Hip flexor strength is 5.\par Range of motion of the hip at 90° flexion internal rotation is 15° external rotation is 30° pain-free. \par Hip has good stability in anterior and posterior direction. \par On lateral decubitus  examination there is no tenderness in the greater trochanter. \par Lower Extremity Examination \par Bilateral lower extremity skin is normal. There is no rash. There is no edema and lymphadenopathy.  DP and PT pulses intact. Sensation is intact. [de-identified] : X-rays of the right knee 2 view shows knee replacement in good alignment with no evidence of component loosening.

## 2022-06-10 NOTE — HISTORY OF PRESENT ILLNESS
[de-identified] : Patient presents to office walking on his own for yearly follow up, s/p right TKR on 11/21/2020, report knee has not improved since last visit,  swelling, instability, weakness and buckling. Patient complaints of tenderness indicated to anterior aspect of knee, 4/10, intermittent, achy in nature.  Takes Tylenol for pain. \par

## 2022-06-14 ENCOUNTER — NON-APPOINTMENT (OUTPATIENT)
Age: 67
End: 2022-06-14

## 2022-06-14 ENCOUNTER — APPOINTMENT (OUTPATIENT)
Dept: CARDIOLOGY | Facility: CLINIC | Age: 67
End: 2022-06-14
Payer: MEDICARE

## 2022-06-14 VITALS — HEART RATE: 88 BPM | SYSTOLIC BLOOD PRESSURE: 120 MMHG | DIASTOLIC BLOOD PRESSURE: 70 MMHG

## 2022-06-14 VITALS
RESPIRATION RATE: 17 BRPM | WEIGHT: 228 LBS | HEART RATE: 90 BPM | BODY MASS INDEX: 33.77 KG/M2 | TEMPERATURE: 97.6 F | OXYGEN SATURATION: 97 % | HEIGHT: 69 IN

## 2022-06-14 PROCEDURE — 99214 OFFICE O/P EST MOD 30 MIN: CPT

## 2022-06-14 PROCEDURE — 93000 ELECTROCARDIOGRAM COMPLETE: CPT

## 2022-06-14 NOTE — ASSESSMENT
[FreeTextEntry1] : Impression:\par 1. Hypertension well controlled\par 2. History of dyslipidemia at goal on statn at last check\par 3. Minimal coronary disease\par 4.Mild to moderate AS mild AI\par \par Plan:\par 1. Continue current medical regimen\par

## 2022-06-14 NOTE — REASON FOR VISIT
[Hypertension] : hypertension [FreeTextEntry1] : Returns for followup. He has been seeing his primary physician and is having some issues with cough. He was placed on methylprednisolone which he states he had trouble tolerating. He got dizzy and leaves it was due to elevations in his blood pressure. He has since come off this but is now getting cough once again. He denies any chest discomfort shortness of breath palpitations dizziness or syncope.

## 2022-06-20 ENCOUNTER — RX RENEWAL (OUTPATIENT)
Age: 67
End: 2022-06-20

## 2022-06-28 NOTE — DISCUSSION/SUMMARY
[de-identified] : A prescription for Physical Therapy was provided. A request was sent to workers compensation. \par \par A home exercise sheet was given and discussed with the patient to follow. Continue program to strengthen lower extremity.\par \par I have recommended utilizing a knee sleeve to provide added support and stability. \par \par Paperwork for work was completed in clinic today. Patient was cleared to to return to work with accommodations limiting schedule to 4 hours per day. \par \par FU 6 weeks.  [N] : Patient is not sexually active [Y] : Positive pregnancy history [LMPDate] : 06/10/2022 [MensesLength] : 10 [PGxTotal] : 1 [PGHxFullTerm] : 0 [PGHxPremature] : 0 [PGHxAbortions] : 0 [facility] [PGHxABInduced] : 0 [PGHxABSpont] : 0 [PGHxEctopic] : 0 [PGHxMultBirths] : 0 [FreeTextEntry1] : 49 yo with enlarged uterus, myomata, likely adenomyosis presents for surgical consult. Celia has heavy irregular cycles. She has anemia which she has managed with iron but she doesn’t take it consistently due to constipation. She is here to discuss hysterectomy.

## 2022-07-25 ENCOUNTER — RX RENEWAL (OUTPATIENT)
Age: 67
End: 2022-07-25

## 2022-07-26 ENCOUNTER — NON-APPOINTMENT (OUTPATIENT)
Age: 67
End: 2022-07-26

## 2022-07-27 ENCOUNTER — EMERGENCY (EMERGENCY)
Facility: HOSPITAL | Age: 67
LOS: 1 days | Discharge: HOME | End: 2022-07-27
Attending: EMERGENCY MEDICINE | Admitting: EMERGENCY MEDICINE
Payer: MEDICARE

## 2022-07-27 VITALS
SYSTOLIC BLOOD PRESSURE: 126 MMHG | HEART RATE: 79 BPM | RESPIRATION RATE: 16 BRPM | OXYGEN SATURATION: 99 % | WEIGHT: 225.09 LBS | TEMPERATURE: 97 F | HEIGHT: 69 IN | DIASTOLIC BLOOD PRESSURE: 74 MMHG

## 2022-07-27 DIAGNOSIS — Z98.890 OTHER SPECIFIED POSTPROCEDURAL STATES: Chronic | ICD-10-CM

## 2022-07-27 DIAGNOSIS — Z96.651 PRESENCE OF RIGHT ARTIFICIAL KNEE JOINT: Chronic | ICD-10-CM

## 2022-07-27 DIAGNOSIS — Z90.49 ACQUIRED ABSENCE OF OTHER SPECIFIED PARTS OF DIGESTIVE TRACT: Chronic | ICD-10-CM

## 2022-07-27 LAB
ALBUMIN SERPL ELPH-MCNC: 3.6 G/DL — SIGNIFICANT CHANGE UP (ref 3.3–5)
ALP SERPL-CCNC: 128 U/L — HIGH (ref 40–120)
ALT FLD-CCNC: 33 U/L — SIGNIFICANT CHANGE UP (ref 10–45)
ANION GAP SERPL CALC-SCNC: 12 MMOL/L — SIGNIFICANT CHANGE UP (ref 5–17)
AST SERPL-CCNC: 21 U/L — SIGNIFICANT CHANGE UP (ref 10–40)
BASOPHILS # BLD AUTO: 0.04 K/UL — SIGNIFICANT CHANGE UP (ref 0–0.2)
BASOPHILS NFR BLD AUTO: 0.4 % — SIGNIFICANT CHANGE UP (ref 0–2)
BILIRUB SERPL-MCNC: 1.6 MG/DL — HIGH (ref 0.2–1.2)
BUN SERPL-MCNC: 17 MG/DL — SIGNIFICANT CHANGE UP (ref 7–23)
CALCIUM SERPL-MCNC: 9 MG/DL — SIGNIFICANT CHANGE UP (ref 8.4–10.5)
CHLORIDE SERPL-SCNC: 102 MMOL/L — SIGNIFICANT CHANGE UP (ref 96–108)
CO2 SERPL-SCNC: 28 MMOL/L — SIGNIFICANT CHANGE UP (ref 22–31)
CREAT SERPL-MCNC: 1.2 MG/DL — SIGNIFICANT CHANGE UP (ref 0.5–1.3)
D DIMER BLD IA.RAPID-MCNC: 309 NG/ML DDU — HIGH
EGFR: 66 ML/MIN/1.73M2 — SIGNIFICANT CHANGE UP
EOSINOPHIL # BLD AUTO: 0.21 K/UL — SIGNIFICANT CHANGE UP (ref 0–0.5)
EOSINOPHIL NFR BLD AUTO: 2 % — SIGNIFICANT CHANGE UP (ref 0–6)
GLUCOSE SERPL-MCNC: 133 MG/DL — HIGH (ref 70–99)
HCT VFR BLD CALC: 40.3 % — SIGNIFICANT CHANGE UP (ref 39–50)
HGB BLD-MCNC: 13.7 G/DL — SIGNIFICANT CHANGE UP (ref 13–17)
IMM GRANULOCYTES NFR BLD AUTO: 0.3 % — SIGNIFICANT CHANGE UP (ref 0–1.5)
LYMPHOCYTES # BLD AUTO: 1.96 K/UL — SIGNIFICANT CHANGE UP (ref 1–3.3)
LYMPHOCYTES # BLD AUTO: 18.3 % — SIGNIFICANT CHANGE UP (ref 13–44)
MAGNESIUM SERPL-MCNC: 1.9 MG/DL — SIGNIFICANT CHANGE UP (ref 1.6–2.6)
MCHC RBC-ENTMCNC: 27.8 PG — SIGNIFICANT CHANGE UP (ref 27–34)
MCHC RBC-ENTMCNC: 34 GM/DL — SIGNIFICANT CHANGE UP (ref 32–36)
MCV RBC AUTO: 81.9 FL — SIGNIFICANT CHANGE UP (ref 80–100)
MONOCYTES # BLD AUTO: 0.59 K/UL — SIGNIFICANT CHANGE UP (ref 0–0.9)
MONOCYTES NFR BLD AUTO: 5.5 % — SIGNIFICANT CHANGE UP (ref 2–14)
NEUTROPHILS # BLD AUTO: 7.9 K/UL — HIGH (ref 1.8–7.4)
NEUTROPHILS NFR BLD AUTO: 73.5 % — SIGNIFICANT CHANGE UP (ref 43–77)
NRBC # BLD: 0 /100 WBCS — SIGNIFICANT CHANGE UP (ref 0–0)
NT-PROBNP SERPL-SCNC: 94 PG/ML — SIGNIFICANT CHANGE UP (ref 0–300)
PLATELET # BLD AUTO: 346 K/UL — SIGNIFICANT CHANGE UP (ref 150–400)
POTASSIUM SERPL-MCNC: 3.1 MMOL/L — LOW (ref 3.5–5.3)
POTASSIUM SERPL-SCNC: 3.1 MMOL/L — LOW (ref 3.5–5.3)
PROT SERPL-MCNC: 7.7 G/DL — SIGNIFICANT CHANGE UP (ref 6–8.3)
RBC # BLD: 4.92 M/UL — SIGNIFICANT CHANGE UP (ref 4.2–5.8)
RBC # FLD: 15.3 % — HIGH (ref 10.3–14.5)
SARS-COV-2 RNA SPEC QL NAA+PROBE: SIGNIFICANT CHANGE UP
SODIUM SERPL-SCNC: 142 MMOL/L — SIGNIFICANT CHANGE UP (ref 135–145)
TROPONIN I, HIGH SENSITIVITY RESULT: 22.6 NG/L — SIGNIFICANT CHANGE UP
TROPONIN I, HIGH SENSITIVITY RESULT: 24.1 NG/L — SIGNIFICANT CHANGE UP
WBC # BLD: 10.73 K/UL — HIGH (ref 3.8–10.5)
WBC # FLD AUTO: 10.73 K/UL — HIGH (ref 3.8–10.5)

## 2022-07-27 PROCEDURE — 85025 COMPLETE CBC W/AUTO DIFF WBC: CPT

## 2022-07-27 PROCEDURE — 96361 HYDRATE IV INFUSION ADD-ON: CPT

## 2022-07-27 PROCEDURE — 83880 ASSAY OF NATRIURETIC PEPTIDE: CPT

## 2022-07-27 PROCEDURE — 71275 CT ANGIOGRAPHY CHEST: CPT | Mod: 26,MA

## 2022-07-27 PROCEDURE — 80053 COMPREHEN METABOLIC PANEL: CPT

## 2022-07-27 PROCEDURE — 99285 EMERGENCY DEPT VISIT HI MDM: CPT | Mod: 25

## 2022-07-27 PROCEDURE — 93010 ELECTROCARDIOGRAM REPORT: CPT

## 2022-07-27 PROCEDURE — 85379 FIBRIN DEGRADATION QUANT: CPT

## 2022-07-27 PROCEDURE — 71045 X-RAY EXAM CHEST 1 VIEW: CPT

## 2022-07-27 PROCEDURE — 71275 CT ANGIOGRAPHY CHEST: CPT | Mod: MA

## 2022-07-27 PROCEDURE — 93005 ELECTROCARDIOGRAM TRACING: CPT

## 2022-07-27 PROCEDURE — 36415 COLL VENOUS BLD VENIPUNCTURE: CPT

## 2022-07-27 PROCEDURE — 74174 CTA ABD&PLVS W/CONTRAST: CPT | Mod: 26,MA

## 2022-07-27 PROCEDURE — 83735 ASSAY OF MAGNESIUM: CPT

## 2022-07-27 PROCEDURE — 87635 SARS-COV-2 COVID-19 AMP PRB: CPT

## 2022-07-27 PROCEDURE — 84484 ASSAY OF TROPONIN QUANT: CPT

## 2022-07-27 PROCEDURE — 71045 X-RAY EXAM CHEST 1 VIEW: CPT | Mod: 26

## 2022-07-27 PROCEDURE — 96374 THER/PROPH/DIAG INJ IV PUSH: CPT | Mod: XU

## 2022-07-27 PROCEDURE — 74174 CTA ABD&PLVS W/CONTRAST: CPT | Mod: MA

## 2022-07-27 PROCEDURE — 99285 EMERGENCY DEPT VISIT HI MDM: CPT

## 2022-07-27 PROCEDURE — 82962 GLUCOSE BLOOD TEST: CPT

## 2022-07-27 RX ORDER — SODIUM CHLORIDE 9 MG/ML
500 INJECTION INTRAMUSCULAR; INTRAVENOUS; SUBCUTANEOUS ONCE
Refills: 0 | Status: COMPLETED | OUTPATIENT
Start: 2022-07-27 | End: 2022-07-27

## 2022-07-27 RX ORDER — POTASSIUM CHLORIDE 20 MEQ
40 PACKET (EA) ORAL ONCE
Refills: 0 | Status: COMPLETED | OUTPATIENT
Start: 2022-07-27 | End: 2022-07-27

## 2022-07-27 RX ORDER — ONDANSETRON 8 MG/1
4 TABLET, FILM COATED ORAL ONCE
Refills: 0 | Status: COMPLETED | OUTPATIENT
Start: 2022-07-27 | End: 2022-07-27

## 2022-07-27 RX ADMIN — SODIUM CHLORIDE 1000 MILLILITER(S): 9 INJECTION INTRAMUSCULAR; INTRAVENOUS; SUBCUTANEOUS at 15:07

## 2022-07-27 RX ADMIN — ONDANSETRON 4 MILLIGRAM(S): 8 TABLET, FILM COATED ORAL at 15:01

## 2022-07-27 RX ADMIN — Medication 40 MILLIEQUIVALENT(S): at 15:56

## 2022-07-27 NOTE — ED PROVIDER NOTE - NSFOLLOWUPINSTRUCTIONS_ED_ALL_ED_FT
Please follow-up with your doctor(s) within the next 3 days, but see medical sooner if your symptoms persist or worsen.  Please call tomorrow for an appointment.    CT shows: Dilated ascending thoracic aorta measuring 4 cm. Follow up with your cardiologist.  You were given a copy of your labs and/or imaging.  Please go-over these with your doctor(s).     If you have any worsening of symptoms or any other concerns please see your doctor or return to the ED immediately.    Please continue taking your home medications as directed.  Do not use alcohol when taking any medication (especially antibiotics, tylenol or other pain medication) unless you check with the doctor or pharmacist.

## 2022-07-27 NOTE — ED ADULT NURSE NOTE - OBJECTIVE STATEMENT
Assumed pt care for a 67 yr old male alert and oriented x4, complaining of a near syncopal event. Pt reports he was in line on a bagel place and as he was waiting he became very dizzy, his vision began to darken and he hearing was muffled. He also became very sweaty, bystanders sat him down and called EMS. Pt denies any current dizziness, abdominal pain, palpitations, chest pain and dyspnea before and after event. Denies any recent medication change or drug use, sick contact or LOC. Pt placed in POC. IV established, safety measures kept. Awaiting further disposition.

## 2022-07-27 NOTE — ED PROVIDER NOTE - PHYSICAL EXAMINATION
General:     NAD   Eyes: PERRL  Head:     NC/AT, dry oral mucosa  Neck:     trachea midline  Lungs:     CTA b/l  CVS:     RRR, +murmur  Abd:     +BS, s/nt/nd  Ext:   no deformities   Neuro: AAOx3, no sensory/motor deficits

## 2022-07-27 NOTE — ED PROVIDER NOTE - ATTENDING APP SHARED VISIT CONTRIBUTION OF CARE
Eval with ANNIKA López. 67 yr old male alert and oriented x4, complaining of a near syncopal event. Pt reports he was in line on a bagel place and as he was waiting he became very dizzy, his vision began to darken and he hearing was muffled. He also became very sweaty, bystanders sat him down and called EMS. Pt denies any current dizziness, abdominal pain, palpitations, chest pain and dyspnea before and after event. Denies any recent medication change or drug use, sick contact or LOC.  trops stable. cta no acute findings. pt back to baseline.   Discussed with patient need to return to ED if symptoms don't continue to improve or recur or develops any new or worsening symptoms that are of concern.    I performed a face to face bedside interview with patient regarding history of present illness, review of symptoms and past medical history. I completed an independent physical exam.  I have discussed the patient's plan of care with Physician Assistant (PA). I agree with note as stated above, having amended the EMR as needed to reflect my findings.   This includes History of Present Illness, HIV, Past Medical/Surgical/Family/Social History, Allergies and Home Medications, Review of Systems, Physical Exam, and any Progress Notes during the time I functioned as the attending physician for this patient.

## 2022-07-27 NOTE — ED PROVIDER NOTE - OBJECTIVE STATEMENT
67 yr old male complaining of a near syncopal event. Pt reports he was in line on a bagel place and as he was waiting he became very dizzy, his vision began to darken and he hearing was muffled. He also became very sweaty, bystanders sat him down and called EMS. Pt denies any current dizziness, abdominal pain, palpitations, chest pain and dyspnea before and after event. Denies any recent medication change or drug use, sick contact or LOC.

## 2022-07-27 NOTE — ED PROVIDER NOTE - PATIENT PORTAL LINK FT
You can access the FollowMyHealth Patient Portal offered by [facility] by registering at the following website: [email or web address]. By joining [employer]’s FollowMyHealth portal, you will also be able to view your health information using other applications (apps) compatible with our system.

## 2022-07-27 NOTE — ED PROVIDER NOTE - CLINICAL SUMMARY MEDICAL DECISION MAKING FREE TEXT BOX
67 yr old male alert and oriented x4, complaining of a near syncopal event. Pt reports he was in line on a bagel place and as he was waiting he became very dizzy, his vision began to darken and he hearing was muffled. He also became very sweaty, bystanders sat him down and called EMS. Pt denies any current dizziness, abdominal pain, palpitations, chest pain and dyspnea before and after event. Denies any recent medication change or drug use, sick contact or LOC.  trops stable. cta no acute findings. pt back to baseline  Discussed with patient need to return to ED if symptoms don't continue to improve or recur or develops any new or worsening symptoms that are of concern.

## 2022-07-27 NOTE — ED PROVIDER NOTE - NS ED ATTENDING STATEMENT MOD
This was a shared visit with the SILVER. I reviewed and verified the documentation and independently performed the documented:

## 2022-07-27 NOTE — ED ADULT NURSE NOTE - BEFORE THE ILLNESS OR INJURY
Internal Medicine Progress note       Patient: Lety Berger Date:2018     : 1942 Attending: Tay Holt MD   76 year old male 2018          CC:     Shortness of breath     Recent Events/Subjective:     Feeling well  No shortness of breath  Denies chest pain  Some cough  No abd complaints  Tolerating po diet  No other events  SW following for DC             Vital Last Value 24 Hour Range   Temperature 97.5 °F (36.4 °C) Temp  Min: 96 °F (35.6 °C)  Max: 97.9 °F (36.6 °C)   Pulse 84 Pulse  Min: 60  Max: 84   Respiratory 18 Resp  Min: 18  Max: 19   Blood Pressure 112/58 BP  Min: 112/58  Max: 131/69   Arterial BP   No Data Recorded   O2 Sat 1 L/min NA   Pulse Oximetry 93 % SpO2  Min: 93 %  Max: 97 %     Vital Today Admitted   Weight 89.1 kg Weight: 91.4 kg   BMI N/A BMI (Calculated): 32.59          Last I/O 3 shifts  I/O last 3 completed shifts:  In: 960 [P.O.:960]  Out: 1300 [Urine:1300]    Physical Exam:      General appearance: NAD, resting in bed,   Head: Normocephalic,  Atraumatic, no obvious abnormalities   Eyes: PERRL, no drainage, exudates or erythema   Neck: supple, full ROM, no JVD, trachea midline,   Lungs: normal efforts, diminished RLL  Heart: RRR and S1, S2 normal, no murmur, gallop or rub   Abdomen: soft, NT/ND, normal BT, no organomegaly    Extremities: no edema/cyanosis, warm, no cyanosis or clubbing   Pulses: 2+ and symmetric radial, dorsalis pedis   Skin: WDI, no rashes, lesions or wounds    Neurologic: AAOx3, [place], no focal motor weakness,        Labs  Recent Labs   Lab 18   WBC 11.0  --   --   --    HGB 10.6*  --   --   --    HCT 34.1*  --   --   --      --   --   --    SEG 64  --   --   --    SODIUM  --  136 138 137   POTASSIUM  --  3.9 4.0 4.1   CHLORIDE  --  105 104 104   CO2  --  26 28 28   ANIONGAP  --  9* 10 9*   BUN  --  28* 36* 39*   CREATININE  --  1.14 1.12 1.17   GFRNA  --  62 63 60   GFRA  --  72 74  70   GLUCOSE  --  152* 141* 139*   CALCIUM  --  8.2* 8.3* 8.3*         Medications/Infusions:       Scheduled:   • clopidogrel  75 mg Oral Daily   • insulin glargine  15 Units Subcutaneous Nightly   • amLODIPine  10 mg Oral Daily   • aspirin  81 mg Oral Daily   • atorvastatin  40 mg Oral Daily   • tamsulosin  0.4 mg Oral Daily   • metoPROLOL tartrate  25 mg Oral 2 times per day   • lisinopril  40 mg Oral Daily   • linaGLIPtin  5 mg Oral Daily   • linaclotide  145 mcg Oral QAM AC   • gabapentin  200 mg Oral TID   • donepezil  10 mg Oral Daily   • sodium chloride (PF)  2 mL Injection 2 times per day   • enoxaparin (LOVENOX) injection  40 mg Subcutaneous Daily   • insulin lispro   Subcutaneous TID AC       Continuous Infusions:   • dextrose 5 % infusion       .         Radiology/Imaging: reviewed              Assessment:     Asthma exacerbation  Diastolic heart failure-no real exacerbation  DM 2  HTN  Pulmonary nodules s/p Biopsy with PTX      Plan & Recommendations:   Monitor pulm status  pulm follow up prn  Supportive Rx  plavix resumed   LMWH   DC planning per BERTHA Trinidad NP  12/1/2018  Wants to be placed  I saw and evaluated the patient personally. I performed the history, exam and medical decision making & reviewed all radiology/cardiac & lab data for this encounter and agree with the above note & updated     Yes

## 2022-07-29 ENCOUNTER — APPOINTMENT (OUTPATIENT)
Dept: INTERNAL MEDICINE | Facility: CLINIC | Age: 67
End: 2022-07-29

## 2022-07-29 VITALS
RESPIRATION RATE: 18 BRPM | HEART RATE: 85 BPM | BODY MASS INDEX: 33.62 KG/M2 | HEIGHT: 69 IN | TEMPERATURE: 97.8 F | WEIGHT: 227 LBS | DIASTOLIC BLOOD PRESSURE: 62 MMHG | SYSTOLIC BLOOD PRESSURE: 122 MMHG | OXYGEN SATURATION: 98 %

## 2022-07-29 DIAGNOSIS — R42 DIZZINESS AND GIDDINESS: ICD-10-CM

## 2022-07-29 DIAGNOSIS — I63.9 CEREBRAL INFARCTION, UNSPECIFIED: ICD-10-CM

## 2022-07-29 DIAGNOSIS — C67.9 MALIGNANT NEOPLASM OF BLADDER, UNSPECIFIED: ICD-10-CM

## 2022-07-29 DIAGNOSIS — K21.9 GASTRO-ESOPHAGEAL REFLUX DISEASE W/OUT ESOPHAGITIS: ICD-10-CM

## 2022-07-29 PROCEDURE — 99215 OFFICE O/P EST HI 40 MIN: CPT

## 2022-07-29 RX ORDER — METHYLPREDNISOLONE 4 MG/1
4 TABLET ORAL
Qty: 1 | Refills: 1 | Status: DISCONTINUED | COMMUNITY
Start: 2017-09-13 | End: 2022-07-29

## 2022-07-29 RX ORDER — AZITHROMYCIN 250 MG/1
250 TABLET, FILM COATED ORAL
Qty: 1 | Refills: 0 | Status: DISCONTINUED | COMMUNITY
Start: 2022-05-25 | End: 2022-07-29

## 2022-07-30 NOTE — CHART NOTE - NSCHARTNOTEFT_GEN_A_CORE
SW called pt to assess and assist with follow up care.  Pt is 66 y/o male presenting to ED  for dizziness.  Pt reports that he is feeling "totally normal".  Pt confirms that he has attended PCP appt on 7/29/22 with Dr. Robert Junior and Cardiology appt with Dr. Davison is scheduled on 8/1/22.  Pt denies any safety concerns or need for SW resources or assistance.  Pt is encouraged to call SW if further assistance is needed.

## 2022-08-01 ENCOUNTER — APPOINTMENT (OUTPATIENT)
Dept: CARDIOLOGY | Facility: CLINIC | Age: 67
End: 2022-08-01

## 2022-08-01 ENCOUNTER — NON-APPOINTMENT (OUTPATIENT)
Age: 67
End: 2022-08-01

## 2022-08-01 VITALS — DIASTOLIC BLOOD PRESSURE: 72 MMHG | HEART RATE: 76 BPM | SYSTOLIC BLOOD PRESSURE: 120 MMHG

## 2022-08-01 VITALS
HEART RATE: 77 BPM | OXYGEN SATURATION: 97 % | TEMPERATURE: 97.7 F | RESPIRATION RATE: 16 BRPM | WEIGHT: 228 LBS | HEIGHT: 69 IN | BODY MASS INDEX: 33.77 KG/M2

## 2022-08-01 DIAGNOSIS — Z00.00 ENCOUNTER FOR GENERAL ADULT MEDICAL EXAMINATION W/OUT ABNORMAL FINDINGS: ICD-10-CM

## 2022-08-01 PROCEDURE — 99214 OFFICE O/P EST MOD 30 MIN: CPT

## 2022-08-01 PROCEDURE — 93000 ELECTROCARDIOGRAM COMPLETE: CPT

## 2022-08-01 NOTE — REVIEW OF SYSTEMS
[Negative] : Heme/Lymph [FreeTextEntry2] : see hpi [FreeTextEntry5] : see hpi [FreeTextEntry6] : s [de-identified] : see hpi

## 2022-08-01 NOTE — REASON FOR VISIT
[Symptom and Test Evaluation] : symptom and test evaluation [FreeTextEntry1] : Patient returns for reevaluation. Apparently he has had several episodes beginning with diaphoresis and followed by some lightheadedness or visual difficulties. One occurred while in the [employer]. Another occurred on a ladder these. He was seen in the emergency room and nothing was found. He denies chest discomfort he has had no shortness of breath. He has seen his primary physician and advised to see a neurologist. He was able to mow the lawn over the weekend and had no difficulty whatsoever.

## 2022-08-01 NOTE — ASSESSMENT
[FreeTextEntry1] : Impression:\par 1. Hypertension well controlled\par 2. History of dyslipidemia at goal on statn at last check\par 3. Minimal coronary disease\par 4.Mild to moderate AS mild AI\par \par Plan:\par 1. Current symptom complex of uncertain etiology. Have concurred with Dr. Sanchez the patient should have neurologic evaluation due to blurry vision.\par

## 2022-08-02 NOTE — PHYSICAL THERAPY INITIAL EVALUATION ADULT - MD/RN NOTIFIED
yes Area H Indication Text: Tumors in this location are included in Area H (eyelids, eyebrows, nose, lips, chin, ear, pre-auricular, post-auricular, temple, genitalia, hands, feet, ankles and areola).  Tissue conservation is critical in these anatomic locations.

## 2022-08-06 PROBLEM — K21.9 GERD (GASTROESOPHAGEAL REFLUX DISEASE): Status: ACTIVE | Noted: 2020-05-01

## 2022-08-06 PROBLEM — I63.9 CRYPTOGENIC STROKE: Status: ACTIVE | Noted: 2019-03-05

## 2022-08-06 PROBLEM — R42 EPISODIC LIGHTHEADEDNESS: Status: ACTIVE | Noted: 2022-08-06

## 2022-08-06 NOTE — PHYSICAL EXAM
[No Acute Distress] : no acute distress [Well Nourished] : well nourished [Well Developed] : well developed [Well-Appearing] : well-appearing [Normal Voice/Communication] : normal voice/communication [Normal Sclera/Conjunctiva] : normal sclera/conjunctiva [PERRL] : pupils equal round and reactive to light [EOMI] : extraocular movements intact [Normal Outer Ear/Nose] : the outer ears and nose were normal in appearance [Normal Oropharynx] : the oropharynx was normal [Normal TMs] : both tympanic membranes were normal [Normal Nasal Mucosa] : the nasal mucosa was normal [No JVD] : no jugular venous distention [No Lymphadenopathy] : no lymphadenopathy [Supple] : supple [Thyroid Normal, No Nodules] : the thyroid was normal and there were no nodules present [No Respiratory Distress] : no respiratory distress  [No Accessory Muscle Use] : no accessory muscle use [Clear to Auscultation] : lungs were clear to auscultation bilaterally [Normal Percussion] : the chest was normal to percussion [Normal Rate] : normal rate  [Regular Rhythm] : with a regular rhythm [Normal S1, S2] : normal S1 and S2 [No Carotid Bruits] : no carotid bruits [No Abdominal Bruit] : a ~M bruit was not heard ~T in the abdomen [No Varicosities] : no varicosities [Pedal Pulses Present] : the pedal pulses are present [No Edema] : there was no peripheral edema [No Palpable Aorta] : no palpable aorta [No Extremity Clubbing/Cyanosis] : no extremity clubbing/cyanosis [Declined Breast Exam] : declined breast exam  [Soft] : abdomen soft [Non Tender] : non-tender [Non-distended] : non-distended [No Masses] : no abdominal mass palpated [No HSM] : no HSM [Normal Bowel Sounds] : normal bowel sounds [No Hernias] : no hernias [Declined Rectal Exam] : declined rectal exam [Normal Supraclavicular Nodes] : no supraclavicular lymphadenopathy [Normal Axillary Nodes] : no axillary lymphadenopathy [Normal Posterior Cervical Nodes] : no posterior cervical lymphadenopathy [Normal Anterior Cervical Nodes] : no anterior cervical lymphadenopathy [Normal Inguinal Nodes] : no inguinal lymphadenopathy [Normal Femoral Nodes] : no femoral lymphadenopathy [No CVA Tenderness] : no CVA  tenderness [No Spinal Tenderness] : no spinal tenderness [Kyphosis] : no kyphosis [Scoliosis] : no scoliosis [No Joint Swelling] : no joint swelling [Grossly Normal Strength/Tone] : grossly normal strength/tone [No Rash] : no rash [Acne] : no acne [Coordination Grossly Intact] : coordination grossly intact [No Focal Deficits] : no focal deficits [Normal Gait] : normal gait [Deep Tendon Reflexes (DTR)] : deep tendon reflexes were 2+ and symmetric [Speech Grossly Normal] : speech grossly normal [Memory Grossly Normal] : memory grossly normal [Normal Affect] : the affect was normal [Alert and Oriented x3] : oriented to person, place, and time [Normal Mood] : the mood was normal [Normal Insight/Judgement] : insight and judgment were intact [de-identified] : 1/6 systolic [de-identified] : left hand swollen

## 2022-08-06 NOTE — HEALTH RISK ASSESSMENT
[Never] : Never [Yes] : Yes [No falls in past year] : Patient reported no falls in the past year [0] : 2) Feeling down, depressed, or hopeless: Not at all (0) [PHQ-2 Negative - No further assessment needed] : PHQ-2 Negative - No further assessment needed [de-identified] : social [[id number]] : 0

## 2022-08-06 NOTE — HEALTH RISK ASSESSMENT
[Never] : Never [Yes] : Yes [No falls in past year] : Patient reported no falls in the past year [0] : 2) Feeling down, depressed, or hopeless: Not at all (0) [PHQ-2 Negative - No further assessment needed] : PHQ-2 Negative - No further assessment needed [de-identified] : social [[id number]] : 0

## 2022-08-06 NOTE — PHYSICAL EXAM
[No Acute Distress] : no acute distress [Well Nourished] : well nourished [Well Developed] : well developed [Well-Appearing] : well-appearing [Normal Voice/Communication] : normal voice/communication [Normal Sclera/Conjunctiva] : normal sclera/conjunctiva [PERRL] : pupils equal round and reactive to light [EOMI] : extraocular movements intact [Normal Outer Ear/Nose] : the outer ears and nose were normal in appearance [Normal Oropharynx] : the oropharynx was normal [Normal TMs] : both tympanic membranes were normal [Normal Nasal Mucosa] : the nasal mucosa was normal [No JVD] : no jugular venous distention [No Lymphadenopathy] : no lymphadenopathy [Supple] : supple [Thyroid Normal, No Nodules] : the thyroid was normal and there were no nodules present [No Respiratory Distress] : no respiratory distress  [No Accessory Muscle Use] : no accessory muscle use [Clear to Auscultation] : lungs were clear to auscultation bilaterally [Normal Percussion] : the chest was normal to percussion [Normal Rate] : normal rate  [Regular Rhythm] : with a regular rhythm [Normal S1, S2] : normal S1 and S2 [No Carotid Bruits] : no carotid bruits [No Abdominal Bruit] : a ~M bruit was not heard ~T in the abdomen [No Varicosities] : no varicosities [Pedal Pulses Present] : the pedal pulses are present [No Edema] : there was no peripheral edema [No Palpable Aorta] : no palpable aorta [No Extremity Clubbing/Cyanosis] : no extremity clubbing/cyanosis [Declined Breast Exam] : declined breast exam  [Soft] : abdomen soft [Non Tender] : non-tender [Non-distended] : non-distended [No Masses] : no abdominal mass palpated [No HSM] : no HSM [Normal Bowel Sounds] : normal bowel sounds [No Hernias] : no hernias [Declined Rectal Exam] : declined rectal exam [Normal Supraclavicular Nodes] : no supraclavicular lymphadenopathy [Normal Axillary Nodes] : no axillary lymphadenopathy [Normal Posterior Cervical Nodes] : no posterior cervical lymphadenopathy [Normal Anterior Cervical Nodes] : no anterior cervical lymphadenopathy [Normal Inguinal Nodes] : no inguinal lymphadenopathy [Normal Femoral Nodes] : no femoral lymphadenopathy [No CVA Tenderness] : no CVA  tenderness [No Spinal Tenderness] : no spinal tenderness [Kyphosis] : no kyphosis [Scoliosis] : no scoliosis [No Joint Swelling] : no joint swelling [Grossly Normal Strength/Tone] : grossly normal strength/tone [No Rash] : no rash [Acne] : no acne [Coordination Grossly Intact] : coordination grossly intact [No Focal Deficits] : no focal deficits [Normal Gait] : normal gait [Deep Tendon Reflexes (DTR)] : deep tendon reflexes were 2+ and symmetric [Speech Grossly Normal] : speech grossly normal [Memory Grossly Normal] : memory grossly normal [Normal Affect] : the affect was normal [Alert and Oriented x3] : oriented to person, place, and time [Normal Mood] : the mood was normal [Normal Insight/Judgement] : insight and judgment were intact [de-identified] : 1/6 systolic [de-identified] : left hand swollen

## 2022-08-06 NOTE — ASSESSMENT
[FreeTextEntry1] : Physical examination shows a well-developed man in no acute distress blood pressure 122/62 height 5 foot 9 inches weight 227 pounds BMI is 33.52 pulse was 85 respiratory rate of 18 oxygen saturation room air 98% HEENT was unremarkable chest was clear cardiovascular exam was regular with no extra heart sounds or murmurs abdomen was soft extremities showed no clubbing cyanosis or edema neurologic exam was nonfocal patient was referred to his cardiologist and given the name of a neurologist to check it over and see if further test are needed for these episodes he is having these appointments have been made we will obtain records including blood test and EKGs from the emergency room which were reported as unremarkable patient will return to the office if his situation returns otherwise he will come in for a comprehensive physical over the next several months he is up-to-date with his ophthalmology he does occasionally visit a dermatologist for cancer screening he has received 3 COVID-19 vaccines he was encouraged to get the Prevnar 20 the influenza and the Shingrix vaccines he will contact his gastroenterologist concerning the timing of his next colonoscopy he does follow with his orthopedist concerning his issues

## 2022-08-06 NOTE — HISTORY OF PRESENT ILLNESS
[FreeTextEntry1] : 67-year-old man comes to the office for follow-up to review his medications and discuss his overall health patient recently seen in the emergency room for several episodes of diaphoresis lightheadedness and visual difficulties [de-identified] : Comes to the office for follow-up with a history of hypertension bladder cancer degenerative stroke GERD hyperlipidemia nonrheumatic aortic valve stenosis osteoarthritis and status post total knee replacement was in the emergency room recently for episodic diaphoresis and lightheadedness with some visual difficulties claims that these have occurred more than once evaluated in the emergency room with nothing found he currently denies temperature chills sweats or myalgias he has had no headache sinus congestion sore throat cough wheezing pleurisy chest pain shortness of breath exertional dyspnea she had no vertigo or syncope denies abdominal pain has occasional heartburn without dysphagia has had no nausea vomiting diarrhea constipation bright red blood per rectum or black stools appetite has been good weight has been stable has occasional pain in his knees does get up at night to urinate but denies dysuria or gross hematuria has no edema recent skin rashes has bouts of anxiety but has been sleeping adequately patient has had no further episodes of diaphoresis\par

## 2022-08-06 NOTE — HISTORY OF PRESENT ILLNESS
[FreeTextEntry1] : 67-year-old man comes to the office for follow-up to review his medications and discuss his overall health patient recently seen in the emergency room for several episodes of diaphoresis lightheadedness and visual difficulties [de-identified] : Comes to the office for follow-up with a history of hypertension bladder cancer degenerative stroke GERD hyperlipidemia nonrheumatic aortic valve stenosis osteoarthritis and status post total knee replacement was in the emergency room recently for episodic diaphoresis and lightheadedness with some visual difficulties claims that these have occurred more than once evaluated in the emergency room with nothing found he currently denies temperature chills sweats or myalgias he has had no headache sinus congestion sore throat cough wheezing pleurisy chest pain shortness of breath exertional dyspnea she had no vertigo or syncope denies abdominal pain has occasional heartburn without dysphagia has had no nausea vomiting diarrhea constipation bright red blood per rectum or black stools appetite has been good weight has been stable has occasional pain in his knees does get up at night to urinate but denies dysuria or gross hematuria has no edema recent skin rashes has bouts of anxiety but has been sleeping adequately patient has had no further episodes of diaphoresis\par

## 2022-08-06 NOTE — REVIEW OF SYSTEMS
[Fever] : no fever [Chills] : no chills [Fatigue] : no fatigue [Hot Flashes] : no hot flashes [Night Sweats] : no night sweats [Recent Change In Weight] : ~T no recent weight change [Discharge] : no discharge [Pain] : no pain [Redness] : no redness [Dryness] : no dryness [Vision Problems] : no vision problems [Itching] : no itching [Earache] : no earache [Hearing Loss] : no hearing loss [Nosebleeds] : no nosebleeds [Postnasal Drip] : postnasal drip [Nasal Discharge] : nasal discharge [Sore Throat] : no sore throat [Hoarseness] : no hoarseness [Chest Pain] : no chest pain [Palpitations] : no palpitations [Claudication] : no  leg claudication [Lower Ext Edema] : no lower extremity edema [Orthopena] : no orthopnea [Paroxysmal Nocturnal Dyspnea] : no paroxysmal nocturnal dyspnea [Shortness Of Breath] : no shortness of breath [Wheezing] : no wheezing [Cough] : no cough [Dyspnea on Exertion] : not dyspnea on exertion [Abdominal Pain] : no abdominal pain [Nausea] : no nausea [Constipation] : no constipation [Diarrhea] : no diarrhea [Vomiting] : no vomiting [Heartburn] : heartburn [Melena] : no melena [Dysuria] : no dysuria [Incontinence] : no incontinence [Hesitancy] : no hesitancy [Nocturia] : nocturia [Hematuria] : no hematuria [Frequency] : no frequency [Impotence] : impotence [Poor Libido] : poor libido [Joint Pain] : joint pain [Joint Stiffness] : joint stiffness [Muscle Pain] : muscle pain [Muscle Weakness] : no muscle weakness [Back Pain] : no back pain [Joint Swelling] : no joint swelling [Itching] : no itching [Mole Changes] : no mole changes [Nail Changes] : no nail changes [Hair Changes] : no hair changes [Skin Rash] : no skin rash [Headache] : no headache [Dizziness] : dizziness [Fainting] : no fainting [Confusion] : no confusion [Unsteady Walk] : no ataxia [Memory Loss] : no memory loss [Suicidal] : not suicidal [Insomnia] : no insomnia [Anxiety] : anxiety [Depression] : no depression [Easy Bleeding] : no easy bleeding [Easy Bruising] : no easy bruising [Swollen Glands] : no swollen glands [FreeTextEntry9] : left and right knee [de-identified] : diaphoresis

## 2022-08-24 PROBLEM — M17.11 PRIMARY LOCALIZED OSTEOARTHRITIS OF RIGHT KNEE: Status: ACTIVE | Noted: 2019-06-13

## 2022-08-24 PROBLEM — M25.562 ACUTE PAIN OF LEFT KNEE: Status: ACTIVE | Noted: 2020-06-04

## 2022-08-24 PROBLEM — S83.241A TEAR OF MEDIAL MENISCUS OF RIGHT KNEE, CURRENT, UNSPECIFIED TEAR TYPE, INITIAL ENCOUNTER: Status: ACTIVE | Noted: 2019-07-11

## 2022-08-24 PROBLEM — S83.242D TEAR OF MEDIAL MENISCUS OF LEFT KNEE, CURRENT, UNSPECIFIED TEAR TYPE, SUBSEQUENT ENCOUNTER: Status: ACTIVE | Noted: 2020-09-10

## 2022-08-24 PROBLEM — M17.11 ARTHRITIS OF KNEE, RIGHT: Status: ACTIVE | Noted: 2019-06-13

## 2022-08-25 NOTE — PRE-OP CHECKLIST - BLOOD AVAILABLE
August 25, 2022      To Whom It May Concern:      Yakov Cantrell was seen in our Emergency Department today, 08/25/22.  I expect his condition to improve over the next 2-3 days.  He may return to work/school when improved.    Sincerely,        Jacquelyn DOHERTY RN         type and screen sent

## 2022-08-29 ENCOUNTER — RX RENEWAL (OUTPATIENT)
Age: 67
End: 2022-08-29

## 2022-09-20 ENCOUNTER — RX RENEWAL (OUTPATIENT)
Age: 67
End: 2022-09-20

## 2022-09-26 ENCOUNTER — APPOINTMENT (OUTPATIENT)
Dept: NEUROLOGY | Facility: CLINIC | Age: 67
End: 2022-09-26

## 2022-10-22 ENCOUNTER — RX RENEWAL (OUTPATIENT)
Age: 67
End: 2022-10-22

## 2022-11-01 NOTE — ASU PREOP CHECKLIST - SPO2 (%)
preop exam completed at this time  Patient with history of right total knee replacement returning for left total knee replacement with scheduled surgical date on 11/18/2022  Patient is medically optimized at this time, most recent blood work and EKG reviewed    No addition testing required at this time  He may discuss starting oral iron supplementation prior to surgery with his surgeons on Friday  Based on RCRI, there is a 0 4% risk of major adverse cardiac event  At this time patient is medically optimized for the upcoming surgery
99
rolling walker

## 2022-11-19 ENCOUNTER — RX RENEWAL (OUTPATIENT)
Age: 67
End: 2022-11-19

## 2022-12-14 ENCOUNTER — APPOINTMENT (OUTPATIENT)
Dept: CARDIOLOGY | Facility: CLINIC | Age: 67
End: 2022-12-14

## 2022-12-14 NOTE — PATIENT PROFILE ADULT - LIVING ENVIRONMENT
[id number]]   Admitting Physician: CARMEN PRINGLE [[id number]]   Attending Physician: CARMEN PRINGLE [[id number]]   Isolate for COVID?: No [0]   Certification: I Certify That Inpatient Hospital Services Are Medically Necessary For Greater  Than 2 Midnights               No follow-up provider specified.       Medication List      No changes were made to your prescriptions during this visit.        no

## 2023-01-17 ENCOUNTER — RX RENEWAL (OUTPATIENT)
Age: 68
End: 2023-01-17

## 2023-01-17 RX ORDER — MONTELUKAST 10 MG/1
10 TABLET, FILM COATED ORAL
Qty: 30 | Refills: 5 | Status: ACTIVE | COMMUNITY
Start: 2022-05-25 | End: 1900-01-01

## 2023-02-07 ENCOUNTER — RX RENEWAL (OUTPATIENT)
Age: 68
End: 2023-02-07

## 2023-02-22 NOTE — PRE-OP CHECKLIST - HAIR REMOVAL
Lab Results   Component Value Date    HGBA1C 8 7 (H) 01/26/2023       Recent Labs     02/21/23  0143 02/21/23  1438   POCGLU 86 94       Blood Sugar Average: Last 72 hrs:  · (P) 90   · Patient maintained on glyburide outpatient   · Bad experience with insulin in the past and refuses any at this time clipper

## 2023-03-03 ENCOUNTER — RX RENEWAL (OUTPATIENT)
Age: 68
End: 2023-03-03

## 2023-04-05 NOTE — ED PROVIDER NOTE - NSFOLLOWUPINSTRUCTIONS_ED_ALL_ED_FT
Patient/caregiver requests family/friend to interpret. Follow up your test results with Dr. Kyle, call to schedule an appointment     Take the prescribed medications as directed     Stay hydrated  Return to the ER if your symptoms worsen, high fevers, severe pain or for any other medical emergencies

## 2023-04-17 NOTE — ASU PREOP CHECKLIST - WAS PATIENT ON BETA BLOCKER?
Pain is not atypical and can be evidence of some residual inflammation.  However, I am happy to see the patient in the office for repeat exam and to discuss further treatment options for the popping.  She can schedule routine appointment at her convenience.  
Patient called following up on previous message.  Scheduled an appt   
Patient states no pain since the shot, but her right shoulder keeps popping, a lot. Please call her today at [phone] to advise.  
No

## 2023-05-04 ENCOUNTER — APPOINTMENT (OUTPATIENT)
Dept: ORTHOPEDIC SURGERY | Facility: CLINIC | Age: 68
End: 2023-05-04
Payer: OTHER MISCELLANEOUS

## 2023-05-04 VITALS — HEIGHT: 69 IN | BODY MASS INDEX: 33.77 KG/M2 | WEIGHT: 228 LBS

## 2023-05-04 PROCEDURE — 73562 X-RAY EXAM OF KNEE 3: CPT | Mod: RT

## 2023-05-04 PROCEDURE — 99214 OFFICE O/P EST MOD 30 MIN: CPT | Mod: 25

## 2023-05-04 PROCEDURE — 20610 DRAIN/INJ JOINT/BURSA W/O US: CPT | Mod: RT

## 2023-06-09 ENCOUNTER — APPOINTMENT (OUTPATIENT)
Dept: MRI IMAGING | Facility: CLINIC | Age: 68
End: 2023-06-09
Payer: OTHER MISCELLANEOUS

## 2023-06-09 ENCOUNTER — OUTPATIENT (OUTPATIENT)
Dept: OUTPATIENT SERVICES | Facility: HOSPITAL | Age: 68
LOS: 1 days | End: 2023-06-09
Payer: COMMERCIAL

## 2023-06-09 DIAGNOSIS — Z96.659 PRESENCE OF UNSPECIFIED ARTIFICIAL KNEE JOINT: ICD-10-CM

## 2023-06-09 DIAGNOSIS — Z90.49 ACQUIRED ABSENCE OF OTHER SPECIFIED PARTS OF DIGESTIVE TRACT: Chronic | ICD-10-CM

## 2023-06-09 DIAGNOSIS — Z00.8 ENCOUNTER FOR OTHER GENERAL EXAMINATION: ICD-10-CM

## 2023-06-09 DIAGNOSIS — Z98.890 OTHER SPECIFIED POSTPROCEDURAL STATES: Chronic | ICD-10-CM

## 2023-06-09 DIAGNOSIS — Z96.651 PRESENCE OF RIGHT ARTIFICIAL KNEE JOINT: Chronic | ICD-10-CM

## 2023-06-09 PROCEDURE — 73721 MRI JNT OF LWR EXTRE W/O DYE: CPT | Mod: 26,RT

## 2023-06-09 PROCEDURE — 73721 MRI JNT OF LWR EXTRE W/O DYE: CPT

## 2023-06-19 ENCOUNTER — RX RENEWAL (OUTPATIENT)
Age: 68
End: 2023-06-19

## 2023-06-21 ENCOUNTER — NON-APPOINTMENT (OUTPATIENT)
Age: 68
End: 2023-06-21

## 2023-06-22 NOTE — PRE-ANESTHESIA EVALUATION ADULT - NSANTHALCOHOLSD_GEN_ALL_CORE
INR not at goal. Medications, chart, and patient findings reviewed. See calendar for adjustments to dose and follow up plan.    
No

## 2023-06-26 ENCOUNTER — NON-APPOINTMENT (OUTPATIENT)
Age: 68
End: 2023-06-26

## 2023-06-26 ENCOUNTER — APPOINTMENT (OUTPATIENT)
Dept: CARDIOLOGY | Facility: CLINIC | Age: 68
End: 2023-06-26
Payer: MEDICARE

## 2023-06-26 VITALS — HEART RATE: 68 BPM | DIASTOLIC BLOOD PRESSURE: 80 MMHG | SYSTOLIC BLOOD PRESSURE: 110 MMHG

## 2023-06-26 VITALS
HEART RATE: 80 BPM | WEIGHT: 221 LBS | OXYGEN SATURATION: 97 % | BODY MASS INDEX: 32.73 KG/M2 | HEIGHT: 69 IN | RESPIRATION RATE: 17 BRPM

## 2023-06-26 DIAGNOSIS — I70.90 UNSPECIFIED ATHEROSCLEROSIS: ICD-10-CM

## 2023-06-26 PROCEDURE — 99214 OFFICE O/P EST MOD 30 MIN: CPT

## 2023-06-26 PROCEDURE — 93000 ELECTROCARDIOGRAM COMPLETE: CPT

## 2023-06-26 RX ORDER — AMLODIPINE BESYLATE 10 MG/1
10 TABLET ORAL
Qty: 90 | Refills: 3 | Status: ACTIVE | COMMUNITY
Start: 2020-06-18 | End: 1900-01-01

## 2023-06-26 RX ORDER — METOPROLOL TARTRATE 25 MG/1
25 TABLET, FILM COATED ORAL
Qty: 90 | Refills: 1 | Status: DISCONTINUED | COMMUNITY
Start: 2020-03-04 | End: 2023-06-26

## 2023-06-26 NOTE — REASON FOR VISIT
[Hyperlipidemia] : hyperlipidemia [Hypertension] : hypertension [FreeTextEntry1] : Patient returns for followup. Feeling well. Offers no complaints of chest discomfort shortness of breath palpitations dizziness or syncope.\par

## 2023-06-26 NOTE — ASSESSMENT
[FreeTextEntry1] : Impression:\par 1. Hypertension well controlled\par 2. History of dyslipidemia at goal on statn at last check\par 3. Minimal coronary disease\par 4.Mild to moderate AS mild AI\par \par Plan:\par 1.  Continue current regimen\par 2.  Blood work to reassess lipd levels\par

## 2023-07-21 ENCOUNTER — APPOINTMENT (OUTPATIENT)
Dept: ORTHOPEDIC SURGERY | Facility: CLINIC | Age: 68
End: 2023-07-21
Payer: OTHER MISCELLANEOUS

## 2023-07-21 PROCEDURE — 99455 WORK RELATED DISABILITY EXAM: CPT

## 2023-09-18 NOTE — ED PROVIDER NOTE - PHYSICAL EXAMINATION
AAOx3  Heart: s1/s2, RRR  Lung: CTA b/l  Abd: soft, NT/ND, no rebound or guarding, NCVAT  Extremity: no pedal edema or calf pain,  Neuro: patient moving all extremity equally, no focal neuro deficits noted Strong peripheral pulses
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
## 2023-10-06 ENCOUNTER — APPOINTMENT (OUTPATIENT)
Dept: ORTHOPEDIC SURGERY | Facility: CLINIC | Age: 68
End: 2023-10-06
Payer: OTHER MISCELLANEOUS

## 2023-10-06 DIAGNOSIS — Z96.651 PRESENCE OF RIGHT ARTIFICIAL KNEE JOINT: ICD-10-CM

## 2023-10-06 PROCEDURE — 99214 OFFICE O/P EST MOD 30 MIN: CPT

## 2023-10-12 LAB
ANION GAP SERPL CALC-SCNC: 19 MMOL/L
BUN SERPL-MCNC: 15 MG/DL
CALCIUM SERPL-MCNC: 9.7 MG/DL
CHLORIDE SERPL-SCNC: 98 MMOL/L
CO2 SERPL-SCNC: 24 MMOL/L
CREAT SERPL-MCNC: 0.94 MG/DL
CRP SERPL-MCNC: 5 MG/L
EGFR: 88 ML/MIN/1.73M2
ERYTHROCYTE [SEDIMENTATION RATE] IN BLOOD BY WESTERGREN METHOD: 15 MM/HR
GLUCOSE SERPL-MCNC: 51 MG/DL
HCT VFR BLD CALC: 43 %
HGB BLD-MCNC: 13.9 G/DL
MCHC RBC-ENTMCNC: 28.7 PG
MCHC RBC-ENTMCNC: 32.3 GM/DL
MCV RBC AUTO: 88.8 FL
PLATELET # BLD AUTO: 336 K/UL
POTASSIUM SERPL-SCNC: 3.8 MMOL/L
RBC # BLD: 4.84 M/UL
RBC # FLD: 15 %
SODIUM SERPL-SCNC: 140 MMOL/L
WBC # FLD AUTO: 7.32 K/UL

## 2023-10-17 ENCOUNTER — APPOINTMENT (OUTPATIENT)
Dept: CARDIOLOGY | Facility: CLINIC | Age: 68
End: 2023-10-17
Payer: MEDICARE

## 2023-10-17 ENCOUNTER — NON-APPOINTMENT (OUTPATIENT)
Age: 68
End: 2023-10-17

## 2023-10-17 VITALS — SYSTOLIC BLOOD PRESSURE: 138 MMHG | DIASTOLIC BLOOD PRESSURE: 80 MMHG | HEART RATE: 88 BPM

## 2023-10-17 VITALS — SYSTOLIC BLOOD PRESSURE: 140 MMHG | DIASTOLIC BLOOD PRESSURE: 80 MMHG

## 2023-10-17 VITALS — SYSTOLIC BLOOD PRESSURE: 137 MMHG | DIASTOLIC BLOOD PRESSURE: 80 MMHG

## 2023-10-17 VITALS — HEIGHT: 69 IN | BODY MASS INDEX: 31.7 KG/M2 | OXYGEN SATURATION: 98 % | HEART RATE: 90 BPM | WEIGHT: 214 LBS

## 2023-10-17 DIAGNOSIS — R55 SYNCOPE AND COLLAPSE: ICD-10-CM

## 2023-10-17 PROCEDURE — 93246 EXT ECG>7D<15D RECORDING: CPT

## 2023-10-17 PROCEDURE — 99215 OFFICE O/P EST HI 40 MIN: CPT

## 2023-10-17 PROCEDURE — 93000 ELECTROCARDIOGRAM COMPLETE: CPT | Mod: 59

## 2023-10-23 NOTE — DISCHARGE NOTE PROVIDER - NSDCHHENCOUNTER_GEN_ALL_CORE
Health Maintenance Due   Topic Date Due   • COVID-19 Vaccine (1) Never done   • Influenza Vaccine (1 of 2) Never done   • DTaP/Tdap/Td Vaccine (4 - DTaP) 09/19/2023       Patient is due for topics as listed above but is not proceeding with Immunization(s) Influenza at this time. Mom declined    04-Mar-2020

## 2023-10-25 ENCOUNTER — LABORATORY RESULT (OUTPATIENT)
Age: 68
End: 2023-10-25

## 2023-10-25 ENCOUNTER — APPOINTMENT (OUTPATIENT)
Dept: ORTHOPEDIC SURGERY | Facility: CLINIC | Age: 68
End: 2023-10-25
Payer: OTHER MISCELLANEOUS

## 2023-10-25 VITALS — WEIGHT: 214 LBS | HEIGHT: 69 IN | BODY MASS INDEX: 31.7 KG/M2

## 2023-10-25 LAB
B PERT IGG+IGM PNL SER: ABNORMAL
COLOR FLD: YELLOW
EOSINOPHIL # FLD MANUAL: 0 %
FLUID INTAKE SUBSTANCE CLASS: NORMAL
LYMPHOCYTES # FLD MANUAL: 3 %
MESOTHL CELL NFR FLD: 0 %
MONOS+MACROS NFR FLD MANUAL: 6 %
NEUTS SEG # FLD MANUAL: 91 %
NRBC # FLD: 0 %
RBC # FLD MANUAL: 7000 /UL
SYCRY CLARITY: ABNORMAL
SYCRY COLOR: YELLOW
SYCRY ID: NORMAL
SYCRY TUBE: NORMAL
TOTAL CELLS COUNTED FLD: 9289 /UL
TUBE TYPE: NORMAL
UNIDENT CELLS NFR FLD MANUAL: 0 %
VARIANT LYMPHS # FLD MANUAL: 0 %

## 2023-10-25 PROCEDURE — 99214 OFFICE O/P EST MOD 30 MIN: CPT

## 2023-11-14 ENCOUNTER — APPOINTMENT (OUTPATIENT)
Dept: CARDIOLOGY | Facility: CLINIC | Age: 68
End: 2023-11-14
Payer: MEDICARE

## 2023-11-14 PROCEDURE — 78452 HT MUSCLE IMAGE SPECT MULT: CPT

## 2023-11-14 PROCEDURE — A9500: CPT

## 2023-11-14 PROCEDURE — 93015 CV STRESS TEST SUPVJ I&R: CPT

## 2023-11-17 ENCOUNTER — APPOINTMENT (OUTPATIENT)
Dept: ORTHOPEDIC SURGERY | Facility: CLINIC | Age: 68
End: 2023-11-17
Payer: OTHER MISCELLANEOUS

## 2023-11-17 VITALS — BODY MASS INDEX: 31.7 KG/M2 | HEIGHT: 69 IN | WEIGHT: 214 LBS

## 2023-11-17 PROCEDURE — 99214 OFFICE O/P EST MOD 30 MIN: CPT

## 2023-11-21 NOTE — ED ADULT NURSE NOTE - BREATHING, MLM
Spoke with patient and she confirmed her nurse visit on 11/27/23. I verified patient pcp, demographic information and insurance   Spontaneous, unlabored and symmetrical

## 2023-12-18 ENCOUNTER — NON-APPOINTMENT (OUTPATIENT)
Age: 68
End: 2023-12-18

## 2023-12-18 ENCOUNTER — APPOINTMENT (OUTPATIENT)
Dept: CARDIOLOGY | Facility: CLINIC | Age: 68
End: 2023-12-18
Payer: MEDICARE

## 2023-12-18 VITALS
SYSTOLIC BLOOD PRESSURE: 135 MMHG | HEIGHT: 69 IN | DIASTOLIC BLOOD PRESSURE: 82 MMHG | RESPIRATION RATE: 16 BRPM | HEART RATE: 73 BPM | BODY MASS INDEX: 31.99 KG/M2 | WEIGHT: 216 LBS | OXYGEN SATURATION: 97 %

## 2023-12-18 VITALS — HEART RATE: 80 BPM | DIASTOLIC BLOOD PRESSURE: 70 MMHG | SYSTOLIC BLOOD PRESSURE: 116 MMHG

## 2023-12-18 DIAGNOSIS — I35.0 NONRHEUMATIC AORTIC (VALVE) STENOSIS: ICD-10-CM

## 2023-12-18 PROCEDURE — 93000 ELECTROCARDIOGRAM COMPLETE: CPT

## 2023-12-18 PROCEDURE — 99214 OFFICE O/P EST MOD 30 MIN: CPT

## 2023-12-18 NOTE — PHYSICAL EXAM
[5th Left ICS - MCL] : palpated at the 5th LICS in the midclavicular line [Normal Rate] : normal [Rhythm Regular] : regular [Normal S1] : normal S1 [Normal S2] : normal S2 [II] : a grade 2 [Normal] : clear lung fields, good air entry, no respiratory distress

## 2023-12-18 NOTE — ASSESSMENT
[FreeTextEntry1] : Impression: 1.  Hypertension well-controlled 2.  Hyperlipidemia at goal at last check 3.  Moderate calcific aortic stenosis  Plan: 1.  For now continue current medical regimen 2.  Echocardiogram with next visit
.

## 2023-12-18 NOTE — REASON FOR VISIT
[Hypertension] : hypertension [Coronary Artery Disease] : coronary artery disease [Other: ____] : [unfilled] [FreeTextEntry1] : Patient returns for followup. Feeling well. Offers no complaints of chest discomfort shortness of breath palpitations dizziness or syncope.  He as mentioned has had no further events.  Unfortunately he was unable to keep the Zio patch on and therefore we have no data from this.  He unfortunately continues to have significant problems with his knee.  He however continues to work at a fairly active job.

## 2024-01-02 ENCOUNTER — APPOINTMENT (OUTPATIENT)
Dept: CARDIOLOGY | Facility: CLINIC | Age: 69
End: 2024-01-02
Payer: MEDICARE

## 2024-01-02 PROCEDURE — 93306 TTE W/DOPPLER COMPLETE: CPT

## 2024-01-19 ENCOUNTER — APPOINTMENT (OUTPATIENT)
Dept: ORTHOPEDIC SURGERY | Facility: CLINIC | Age: 69
End: 2024-01-19

## 2024-01-19 NOTE — ED ADULT NURSE NOTE - NS ED NOTE  TALK SOMEONE YN
oriented to person, place and time , normal sensation  , cooperative with exam, normal mood with an appropriate affect No

## 2024-01-22 ENCOUNTER — APPOINTMENT (OUTPATIENT)
Dept: ORTHOPEDIC SURGERY | Facility: CLINIC | Age: 69
End: 2024-01-22
Payer: OTHER MISCELLANEOUS

## 2024-01-22 VITALS — WEIGHT: 214 LBS | BODY MASS INDEX: 31.7 KG/M2 | HEIGHT: 69 IN

## 2024-01-22 DIAGNOSIS — Z96.651 PRESENCE OF RIGHT ARTIFICIAL KNEE JOINT: ICD-10-CM

## 2024-01-22 DIAGNOSIS — Z96.659 PRESENCE OF UNSPECIFIED ARTIFICIAL KNEE JOINT: ICD-10-CM

## 2024-01-22 PROCEDURE — 99214 OFFICE O/P EST MOD 30 MIN: CPT

## 2024-01-22 PROCEDURE — 73562 X-RAY EXAM OF KNEE 3: CPT | Mod: RT

## 2024-03-05 ENCOUNTER — NON-APPOINTMENT (OUTPATIENT)
Age: 69
End: 2024-03-05

## 2024-03-05 ENCOUNTER — APPOINTMENT (OUTPATIENT)
Dept: CARDIOLOGY | Facility: CLINIC | Age: 69
End: 2024-03-05
Payer: MEDICARE

## 2024-03-05 VITALS — HEART RATE: 94 BPM | BODY MASS INDEX: 32.29 KG/M2 | OXYGEN SATURATION: 99 % | HEIGHT: 69 IN | WEIGHT: 218 LBS

## 2024-03-05 VITALS — HEART RATE: 72 BPM | DIASTOLIC BLOOD PRESSURE: 80 MMHG | SYSTOLIC BLOOD PRESSURE: 122 MMHG

## 2024-03-05 DIAGNOSIS — E78.5 HYPERLIPIDEMIA, UNSPECIFIED: ICD-10-CM

## 2024-03-05 DIAGNOSIS — I10 ESSENTIAL (PRIMARY) HYPERTENSION: ICD-10-CM

## 2024-03-05 PROCEDURE — 99215 OFFICE O/P EST HI 40 MIN: CPT

## 2024-03-05 PROCEDURE — G2211 COMPLEX E/M VISIT ADD ON: CPT

## 2024-03-05 PROCEDURE — 93000 ELECTROCARDIOGRAM COMPLETE: CPT

## 2024-03-05 NOTE — CARDIOLOGY SUMMARY
[de-identified] : 3/5/.2024 Sinus rhythm clockwise rotation ST abnormalities anterolateral and lateral leads possibly consistent with ischemia or LVH

## 2024-03-05 NOTE — REASON FOR VISIT
[Arrhythmia/ECG Abnorrmalities] : arrhythmia/ECG abnormalities [Structural Heart and Valve Disease] : structural heart and valve disease [FreeTextEntry1] : Patient returns for follow-up.  He once again had a symptom complex several days ago in which she was cooking at the [employer] and after he was done and finished and what he described as a reasonably cool room without a lot of people left he suddenly felt very weak nauseous and diaphoretic.  He sat down and did not lose consciousness.  He refused to go to the hospital.  Of note is the fact that when the patient had a syncopal episode several months ago and had a monitor placed this malfunction and he only wore it for 1 day.  The patient presented today with his significant other who states he just has not been himself lately.  She states that he is more fatigued which she agrees with and possibly more short of breath which she agrees with as well.  He denies chest discomfort.

## 2024-03-05 NOTE — ASSESSMENT
[FreeTextEntry1] : In summary, the patient is a 69-year-old man with very concerning symptoms he had a syncopal episode that was unexplained several months ago now he had an episode of diaphoresis and weakness as well as near syncope.  He has chronic fatigue which could be an anginal equivalent.  He is somewhat short of breath at times which could be an anginal equivalent.  Patient's echocardiogram of several months ago revealed significant calcification of the aortic valve.  Valve area was 1.61 however this could have been an underestimation and I am concerned regarding aortic stenosis given his clinical exam.  Given the symptoms and given a normal vasodilator study in November (which could have been a false negative due to balanced ischemia)) an abnormal ekg his hyperlipidemia and hypertension as well as his aortic stenosis best test at this time would be cardiac catheterization to rule out coronary disease as a cause of his symptoms and to rule out an underestimation of aortic stenosis

## 2024-03-05 NOTE — PHYSICAL EXAM
[5th Left ICS - MCL] : palpated at the 5th LICS in the midclavicular line [Normal] : normal [Normal Rate] : normal [Rhythm Regular] : regular [Normal S1] : abnormal S1 [S1 Diminished] : was diminished [Normal S2] : normal S2 [II] : a grade 2

## 2024-03-11 NOTE — PHYSICAL THERAPY INITIAL EVALUATION ADULT - MD/RN NOTIFIED
PA Initiation    Medication: TRESIBA FLEXTOUCH 200 UNIT/ML SC SOPN  Insurance Company: [facility] - Phone [phone] Fax [phone]  Pharmacy Filling the Rx: [id number] - [place], [place] - [facility]  Filling Pharmacy Phone:    Filling Pharmacy Fax:    Start Date: 3/11/2024    Key: [id number]       
Prior Authorization Approval    Medication: TRESIBA FLEXTOUCH 200 UNIT/ML SC SOPN  Authorization Effective Date: 3/11/2024  Authorization Expiration Date: 3/11/2025  Approved Dose/Quantity: 9ml per 30 days  Reference #: Key: [id number]   Insurance Company: [facility] - Phone [phone] Fax [phone]  Expected CoPay: $ 0  CoPay Card Available:      Financial Assistance Needed: no  Which Pharmacy is filling the prescription: [id number] - [place], [place] - [address] [place] [address]  Pharmacy Notified: yes  Patient Notified: yes      
yes

## 2024-03-19 ENCOUNTER — OUTPATIENT (OUTPATIENT)
Dept: OUTPATIENT SERVICES | Facility: HOSPITAL | Age: 69
LOS: 1 days | End: 2024-03-19
Payer: MEDICARE

## 2024-03-19 ENCOUNTER — TRANSCRIPTION ENCOUNTER (OUTPATIENT)
Age: 69
End: 2024-03-19

## 2024-03-19 VITALS
DIASTOLIC BLOOD PRESSURE: 91 MMHG | SYSTOLIC BLOOD PRESSURE: 170 MMHG | RESPIRATION RATE: 18 BRPM | OXYGEN SATURATION: 98 % | TEMPERATURE: 98 F | WEIGHT: 218.04 LBS | HEART RATE: 83 BPM | HEIGHT: 69 IN

## 2024-03-19 VITALS
SYSTOLIC BLOOD PRESSURE: 147 MMHG | OXYGEN SATURATION: 98 % | HEART RATE: 61 BPM | RESPIRATION RATE: 18 BRPM | DIASTOLIC BLOOD PRESSURE: 67 MMHG

## 2024-03-19 DIAGNOSIS — Z90.49 ACQUIRED ABSENCE OF OTHER SPECIFIED PARTS OF DIGESTIVE TRACT: Chronic | ICD-10-CM

## 2024-03-19 DIAGNOSIS — I35.0 NONRHEUMATIC AORTIC (VALVE) STENOSIS: ICD-10-CM

## 2024-03-19 DIAGNOSIS — Z96.651 PRESENCE OF RIGHT ARTIFICIAL KNEE JOINT: Chronic | ICD-10-CM

## 2024-03-19 DIAGNOSIS — Z98.890 OTHER SPECIFIED POSTPROCEDURAL STATES: Chronic | ICD-10-CM

## 2024-03-19 LAB
ANION GAP SERPL CALC-SCNC: 14 MMOL/L — SIGNIFICANT CHANGE UP (ref 5–17)
BUN SERPL-MCNC: 19 MG/DL — SIGNIFICANT CHANGE UP (ref 7–23)
CALCIUM SERPL-MCNC: 9.3 MG/DL — SIGNIFICANT CHANGE UP (ref 8.4–10.5)
CHLORIDE SERPL-SCNC: 101 MMOL/L — SIGNIFICANT CHANGE UP (ref 96–108)
CO2 SERPL-SCNC: 21 MMOL/L — LOW (ref 22–31)
CREAT SERPL-MCNC: 0.94 MG/DL — SIGNIFICANT CHANGE UP (ref 0.5–1.3)
EGFR: 88 ML/MIN/1.73M2 — SIGNIFICANT CHANGE UP
GLUCOSE SERPL-MCNC: 116 MG/DL — HIGH (ref 70–99)
HCT VFR BLD CALC: 44.4 % — SIGNIFICANT CHANGE UP (ref 39–50)
HGB BLD-MCNC: 15.1 G/DL — SIGNIFICANT CHANGE UP (ref 13–17)
MCHC RBC-ENTMCNC: 28.8 PG — SIGNIFICANT CHANGE UP (ref 27–34)
MCHC RBC-ENTMCNC: 34 GM/DL — SIGNIFICANT CHANGE UP (ref 32–36)
MCV RBC AUTO: 84.6 FL — SIGNIFICANT CHANGE UP (ref 80–100)
NRBC # BLD: 0 /100 WBCS — SIGNIFICANT CHANGE UP (ref 0–0)
PLATELET # BLD AUTO: 227 K/UL — SIGNIFICANT CHANGE UP (ref 150–400)
POTASSIUM SERPL-MCNC: 4.5 MMOL/L — SIGNIFICANT CHANGE UP (ref 3.5–5.3)
POTASSIUM SERPL-SCNC: 4.5 MMOL/L — SIGNIFICANT CHANGE UP (ref 3.5–5.3)
RBC # BLD: 5.25 M/UL — SIGNIFICANT CHANGE UP (ref 4.2–5.8)
RBC # FLD: 13.7 % — SIGNIFICANT CHANGE UP (ref 10.3–14.5)
SODIUM SERPL-SCNC: 136 MMOL/L — SIGNIFICANT CHANGE UP (ref 135–145)
WBC # BLD: 8.08 K/UL — SIGNIFICANT CHANGE UP (ref 3.8–10.5)
WBC # FLD AUTO: 8.08 K/UL — SIGNIFICANT CHANGE UP (ref 3.8–10.5)

## 2024-03-19 PROCEDURE — C1887: CPT

## 2024-03-19 PROCEDURE — 93010 ELECTROCARDIOGRAM REPORT: CPT

## 2024-03-19 PROCEDURE — C1894: CPT

## 2024-03-19 PROCEDURE — 93454 CORONARY ARTERY ANGIO S&I: CPT

## 2024-03-19 PROCEDURE — 80048 BASIC METABOLIC PNL TOTAL CA: CPT

## 2024-03-19 PROCEDURE — C1769: CPT

## 2024-03-19 PROCEDURE — 85027 COMPLETE CBC AUTOMATED: CPT

## 2024-03-19 PROCEDURE — 99152 MOD SED SAME PHYS/QHP 5/>YRS: CPT

## 2024-03-19 PROCEDURE — 93454 CORONARY ARTERY ANGIO S&I: CPT | Mod: 26

## 2024-03-19 PROCEDURE — 93005 ELECTROCARDIOGRAM TRACING: CPT

## 2024-03-19 RX ORDER — METOPROLOL TARTRATE 50 MG
1 TABLET ORAL
Refills: 0 | DISCHARGE

## 2024-03-19 RX ORDER — SODIUM CHLORIDE 9 MG/ML
1000 INJECTION INTRAMUSCULAR; INTRAVENOUS; SUBCUTANEOUS
Refills: 0 | Status: DISCONTINUED | OUTPATIENT
Start: 2024-03-19 | End: 2024-04-02

## 2024-03-19 RX ORDER — SODIUM CHLORIDE 9 MG/ML
250 INJECTION INTRAMUSCULAR; INTRAVENOUS; SUBCUTANEOUS ONCE
Refills: 0 | Status: COMPLETED | OUTPATIENT
Start: 2024-03-19 | End: 2024-03-19

## 2024-03-19 RX ORDER — OMEPRAZOLE 10 MG/1
1 CAPSULE, DELAYED RELEASE ORAL
Qty: 0 | Refills: 0 | DISCHARGE

## 2024-03-19 RX ORDER — ATORVASTATIN CALCIUM 80 MG/1
1 TABLET, FILM COATED ORAL
Qty: 0 | Refills: 0 | DISCHARGE

## 2024-03-19 RX ADMIN — SODIUM CHLORIDE 250 MILLILITER(S): 9 INJECTION INTRAMUSCULAR; INTRAVENOUS; SUBCUTANEOUS at 11:52

## 2024-03-19 RX ADMIN — SODIUM CHLORIDE 75 MILLILITER(S): 9 INJECTION INTRAMUSCULAR; INTRAVENOUS; SUBCUTANEOUS at 11:51

## 2024-03-19 NOTE — ASU DISCHARGE PLAN (ADULT/PEDIATRIC) - CARE PROVIDER_API CALL
Saman Blas  Cardiology  [address], [address]  [place] [address]  Phone: [phone]  Fax: [phone]  Follow Up Time: 2 weeks   Citizen of [place]

## 2024-03-19 NOTE — H&P CARDIOLOGY - HISTORY OF PRESENT ILLNESS
70 y/o male with PMHx of OA, Retinopathy (2/2 HTN), HTN, HLD, and known AS (TTE 1/2024 demonstrating mod AS with CHRISTIANA 1.61) who presented to his Cardiologist on 3/5 for eval after recent near syncopal episode.  Of note the patient experienced syncopal episode a few months prior after which he wore event monitor for only one day.  Patient also endorsing recent increase in fatigue and class II anginal equivalent exertional dyspnea.  Given patients anginal equivalent symptoms and hx of AS, decision was made to proceed with cath to eval for ischemic heart disease and for underestimation of AS on TTE.   68 y/o male with PMHx of OA, Retinopathy (2/2 HTN), HTN, HLD, and known AS (TTE 1/2024 demonstrating mod AS with CHRISTIANA 1.61) and family hx of Valvular HD who presented to his Cardiologist on 3/5 for eval after recent near syncopal episode.  Of note the patient experienced syncopal episode a few months prior after which he wore event monitor for only one day.  Patient also endorsing recent increase in fatigue and class II anginal equivalent exertional dyspnea.  Given patients anginal equivalent symptoms and hx of AS, decision was made to proceed with cath to eval for ischemic heart disease and for underestimation of AS on TTE.

## 2024-05-14 NOTE — HISTORY OF PRESENT ILLNESS
Problem: Vaginal Birth or  Section  Goal: Fetal and maternal status remain reassuring during the birth process  Description:  Birth OB-Pregnancy care plan goal which identifies if the fetal and maternal status remain reassuring during the birth process  Outcome: Progressing     Problem: Postpartum  Goal: Experiences normal postpartum course  Description:  Postpartum OB-Pregnancy care plan goal which identifies if the mother is experiencing a normal postpartum course  Outcome: Progressing  Goal: Appropriate maternal -  bonding  Description:  Postpartum OB-Pregnancy care plan goal which identifies if the mother and  are bonding appropriately  Outcome: Progressing  Goal: Establishment of infant feeding pattern  Description:  Postpartum OB-Pregnancy care plan goal which identifies if the mother is establishing a feeding pattern with their   Outcome: Progressing  Goal: Incisions, wounds, or drain sites healing without S/S of infection  Outcome: Progressing     Problem: Pain  Goal: Verbalizes/displays adequate comfort level or baseline comfort level  Outcome: Progressing     Problem: Infection - Adult  Goal: Absence of infection at discharge  Outcome: Progressing  Goal: Absence of infection during hospitalization  Outcome: Progressing     Problem: Safety - Adult  Goal: Free from fall injury  Outcome: Progressing     Problem: Discharge Planning  Goal: Discharge to home or other facility with appropriate resources  Outcome: Progressing      [de-identified] : 65 year old male presents for an evaluation of left knee pain. Patient reports the onset of left knee pain approximately one week ago. Patient denies injury or trauma to the area.  The patient describes the pain as a dull aching, and occasionally sharp pain localized to the medial aspect of his that is intermittent in nature. His symptoms are exacerbated with bending, and increased intensity walking. Pain is alleviated with rest. Patient reports diffuse swelling of the left knee. Patient is taking NSAIDs for pain relief with moderate relief in symptoms. Of note s/p Right total knee arthroplasty on 1/15/2020. Status post I&D on 02/28/2020. Patient denies any other complaints at this time.\par \par

## 2024-05-18 ENCOUNTER — NON-APPOINTMENT (OUTPATIENT)
Age: 69
End: 2024-05-18

## 2024-05-19 ENCOUNTER — EMERGENCY (EMERGENCY)
Facility: HOSPITAL | Age: 69
LOS: 1 days | Discharge: HOME | End: 2024-05-19
Attending: EMERGENCY MEDICINE | Admitting: EMERGENCY MEDICINE
Payer: MEDICARE

## 2024-05-19 VITALS
SYSTOLIC BLOOD PRESSURE: 165 MMHG | HEART RATE: 79 BPM | DIASTOLIC BLOOD PRESSURE: 84 MMHG | RESPIRATION RATE: 18 BRPM | WEIGHT: 214.07 LBS | TEMPERATURE: 98 F | OXYGEN SATURATION: 98 % | HEIGHT: 69 IN

## 2024-05-19 DIAGNOSIS — Z90.49 ACQUIRED ABSENCE OF OTHER SPECIFIED PARTS OF DIGESTIVE TRACT: Chronic | ICD-10-CM

## 2024-05-19 DIAGNOSIS — Z98.890 OTHER SPECIFIED POSTPROCEDURAL STATES: Chronic | ICD-10-CM

## 2024-05-19 DIAGNOSIS — Z96.651 PRESENCE OF RIGHT ARTIFICIAL KNEE JOINT: Chronic | ICD-10-CM

## 2024-05-19 LAB
ALBUMIN SERPL ELPH-MCNC: 3.8 G/DL — SIGNIFICANT CHANGE UP (ref 3.3–5)
ALP SERPL-CCNC: 116 U/L — SIGNIFICANT CHANGE UP (ref 40–120)
ALT FLD-CCNC: 30 U/L — SIGNIFICANT CHANGE UP (ref 10–45)
ANION GAP SERPL CALC-SCNC: 7 MMOL/L — SIGNIFICANT CHANGE UP (ref 5–17)
APTT BLD: 31.5 SEC — SIGNIFICANT CHANGE UP (ref 24.5–35.6)
AST SERPL-CCNC: 23 U/L — SIGNIFICANT CHANGE UP (ref 10–40)
BASOPHILS # BLD AUTO: 0.04 K/UL — SIGNIFICANT CHANGE UP (ref 0–0.2)
BASOPHILS NFR BLD AUTO: 0.4 % — SIGNIFICANT CHANGE UP (ref 0–2)
BILIRUB SERPL-MCNC: 2.5 MG/DL — HIGH (ref 0.2–1.2)
BUN SERPL-MCNC: 18 MG/DL — SIGNIFICANT CHANGE UP (ref 7–23)
CALCIUM SERPL-MCNC: 9.1 MG/DL — SIGNIFICANT CHANGE UP (ref 8.4–10.5)
CHLORIDE SERPL-SCNC: 102 MMOL/L — SIGNIFICANT CHANGE UP (ref 96–108)
CO2 SERPL-SCNC: 31 MMOL/L — SIGNIFICANT CHANGE UP (ref 22–31)
CREAT SERPL-MCNC: 1.1 MG/DL — SIGNIFICANT CHANGE UP (ref 0.5–1.3)
EGFR: 73 ML/MIN/1.73M2 — SIGNIFICANT CHANGE UP
EOSINOPHIL # BLD AUTO: 0.15 K/UL — SIGNIFICANT CHANGE UP (ref 0–0.5)
EOSINOPHIL NFR BLD AUTO: 1.6 % — SIGNIFICANT CHANGE UP (ref 0–6)
GLUCOSE SERPL-MCNC: 106 MG/DL — HIGH (ref 70–99)
HCT VFR BLD CALC: 39.7 % — SIGNIFICANT CHANGE UP (ref 39–50)
HGB BLD-MCNC: 14 G/DL — SIGNIFICANT CHANGE UP (ref 13–17)
IMM GRANULOCYTES NFR BLD AUTO: 0.3 % — SIGNIFICANT CHANGE UP (ref 0–0.9)
INR BLD: 1.03 RATIO — SIGNIFICANT CHANGE UP (ref 0.85–1.18)
LYMPHOCYTES # BLD AUTO: 1.72 K/UL — SIGNIFICANT CHANGE UP (ref 1–3.3)
LYMPHOCYTES # BLD AUTO: 18.2 % — SIGNIFICANT CHANGE UP (ref 13–44)
MCHC RBC-ENTMCNC: 29.3 PG — SIGNIFICANT CHANGE UP (ref 27–34)
MCHC RBC-ENTMCNC: 35.3 GM/DL — SIGNIFICANT CHANGE UP (ref 32–36)
MCV RBC AUTO: 83.1 FL — SIGNIFICANT CHANGE UP (ref 80–100)
MONOCYTES # BLD AUTO: 0.51 K/UL — SIGNIFICANT CHANGE UP (ref 0–0.9)
MONOCYTES NFR BLD AUTO: 5.4 % — SIGNIFICANT CHANGE UP (ref 2–14)
NEUTROPHILS # BLD AUTO: 6.99 K/UL — SIGNIFICANT CHANGE UP (ref 1.8–7.4)
NEUTROPHILS NFR BLD AUTO: 74.1 % — SIGNIFICANT CHANGE UP (ref 43–77)
NRBC # BLD: 0 /100 WBCS — SIGNIFICANT CHANGE UP (ref 0–0)
PLATELET # BLD AUTO: 264 K/UL — SIGNIFICANT CHANGE UP (ref 150–400)
POTASSIUM SERPL-MCNC: 3.8 MMOL/L — SIGNIFICANT CHANGE UP (ref 3.5–5.3)
POTASSIUM SERPL-SCNC: 3.8 MMOL/L — SIGNIFICANT CHANGE UP (ref 3.5–5.3)
PROT SERPL-MCNC: 7.4 G/DL — SIGNIFICANT CHANGE UP (ref 6–8.3)
PROTHROM AB SERPL-ACNC: 12 SEC — SIGNIFICANT CHANGE UP (ref 9.5–13)
RBC # BLD: 4.78 M/UL — SIGNIFICANT CHANGE UP (ref 4.2–5.8)
RBC # FLD: 13.8 % — SIGNIFICANT CHANGE UP (ref 10.3–14.5)
SODIUM SERPL-SCNC: 140 MMOL/L — SIGNIFICANT CHANGE UP (ref 135–145)
TROPONIN I, HIGH SENSITIVITY RESULT: 13.3 NG/L — SIGNIFICANT CHANGE UP
WBC # BLD: 9.44 K/UL — SIGNIFICANT CHANGE UP (ref 3.8–10.5)
WBC # FLD AUTO: 9.44 K/UL — SIGNIFICANT CHANGE UP (ref 3.8–10.5)

## 2024-05-19 PROCEDURE — 99285 EMERGENCY DEPT VISIT HI MDM: CPT

## 2024-05-19 PROCEDURE — 93010 ELECTROCARDIOGRAM REPORT: CPT

## 2024-05-19 PROCEDURE — 99284 EMERGENCY DEPT VISIT MOD MDM: CPT

## 2024-05-19 PROCEDURE — 85610 PROTHROMBIN TIME: CPT

## 2024-05-19 PROCEDURE — 85025 COMPLETE CBC W/AUTO DIFF WBC: CPT

## 2024-05-19 PROCEDURE — 84484 ASSAY OF TROPONIN QUANT: CPT

## 2024-05-19 PROCEDURE — 99285 EMERGENCY DEPT VISIT HI MDM: CPT | Mod: 25

## 2024-05-19 PROCEDURE — 80053 COMPREHEN METABOLIC PANEL: CPT

## 2024-05-19 PROCEDURE — 36415 COLL VENOUS BLD VENIPUNCTURE: CPT

## 2024-05-19 PROCEDURE — 71045 X-RAY EXAM CHEST 1 VIEW: CPT | Mod: 26

## 2024-05-19 PROCEDURE — 85730 THROMBOPLASTIN TIME PARTIAL: CPT

## 2024-05-19 PROCEDURE — 71045 X-RAY EXAM CHEST 1 VIEW: CPT

## 2024-05-19 PROCEDURE — 93005 ELECTROCARDIOGRAM TRACING: CPT

## 2024-05-19 RX ORDER — SODIUM CHLORIDE 9 MG/ML
500 INJECTION INTRAMUSCULAR; INTRAVENOUS; SUBCUTANEOUS ONCE
Refills: 0 | Status: COMPLETED | OUTPATIENT
Start: 2024-05-19 | End: 2024-05-19

## 2024-05-19 RX ADMIN — SODIUM CHLORIDE 1000 MILLILITER(S): 9 INJECTION INTRAMUSCULAR; INTRAVENOUS; SUBCUTANEOUS at 14:52

## 2024-05-19 NOTE — ED PROVIDER NOTE - NSFOLLOWUPINSTRUCTIONS_ED_ALL_ED_FT
follow-up with your cardiologist Dr. Blas  In 1-2 days  Return to emergency department if lightheaded, dizzy, chest pain, trouble breathing, or other symptoms    Lightheadedness    WHAT YOU NEED TO KNOW:    Lightheadedness is the feeling that you may faint, but you do not. Your heartbeat may be fast or feel like it flutters. Lightheadedness may occur when you take certain medicines, such as medicine to lower your blood pressure. Dehydration, low sodium, low blood sugar, an abnormal heart rhythm, and anxiety are other common causes.    DISCHARGE INSTRUCTIONS:    Return to the emergency department if:    You have sudden chest pain.    You have trouble breathing or shortness of breath.    You have vision changes, are sweating, and have nausea while you are sitting or lying down.    You feel flushed and your heart is fluttering.    You faint.  Contact your healthcare provider if:    You feel lightheaded often.    Your heart beats faster or slower than usual.    You have questions or concerns about your condition or care.  Follow up with your healthcare provider as directed: You may need more tests to help find the cause of your lightheadedness. The tests will help healthcare providers plan the best treatment for you. Write down your questions so you remember to ask them during your visits.    Self-care: Talk with your healthcare provider about these and other ways to manage your symptoms:    Lie down when you feel lightheaded, your throat gets tight, or your vision changes. Raise your legs above the level of your heart.    Stand up slowly. Sit on the side of the bed or couch for a few minutes before you stand up.    Take slow, deep breaths when you feel lightheaded. This can help decrease the feeling that you might faint.    Ask if you need to avoid hot baths and saunas. These may make your symptoms worse.  Watch for signs of low blood sugar: These include hunger, nervousness, sweating, and fast or fluttery heartbeats. Talk with your healthcare provider about ways to keep your blood sugar level steady.    Check your blood pressure often: You should do this especially if you take medicine to lower your blood pressure. Check your blood pressure when you are lying down and when you are standing. Ask how often to check during the day. Keep a record of your blood pressure numbers. Your healthcare provider may use the record to help plan your treatment.    Keep a record of your lightheadedness episodes: Include your symptoms and your activity before and after the episode. The record can help your healthcare provider find the cause of your lightheadedness and help you manage episodes.

## 2024-05-19 NOTE — ED PROVIDER NOTE - OBJECTIVE STATEMENT
70 y/o male with PMHx of OA, Retinopathy (2/2 HTN), HTN, HLD, and known AS (TTE 1/2024 demonstrating mod AS. Patient states he was walking at work today and felt lightheaded   And got mildly diaphoretic.  Denies any chest pain or shortness of breath associated with this episode.  No dizziness.  No visual changes or headache.  No nausea or vomiting.  No abdominal pain.  No back pain.  Episode lasted  approximately 1 minute.  patient states he went to drink water as soon as the symptoms appeared and once he started drinking water they resolved.  States he does not drink a lot of water.  Patient had a cath in March 2024 which did not require any intervention. 68 y/o male with PMHx of OA, Retinopathy (2/2 HTN), HTN, HLD, and known AS (TTE 1/2024) negative cath 3/2024. demonstrating mod AS. Patient states he was walking at work today and felt lightheaded   And got mildly diaphoretic.  Denies any chest pain or shortness of breath associated with this episode.  No dizziness.  No visual changes or headache.  No nausea or vomiting.  No abdominal pain.  No back pain.  Episode lasted  approximately 1 minute.  patient states he went to drink water as soon as the symptoms appeared and once he started drinking water they resolved.  States he does not drink a lot of water.  Patient had a cath in March 2024 which did not require any intervention.

## 2024-05-19 NOTE — ED ADULT TRIAGE NOTE - CHIEF COMPLAINT QUOTE
Pt with c/o feeling lightheaded and diaphoretic for about 10 mins earlier this afternoon. Pt with hx HTN. State that he was at work when this happened. States that these episodes have happened in the past and has had work ups done and all testing is (-). Pt states that this normally happens "because I don't drink enough water." Pt denies any symptoms at this time. Denies chest pain, SOB or any other complaints.

## 2024-05-19 NOTE — ED ADULT NURSE NOTE - OBJECTIVE STATEMENT
Pt a&ox4 ambulatory to ED c/o episode where he had dizziness and became diaphoretic. Pt denies any complaints at this time. States he feels well. This has happened to him before in the past and pt has followed with his cardiologist. VSS. NAD.

## 2024-05-19 NOTE — ED ADULT NURSE NOTE - NSICDXFAMILYHX_GEN_ALL_CORE_FT
no
FAMILY HISTORY:  Family history of lung disease, mesothelioma  Family history of sarcoidosis, father

## 2024-05-19 NOTE — CONSULT NOTE ADULT - SUBJECTIVE AND OBJECTIVE BOX
CHIEF COMPLAINT:  Patient is a 69y old  Male who presents with a chief complaint of  ligtheadedness  HPI:  seen in er with daughter. d/w dr Herring. [place]  today at local store, on feet all day. Became lightheaded and diaphoretic. Felt better after sitting and hydration. No c/p sob  Recent cardiac cath ( non obstructive, 30%) Prior lightheaded episodes, followed in office by DR. Blas. Has Moderate AS and hbp      PMH:   HTN (hypertension)    High cholesterol    Osteoarthritis of right knee    Bladder cancer    Mini stroke    Bladder polyp    History of hypertensive retinopathy    Tear of medial meniscus of left knee    AI (aortic insufficiency)    GERD (gastroesophageal reflux disease)        PSH:   H/O hernia repair    History of cholecystectomy    S/P reconstruction procedure    H/O total knee replacement, right        FAMILY HISTORY:  FAMILY HISTORY:  Family history of lung disease  mesothelioma    Family history of sarcoidosis  father        SOCIAL HISTORY:  Smoking:  no  Alcohol:  Drugs:    ALLERGIES:  No Known Allergies      Home Medications:  atorvastatin 40 mg oral tablet: 1 tab(s) orally once a day (19 Mar 2024 10:49)  Metoprolol Succinate ER 25 mg oral tablet, extended release: 1 tab(s) orally once a day (19 Mar 2024 10:49)  amlodiine      MEDICATIONS:      REVIEW OF SYSTEMS:      PHYSICAL EXAM:  T(C): 36.5 (05-19-24 @ 14:22), Max: 36.5 (05-19-24 @ 14:22)  HR: 79 (05-19-24 @ 14:22) (79 - 79)  BP: 165/84 (05-19-24 @ 14:22) (165/84 - 165/84)  RR: 18 (05-19-24 @ 14:22) (18 - 18)  SpO2: 98% (05-19-24 @ 14:22) (98% - 98%)  Wt(kg): --    GENERAL: NAD, well-groomed, well-developed  HEAD:  Atraumatic, Normocephalic  EYES: EOMI, conjunctiva and sclera clear  ENT: Moist mucous membranes,  NECK: Supple, No JVD, no bruits  CHEST/LUNG: Clear to ausculation and percussion bilaterally; No rales, rhonchi, wheezing, or rubs  HEART: Regular rate and rhythm; 3/6 rosalio  ABDOMEN: Soft, Nontender, Nondistended; Bowel sounds present  EXTREMITIES:  2+ Peripheral Pulses, Mld edema bilateral  SKIN: No rashes or lesions  NERVOUS SYSTEM:  Alert & Oriented X3, No focal deficits    Cardiovascular Diagnostic Testing:  ECG:  sinus nstwc    LABS:                        14.0   9.44  )-----------( 264      ( 19 May 2024 14:35 )             39.7     05-19    140  |  102  |  18  ----------------------------<  106<H>  3.8   |  31  |  1.10    Ca    9.1      19 May 2024 14:35    TPro  7.4  /  Alb  3.8  /  TBili  2.5<H>  /  DBili  x   /  AST  23  /  ALT  30  /  AlkPhos  116  05-19    PT/INR - ( 19 May 2024 14:35 )   PT: 12.0 sec;   INR: 1.03 ratio         PTT - ( 19 May 2024 14:35 )  PTT:31.5 sec        Troponin I, High Sensitivity Result: 13.3 ng/L (05-19-24 @ 14:35)    IMAGING:        recent cath [facility] 2024 30% single vessel no intervent    echo normal lv function  moderate aortic stenosis

## 2024-05-19 NOTE — ED PROVIDER NOTE - NS ED ROS FT
Except as otherwise indicated in HPI:  CONSTITUTIONAL: Neg  HEENT: neg  CV: neg  Resp: neg  GI: Neg  : Neg  MSK: Neg  SKIN: Neg  NEURO: +lightheaded (resolved)  PSYCHIATRIC: Neg  Heme/Onc: Neg

## 2024-05-19 NOTE — ED PROVIDER NOTE - CARE PROVIDER_API CALL
Saman Blas  Cardiology  [address], [address]  [place] [address]  Phone: [phone]  Fax: [phone]  Follow Up Time:

## 2024-05-19 NOTE — ED PROVIDER NOTE - CLINICAL SUMMARY MEDICAL DECISION MAKING FREE TEXT BOX
68 y/o male with PMHx of OA, Retinopathy (2/2 HTN), HTN, HLD, and known AS (TTE 1/2024 demonstrating mod AS. Patient states he was walking at work today and felt lightheaded   And got mildly diaphoretic.  Denies any chest pain or shortness of breath associated with this episode.  No dizziness.  No visual changes or headache.  No nausea or vomiting.  No abdominal pain.  No back pain.  Episode lasted  approximately 1 minute.  patient states he went to drink water as soon as the symptoms appeared and once he started drinking water they resolved.  States he does not drink a lot of water.  Patient had a cath in March 2024 which did not require any intervention.      EKG, CBC, CMP, chest x-ray, Trop, coags, IV fluids, reassess 70 y/o male with PMHx of OA, Retinopathy (2/2 HTN), HTN, HLD, and known AS (TTE 1/2024 demonstrating mod AS) ngative cath on 3/2024. Patient states he was walking at work today and felt lightheaded   And got mildly diaphoretic.  Denies any chest pain or shortness of breath associated with this episode.  No dizziness.  No visual changes or headache.  No nausea or vomiting.  No abdominal pain.  No back pain.  Episode lasted  approximately 1 minute.  patient states he went to drink water as soon as the symptoms appeared and once he started drinking water they resolved.  States he does not drink a lot of water.  Patient had a cath in March 2024 which did not require any intervention.      EKG, CBC, CMP, chest x-ray, Trop, coags, IV fluids, reassess   Case discussed with Dr. Meraz cardiology attending does not believe this was ischemic event in patient with recent negative cath as well as no episode of chest pain today    Patient without any chest pain had lightheadedness which has resolved Case discussed with cardiology Dr. Meraz and patient was seen at bedside by Dr. Meraz, shared decision making with patient offered admission for monitoring,  patient understands risks of potential heart complications if he leaves  Patient prefers to go home and see Dr. Blas his cardiologist tomorrow

## 2024-05-19 NOTE — CONSULT NOTE ADULT - ASSESSMENT
Multifactorial near syncope  possible dehydration, upright positions, moderate as  medications    suggest  hydrate  consider reduced bp meds and event monitor  support stocking  office f/u dr yanez    d/w patient daughter and dr chapa

## 2024-05-19 NOTE — ED PROVIDER NOTE - PATIENT PORTAL LINK FT
You can access the FollowMyHealth Patient Portal offered by [facility] by registering at the following website: [email or web address]. By joining [facility]’s FollowMyHealth portal, you will also be able to view your health information using other applications (apps) compatible with our system.

## 2024-05-19 NOTE — ED PROVIDER NOTE - PHYSICAL EXAMINATION
Gen:  alert, awake, no acute distress  Head:  atraumatic, normocephalic  HEENT: PERRLA, EOMI, no nystagmus, no papilledema, normal nose, normal oropharynx, no tonsillar edema, erythema, or exudate  CV:  rrr, nl S1, S2, +systolic murmur, no chest wall ttp  Pulm:  lungs CTA b/l  Abd: s/nt/nd, +BS  MSK:  moving all extremities, no back midline ttp, no stepoffs, no cva TTP  Neuro:  grossly intact, no focal deficits, normal gait  Skin:  clear, dry, intact  Psych: AOx3, normal affect, no apparent risk to self or others

## 2024-05-19 NOTE — ED ADULT TRIAGE NOTE - BEFAST EYES
Physical Therapy Daily Treatment    Visit Count: 3  Plan of Care: 9/17/2019 Through: 12/10/2019  Insurance Information: [employer]  Auth needed OCT 1st- Recheck OCT 1st  OUTCOME tool must be completed for auth  60 PT/OT/ST visits per c/year  Referred by: Meme Penaloza MD; Next provider visit (if known/scheduled): 10/30/19  Medical Diagnosis (from order): Neck pain [M54.2]     Diagnosis Precautions: POTS  Chart reviewed at time of initial evaluation (relevant co-morbidities, allergies, tests and medications listed): asthma, POTS    SUBJECTIVE   Things are going good at home and school, but I know I still have bad posture. It's getting a little less painful and sore during school. It gets worse as I sit for longer during a class. I have some soreness while I drive.   Current Pain (0-10 scale): 5  Functional Change: Less pain and soreness during school, now only gets worse as the class goes on    OBJECTIVE   Increased tightness in B UT, R>L with B palpable tenderness  Scapular dyskinesia in prone and sitting during retraction/depression    Treatment   Therapeutic Exercise  UBE x 5 min forward and backward - level 2 SH 9 with cues for good posture  Supine Chin tucks x 10  Supine Chin tucks with head lift/deep cervical flexor strength x 10  Supine chin tucks with head lift/deep cervical flexor strength x 10 with 5 sec hold   Prone scapular retraction/depreesion x 10 with verbal and tactile cues - done bilaterally, one side at a time   X 3 place and hold, x 5 active   Scapular retraction and depression 1 x 10 with verbal and tactile cues    - standing shoulder ER with OTB 2 x 5 with tactile cues   Seated B UT and LS stretch 30\" ea  Educated pt on proper posture in sitting    Manual Therapy  UT and LS stretch x 30sec ea  STM/TPR to B UT, suboccipitals, and cervical paraspinals    - UT on stretch     Skilled input: verbal instruction/cues, tactile instruction/cues, posture correction, facilitation, education/instruction on  importance of posture correction, reason for muscle soreness, PT prognosis    Home Program:   Supine chin tucks x 10  Supine chin tucks with head/lift deep cervical flexor strength x 10  Scapular retraction and depression x 10  Seated B UT and LS 30\" ea  Standing shoulder ER with OTB 2 x 5    Writer verbally educated the patient and received verbal consent from the patient on hand placement, positioning of patient, and techniques to be performed today including therapist position for techniques, hand placement and palpation for techniques as described above and how they are pertinent to the patient's plan of care.      Suggestions for next session as indicated: progress per plan of care, progress per plan of care, UBE, c-spine flexibility and ROM, cervical and scap stabilization, manual therapy prn     ASSESSMENT   Patient demonstrates B scapular winging, R>L during prone scapular retraction/depression, but is able to correct with tactile cues and with place/hold technique. Scapular dyskinesia during retraction/depression in sitting and prone. Able to maintain scapular positioning during ER with tactile cues, but does fatigue quickly. Continues to require frequent verbal cueing for posture correction.  Pain after treatment (patient reported, 0-10 scale): 6 in back, described as \"good soreness\", 4 in neck   Result of above outlined education: Verbalizes understanding, Demonstrates understanding and Needs reinforcement    THERAPY DAILY BILLING   Insurance: [employer] [facility] 2. N/A    Evaluation Procedures:  No evaluation codes were used on this date of service    Timed Procedures:  Manual Therapy, 12 minutes  Therapeutic Exercise, 27 minutes    Untimed Procedures:  No untimed codes were used on this date of service    Total Treatment Time: 39 minutes    Care approved by and performed under the direction of therapist.  Student's note read and approved.   Deja Lindsay, PT   No

## 2024-05-19 NOTE — ED ADULT NURSE NOTE - NSFALLUNIVINTERV_ED_ALL_ED
Bed/Stretcher in lowest position, wheels locked, appropriate side rails in place/Call bell, personal items and telephone in reach/Instruct patient to call for assistance before getting out of bed/chair/stretcher/Non-slip footwear applied when patient is off stretcher/[place] to call system/Physically safe environment - no spills, clutter or unnecessary equipment/Purposeful proactive rounding/Room/bathroom lighting operational, light cord in reach

## 2024-05-21 ENCOUNTER — APPOINTMENT (OUTPATIENT)
Dept: CARDIOLOGY | Facility: CLINIC | Age: 69
End: 2024-05-21
Payer: MEDICARE

## 2024-05-21 ENCOUNTER — NON-APPOINTMENT (OUTPATIENT)
Age: 69
End: 2024-05-21

## 2024-05-21 VITALS — WEIGHT: 218 LBS | OXYGEN SATURATION: 98 % | BODY MASS INDEX: 32.29 KG/M2 | HEIGHT: 69 IN | HEART RATE: 74 BPM

## 2024-05-21 VITALS — DIASTOLIC BLOOD PRESSURE: 70 MMHG | SYSTOLIC BLOOD PRESSURE: 130 MMHG

## 2024-05-21 VITALS — SYSTOLIC BLOOD PRESSURE: 130 MMHG | HEART RATE: 64 BPM | DIASTOLIC BLOOD PRESSURE: 70 MMHG

## 2024-05-21 VITALS — SYSTOLIC BLOOD PRESSURE: 130 MMHG | DIASTOLIC BLOOD PRESSURE: 70 MMHG

## 2024-05-21 DIAGNOSIS — R55 SYNCOPE AND COLLAPSE: ICD-10-CM

## 2024-05-21 DIAGNOSIS — I35.0 NONRHEUMATIC AORTIC (VALVE) STENOSIS: ICD-10-CM

## 2024-05-21 PROCEDURE — 93000 ELECTROCARDIOGRAM COMPLETE: CPT

## 2024-05-21 PROCEDURE — 99214 OFFICE O/P EST MOD 30 MIN: CPT

## 2024-05-21 PROCEDURE — G2211 COMPLEX E/M VISIT ADD ON: CPT

## 2024-05-22 PROBLEM — R55 SYNCOPE: Status: ACTIVE | Noted: 2024-03-05

## 2024-05-22 PROBLEM — I35.0 AORTIC STENOSIS, MODERATE: Status: ACTIVE | Noted: 2024-03-05

## 2024-05-22 NOTE — ASSESSMENT
[FreeTextEntry1] : Impression: 1.  Patient with no significant coronary disease and noncritical calcific aortic stenosis with a near syncopal episode that sounds vasovagal in nature.  Cannot entirely rule out arrhythmia.  I have strongly advised the patient to wear a ZIO monitor but he absolutely refuses  He understands the risks and benefits  I have advised him to make sure that he hydrates adequately and eats adequately especially on days he is working

## 2024-05-22 NOTE — REASON FOR VISIT
[Other: ____] : [unfilled] [FreeTextEntry1] :  patient presents for reevaluation patient presents for reevaluation on March 19 and this revealed no evidence of significant coronary disease.  Several days ago while at work at approximately 2 PM after not eating lunch and eating breakfast earlier that morning and without hydrating significantly the patient felt weak and sweaty and nearly fainted.  He was brought to the emergency room and was ultimately discharged .  Before and after this episode before and after this episode the patient has felt well

## 2024-06-03 NOTE — CHART NOTE - NSCHARTNOTEFT_GEN_A_CORE
69 y o male presenting to the ED on 5/19/24 complaining of dizziness.  ED Provider recommended follow up appointment with Dr. Blas.  As per HIE, patient attended a follow up appointment on 5/21/24 with Dr. Blas.

## 2024-06-17 ENCOUNTER — APPOINTMENT (OUTPATIENT)
Dept: CARDIOLOGY | Facility: CLINIC | Age: 69
End: 2024-06-17

## 2024-06-19 ENCOUNTER — RX RENEWAL (OUTPATIENT)
Age: 69
End: 2024-06-19

## 2024-06-19 RX ORDER — ATORVASTATIN CALCIUM 40 MG/1
40 TABLET, FILM COATED ORAL
Qty: 90 | Refills: 3 | Status: ACTIVE | COMMUNITY
Start: 2019-02-27 | End: 1900-01-01

## 2024-07-02 ENCOUNTER — RX RENEWAL (OUTPATIENT)
Age: 69
End: 2024-07-02

## 2024-07-22 ENCOUNTER — APPOINTMENT (OUTPATIENT)
Dept: ORTHOPEDIC SURGERY | Facility: CLINIC | Age: 69
End: 2024-07-22
Payer: OTHER MISCELLANEOUS

## 2024-07-22 VITALS — WEIGHT: 218 LBS | HEIGHT: 69 IN | BODY MASS INDEX: 32.29 KG/M2

## 2024-07-22 DIAGNOSIS — Z96.659 PRESENCE OF UNSPECIFIED ARTIFICIAL KNEE JOINT: ICD-10-CM

## 2024-07-22 PROCEDURE — 99214 OFFICE O/P EST MOD 30 MIN: CPT

## 2024-07-22 PROCEDURE — 73562 X-RAY EXAM OF KNEE 3: CPT | Mod: RT

## 2024-07-22 RX ORDER — DICLOFENAC SODIUM 75 MG/1
75 TABLET, DELAYED RELEASE ORAL
Qty: 28 | Refills: 0 | Status: ACTIVE | COMMUNITY
Start: 2024-07-22 | End: 1900-01-01

## 2024-07-25 LAB
CRP SERPL-MCNC: <3 MG/L
ERYTHROCYTE [SEDIMENTATION RATE] IN BLOOD BY WESTERGREN METHOD: 5 MM/HR

## 2024-08-05 ENCOUNTER — RX RENEWAL (OUTPATIENT)
Age: 69
End: 2024-08-05

## 2024-08-13 ENCOUNTER — APPOINTMENT (OUTPATIENT)
Dept: MRI IMAGING | Facility: CLINIC | Age: 69
End: 2024-08-13
Payer: OTHER MISCELLANEOUS

## 2024-08-13 PROCEDURE — 73721 MRI JNT OF LWR EXTRE W/O DYE: CPT | Mod: 26,RT

## 2024-08-19 ENCOUNTER — RX RENEWAL (OUTPATIENT)
Age: 69
End: 2024-08-19

## 2024-08-22 ENCOUNTER — APPOINTMENT (OUTPATIENT)
Dept: ORTHOPEDIC SURGERY | Facility: CLINIC | Age: 69
End: 2024-08-22
Payer: OTHER MISCELLANEOUS

## 2024-08-22 VITALS — BODY MASS INDEX: 32.29 KG/M2 | WEIGHT: 218 LBS | HEIGHT: 69 IN

## 2024-08-22 DIAGNOSIS — Z96.659 PRESENCE OF UNSPECIFIED ARTIFICIAL KNEE JOINT: ICD-10-CM

## 2024-08-22 PROCEDURE — 99214 OFFICE O/P EST MOD 30 MIN: CPT

## 2024-08-22 RX ORDER — DICLOFENAC SODIUM 75 MG/1
75 TABLET, DELAYED RELEASE ORAL
Qty: 28 | Refills: 2 | Status: ACTIVE | COMMUNITY
Start: 2024-08-22 | End: 1900-01-01

## 2024-08-30 NOTE — DISCHARGE NOTE NURSING/CASE MANAGEMENT/SOCIAL WORK - NSDCPEFALRISK_GEN_ALL_CORE
Patient information on fall and injury prevention
covid
Risk Factors inc depressive sx, anxiety sx, substance use; however, acutely risk is mitigated because pt currently denies SI/HI/VI/AVH/PI, has no hx of SA/NSSI, is future oriented with PFs/RFL, has strong social support network, is help seeking, motivated for treatment, compliant with treatment with positive therapeutic relationships, has no access to weapons, Pt is not an acute danger to self/others, no acute indication for psych admission, safe for DC with resources for shelter, appropriate for o/p level of care.  Reviewed to call 911 or go to nearest ED if acute safety concerns arise or symptoms worsen.

## 2024-09-03 ENCOUNTER — NON-APPOINTMENT (OUTPATIENT)
Age: 69
End: 2024-09-03

## 2024-10-04 ENCOUNTER — APPOINTMENT (OUTPATIENT)
Dept: ORTHOPEDIC SURGERY | Facility: CLINIC | Age: 69
End: 2024-10-04
Payer: OTHER MISCELLANEOUS

## 2024-10-04 VITALS — BODY MASS INDEX: 31.7 KG/M2 | HEIGHT: 69 IN | WEIGHT: 214 LBS

## 2024-10-04 DIAGNOSIS — Z96.651 PRESENCE OF RIGHT ARTIFICIAL KNEE JOINT: ICD-10-CM

## 2024-10-04 PROCEDURE — 99213 OFFICE O/P EST LOW 20 MIN: CPT

## 2024-10-14 ENCOUNTER — RX RENEWAL (OUTPATIENT)
Age: 69
End: 2024-10-14

## 2024-10-28 ENCOUNTER — RX RENEWAL (OUTPATIENT)
Age: 69
End: 2024-10-28

## 2024-11-13 ENCOUNTER — RX RENEWAL (OUTPATIENT)
Age: 69
End: 2024-11-13

## 2024-11-25 NOTE — CONSULT NOTE ADULT - SUBJECTIVE AND OBJECTIVE BOX
Called pt to schedule BP follow up appt. Left pt voicemail to call back.   Information Obtained from:  EHR, Physical Chart, Patient at bedside (relevant EHR and Chart information verified with patient)    HPI:  65yo M admitted s/p RIGHT TKR.  Suffered injury at work in May 2019, MRI relevealed meniscus tear and severe OA, constant pain since then, 1-3/10 at rest, up to 10/10 with activity.  Surgery previously cancelled due to lack of cardiac clearance.    Pt was complaining of dyspnea on exertion and fatigue and was evaluated by cardiologist Dr Graham, underwent pharm NST that showed mild ischemia in the apex and distal inferolateral segment.  Pt was referred for [facility] for further ischemic evaluation. Cath showed no significant obstructive lesions.       REVIEW OF SYSTEMS:  CONSTITUTIONAL: No fever, weight loss, or fatigue  EYES: No eye pain, visual disturbances, or discharge  ENMT:  No difficulty hearing, tinnitus, vertigo; No sinus or throat pain  NECK: No pain or stiffness  BREASTS: No pain, masses, or nipple discharge  RESPIRATORY: No cough, wheezing, chills or hemoptysis; No shortness of breath  CARDIOVASCULAR: No chest pain, palpitations, dizziness, or leg swelling  GASTROINTESTINAL: No abdominal or epigastric pain. No nausea, vomiting, or hematemesis; No diarrhea or constipation. No melena or hematochezia.  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 , Being monitored by ophthalmologist , eye injections every 10 weeks and Laser Rx  Bladder polyp: 2010 being monitored by urologist  Osteoarthritis of right knee  High cholesterol  HTN (hypertension)  S/P reconstruction procedure: left arm reconstruction surgery s/p laceration of left forearm with powersaw  2010  History of cholecystectomy: 2016  H/O hernia repair: bilateral inguinal hernia 2018    SOCIAL HISTORY:  Smoking Hx: none  ETOH consumption: none  Illicit Drug Use:  None    Allergies    No Known Allergies    Intolerances    Home Medications:  acetaminophen 500 mg oral capsule: 2 cap(s) orally once a day (at bedtime) (15 Wyatt 2020 06:39)  amLODIPine 10 mg oral tablet: 1 tab(s) orally once a day (15 Wyatt 2020 06:39)  atorvastatin 40 mg oral tablet: 1 tab(s) orally once a day (15 Wyatt 2020 06:39)  hydroCHLOROthiazide 12.5 mg oral capsule: 1 cap(s) orally once a day (15 Wyatt 2020 06:39)  lisinopril 20 mg oral tablet: orally 2 times a day (15 Wyatt 2020 06:39)    FAMILY HISTORY:  Family history of sarcoidosis (Mother): father  Family history of lung disease (Father): mesothelioma    PHYSICAL EXAM:  Vital Signs Last 24 Hrs  T(C): 36.7 (15 Wyatt 2020 06:29), Max: 36.7 (15 Wyatt 2020 06:29)  T(F): 98.1 (15 Wyatt 2020 06:29), Max: 98.1 (15 Wyatt 2020 06:29)  HR: 97 (15 Wyatt 2020 06:29) (97 - 97)  BP: 140/69 (15 Wyatt 2020 06:29) (140/69 - 140/69)  BP(mean): --  RR: 23 (15 Wyatt 2020 06:29) (23 - 23)  SpO2: 98% (15 Wyatt 2020 06:29) (98% - 98%)    GENERAL: NAD, well-groomed, well-developed, awake, alert, oriented x 3, fluent and coherent speech  EYES: EOMI, conjunctiva and sclera clear  ENMT: No tonsillar erythema, exudates, or enlargement; Moist mucous membranes, Good dentition, No lesions  NECK: Supple, No JVD, No Cervical LAD, No thyromegaly, No thyroid nodules  NERVOUS SYSTEM:  Good concentration; Moving all 4 extremities; No gross sensory deficits, No facial droop  CHEST WALL: No masses  CHEST/LUNG: Clear to auscultation bilaterally; No rales, rhonchi, wheezing, or rubs  HEART: Regular rate and rhythm; No murmurs, rubs, or gallops  ABDOMEN: Soft, Nontender, Nondistended, Bowel sounds present, No palpable masses or organomegaly, No bruits  EXTREMITIES:  2+ Peripheral Pulses, No clubbing, cyanosis, or edema  INCISION:  Dressing clean/dry/intact    EKG (was personally reviewed): yes, nsr 96bpm Information Obtained from:  EHR, Physical Chart, Patient at bedside (relevant EHR and Chart information verified with patient)    HPI:  65yo M admitted s/p RIGHT TKR.  Suffered injury at work in May 2019, MRI relevealed meniscus tear and severe OA, constant pain since then, 1-3/10 at rest, up to 10/10 with activity.  Surgery previously cancelled due to lack of cardiac clearance.    Pt was complaining of dyspnea on exertion and fatigue and was evaluated by cardiologist Dr Graham, underwent pharm NST that showed mild ischemia in the apex and distal inferolateral segment.  Pt was referred for [facility] for further ischemic evaluation. Cath showed no significant obstructive lesions.       REVIEW OF SYSTEMS:  CONSTITUTIONAL: No fever, weight loss, or fatigue  EYES: No eye pain, visual disturbances, or discharge  ENMT:  No difficulty hearing, tinnitus, vertigo; No sinus or throat pain  NECK: No pain or stiffness  BREASTS: No pain, masses, or nipple discharge  RESPIRATORY: No cough, wheezing, chills or hemoptysis; No shortness of breath  CARDIOVASCULAR: No chest pain, palpitations, dizziness, or leg swelling  GASTROINTESTINAL: No abdominal or epigastric pain. No nausea, vomiting, or hematemesis; No diarrhea or constipation. No melena or hematochezia.  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 , Being monitored by ophthalmologist , eye injections every 10 weeks and Laser Rx  Bladder polyp: 2010 being monitored by urologist  Osteoarthritis of right knee  High cholesterol  HTN (hypertension)  S/P reconstruction procedure: left arm reconstruction surgery s/p laceration of left forearm with powersaw  2010  History of cholecystectomy: 2016  H/O hernia repair: bilateral inguinal hernia 2018    SOCIAL HISTORY:  Smoking Hx: none  ETOH consumption: none  Illicit Drug Use:  None    Allergies    No Known Allergies    Intolerances    Home Medications:  acetaminophen 500 mg oral capsule: 2 cap(s) orally once a day (at bedtime) (15 Wyatt 2020 06:39)  amLODIPine 10 mg oral tablet: 1 tab(s) orally once a day (15 Wyatt 2020 06:39)  atorvastatin 40 mg oral tablet: 1 tab(s) orally once a day (15 Wyatt 2020 06:39)  hydroCHLOROthiazide 12.5 mg oral capsule: 1 cap(s) orally once a day (15 Wyatt 2020 06:39)  lisinopril 20 mg oral tablet: orally 2 times a day (15 Wyatt 2020 06:39)    FAMILY HISTORY:  Family history of sarcoidosis (Mother): father  Family history of lung disease (Father): mesothelioma    PHYSICAL EXAM:  Vital Signs Last 24 Hrs  T(C): 36.7 (15 Wyatt 2020 06:29), Max: 36.7 (15 Wyatt 2020 06:29)  T(F): 98.1 (15 Wyatt 2020 06:29), Max: 98.1 (15 Wyatt 2020 06:29)  HR: 97 (15 Wyatt 2020 06:29) (97 - 97)  BP: 140/69 (15 Wyatt 2020 06:29) (140/69 - 140/69)  BP(mean): --  RR: 23 (15 Wyatt 2020 06:29) (23 - 23)  SpO2: 98% (15 Wyatt 2020 06:29) (98% - 98%)    GENERAL: NAD, well-groomed, well-developed, easily awakened in PACU, oriented x 3, fluent and coherent speech  EYES: EOMI, conjunctiva and sclera clear  ENMT: No tonsillar erythema, exudates, or enlargement; Moist mucous membranes, Good dentition, No lesions  NECK: Supple, No JVD, No Cervical LAD, No thyromegaly, No thyroid nodules  NERVOUS SYSTEM:   No facial droop  CHEST WALL: No masses  CHEST/LUNG: Clear to auscultation bilaterally; No rales, rhonchi, wheezing, or rubs  HEART: Regular rate and rhythm; No murmurs, rubs, or gallops  ABDOMEN: Soft, Nontender, Nondistended, Bowel sounds present, No palpable masses or organomegaly, No bruits  EXTREMITIES:  2+ Peripheral Pulses, No clubbing, cyanosis, or edema  INCISION:  Dressing clean/dry/intact    EKG (was personally reviewed): yes, nsr 96bpm

## 2024-12-02 ENCOUNTER — RX RENEWAL (OUTPATIENT)
Age: 69
End: 2024-12-02

## 2024-12-16 ENCOUNTER — RX RENEWAL (OUTPATIENT)
Age: 69
End: 2024-12-16

## 2024-12-20 ENCOUNTER — APPOINTMENT (OUTPATIENT)
Dept: ORTHOPEDIC SURGERY | Facility: CLINIC | Age: 69
End: 2024-12-20
Payer: OTHER MISCELLANEOUS

## 2024-12-20 VITALS — HEIGHT: 69 IN | WEIGHT: 214 LBS | BODY MASS INDEX: 31.7 KG/M2

## 2024-12-20 DIAGNOSIS — Z96.659 PRESENCE OF UNSPECIFIED ARTIFICIAL KNEE JOINT: ICD-10-CM

## 2024-12-20 PROCEDURE — 99214 OFFICE O/P EST MOD 30 MIN: CPT

## 2024-12-20 RX ORDER — CELECOXIB 100 MG/1
100 CAPSULE ORAL TWICE DAILY
Qty: 20 | Refills: 2 | Status: ACTIVE | COMMUNITY
Start: 2024-12-20 | End: 1900-01-01

## 2025-01-06 ENCOUNTER — APPOINTMENT (OUTPATIENT)
Dept: CARDIOLOGY | Facility: CLINIC | Age: 70
End: 2025-01-06
Payer: MEDICARE

## 2025-01-06 PROCEDURE — 93306 TTE W/DOPPLER COMPLETE: CPT

## 2025-03-17 NOTE — H&P CARDIOLOGY - RESPIRATORY
Writer contacted pt regarding referral for [facility] to verify services needed and explained to her that there is no opening available at this time. Pt agree to be place on [facility] wait list. Referral closed.   detailed exam

## 2025-04-04 ENCOUNTER — APPOINTMENT (OUTPATIENT)
Dept: ORTHOPEDIC SURGERY | Facility: CLINIC | Age: 70
End: 2025-04-04
Payer: OTHER MISCELLANEOUS

## 2025-04-04 VITALS — WEIGHT: 241 LBS | BODY MASS INDEX: 35.7 KG/M2 | HEIGHT: 69 IN

## 2025-04-04 DIAGNOSIS — Z96.651 PRESENCE OF RIGHT ARTIFICIAL KNEE JOINT: ICD-10-CM

## 2025-04-04 PROCEDURE — 20610 DRAIN/INJ JOINT/BURSA W/O US: CPT | Mod: RT

## 2025-04-04 PROCEDURE — 99214 OFFICE O/P EST MOD 30 MIN: CPT | Mod: 25

## 2025-04-08 ENCOUNTER — NON-APPOINTMENT (OUTPATIENT)
Age: 70
End: 2025-04-08

## 2025-04-08 LAB
B PERT IGG+IGM PNL SER: ABNORMAL
COLOR FLD: YELLOW
FLUID INTAKE SUBSTANCE CLASS: NORMAL
LYMPHOCYTES # FLD MANUAL: 10 %
MONOS+MACROS NFR FLD MANUAL: 5 %
NEUTS SEG # FLD MANUAL: 85 %
RBC # FLD MANUAL: 4000 CELLS/UL
SYCRY CLARITY: ABNORMAL
SYCRY COLOR: YELLOW
SYCRY ID: NORMAL
SYCRY TUBE: NORMAL
TOTAL CELLS COUNTED FLD: 1974 CELLS/UL
TUBE TYPE: NORMAL
WBC COUNT: 1969 CELLS/UL

## 2025-04-09 ENCOUNTER — NON-APPOINTMENT (OUTPATIENT)
Age: 70
End: 2025-04-09

## 2025-04-10 ENCOUNTER — NON-APPOINTMENT (OUTPATIENT)
Age: 70
End: 2025-04-10

## 2025-04-10 LAB — BACTERIA FLD CULT: NORMAL

## 2025-04-15 ENCOUNTER — NON-APPOINTMENT (OUTPATIENT)
Age: 70
End: 2025-04-15

## 2025-04-21 ENCOUNTER — NON-APPOINTMENT (OUTPATIENT)
Age: 70
End: 2025-04-21

## 2025-05-05 ENCOUNTER — RX RENEWAL (OUTPATIENT)
Age: 70
End: 2025-05-05

## 2025-05-06 NOTE — ED ADULT TRIAGE NOTE - AS O2 DELIVERY
room air
[FreeTextEntry1] :  Bone Mineral Density: 08/06/2024 Indication: Comparison to 2023, assess response to medication Spine: not performed Total hip: -1.5, osteopenia, no significant change Femoral neck: -2.4, osteopenia, no significant change Proximal radius: -1.1, osteopenia, no significant change  Bone Mineral Density: 08/15/2023  Indication: vs prior ([id number]?) Spine (L1, L2, L3, L4): -0.4, normal Total hip: -1.6, osteopenia Femoral neck: -2.4, osteopenia Proximal radius: -0.8, normal
[FreeTextEntry1] :  Bone Mineral Density: 08/06/2024 Indication: Comparison to 2023, assess response to medication Spine: not performed Total hip: -1.5, osteopenia, no significant change Femoral neck: -2.4, osteopenia, no significant change Proximal radius: -1.1, osteopenia, no significant change  Bone Mineral Density: 08/15/2023  Indication: vs prior ([id number]?) Spine (L1, L2, L3, L4): -0.4, normal Total hip: -1.6, osteopenia Femoral neck: -2.4, osteopenia Proximal radius: -0.8, normal

## 2025-05-13 ENCOUNTER — RX RENEWAL (OUTPATIENT)
Age: 70
End: 2025-05-13

## 2025-06-13 ENCOUNTER — APPOINTMENT (OUTPATIENT)
Dept: ORTHOPEDIC SURGERY | Facility: CLINIC | Age: 70
End: 2025-06-13

## 2025-06-13 VITALS — BODY MASS INDEX: 35.7 KG/M2 | HEIGHT: 69 IN | WEIGHT: 241 LBS

## 2025-06-13 PROCEDURE — 99455 WORK RELATED DISABILITY EXAM: CPT

## 2025-06-13 PROCEDURE — 73562 X-RAY EXAM OF KNEE 3: CPT | Mod: RT

## 2025-07-04 NOTE — ED PROVIDER NOTE - PROGRESS NOTE DETAILS
Pt wife Sherin called [phone] wanting to make sure we had her information and is asking we keep her in touch regarding Pt medical condition. Sherin lives in [place] and is unable to get to [place] due to finances.    spoke with ortho PA. aware of patient. will order pre-admit labs, EKG, and admit patient. will remain NPO. patient aware of plan Dr. Guan Note: lactate returned, borderline, pt NOT septic, no indication for septic treatment.  Will start maintenance fluids with anticipated OR later today.

## 2025-07-18 ENCOUNTER — APPOINTMENT (OUTPATIENT)
Dept: ORTHOPEDIC SURGERY | Facility: CLINIC | Age: 70
End: 2025-07-18
Payer: OTHER MISCELLANEOUS

## 2025-07-18 VITALS — BODY MASS INDEX: 35.25 KG/M2 | HEIGHT: 69 IN | WEIGHT: 238 LBS

## 2025-07-18 PROCEDURE — 99455 WORK RELATED DISABILITY EXAM: CPT

## 2025-08-22 ENCOUNTER — APPOINTMENT (OUTPATIENT)
Dept: ORTHOPEDIC SURGERY | Facility: CLINIC | Age: 70
End: 2025-08-22

## 2025-09-02 ENCOUNTER — RX RENEWAL (OUTPATIENT)
Age: 70
End: 2025-09-02